# Patient Record
Sex: MALE | Race: WHITE | NOT HISPANIC OR LATINO
[De-identification: names, ages, dates, MRNs, and addresses within clinical notes are randomized per-mention and may not be internally consistent; named-entity substitution may affect disease eponyms.]

---

## 2021-06-03 ENCOUNTER — TRANSCRIPTION ENCOUNTER (OUTPATIENT)
Age: 61
End: 2021-06-03

## 2021-06-03 ENCOUNTER — INPATIENT (INPATIENT)
Facility: HOSPITAL | Age: 61
LOS: 20 days | Discharge: HOME | End: 2021-06-24
Attending: OTOLARYNGOLOGY | Admitting: OTOLARYNGOLOGY
Payer: MEDICARE

## 2021-06-03 VITALS
OXYGEN SATURATION: 100 % | SYSTOLIC BLOOD PRESSURE: 145 MMHG | RESPIRATION RATE: 18 BRPM | TEMPERATURE: 98 F | DIASTOLIC BLOOD PRESSURE: 92 MMHG | HEART RATE: 112 BPM

## 2021-06-03 DIAGNOSIS — C80.1 MALIGNANT (PRIMARY) NEOPLASM, UNSPECIFIED: ICD-10-CM

## 2021-06-03 LAB
ALBUMIN SERPL ELPH-MCNC: 4.1 G/DL — SIGNIFICANT CHANGE UP (ref 3.3–5)
ALP SERPL-CCNC: 66 U/L — SIGNIFICANT CHANGE UP (ref 40–120)
ALT FLD-CCNC: 35 U/L — SIGNIFICANT CHANGE UP (ref 4–41)
APTT BLD: 21 SEC — LOW (ref 27–36.3)
AST SERPL-CCNC: 41 U/L — HIGH (ref 4–40)
BILIRUB DIRECT SERPL-MCNC: <0.2 MG/DL — SIGNIFICANT CHANGE UP (ref 0–0.2)
BILIRUB INDIRECT FLD-MCNC: >2.3 MG/DL — HIGH (ref 0–1)
BILIRUB SERPL-MCNC: 2.5 MG/DL — HIGH (ref 0.2–1.2)
BUN SERPL-MCNC: 14 MG/DL — SIGNIFICANT CHANGE UP (ref 7–23)
CALCIUM SERPL-MCNC: 9.5 MG/DL — SIGNIFICANT CHANGE UP (ref 8.4–10.5)
CO2 SERPL-SCNC: 15 MMOL/L — LOW (ref 22–31)
CREAT SERPL-MCNC: 0.72 MG/DL — SIGNIFICANT CHANGE UP (ref 0.5–1.3)
GLUCOSE SERPL-MCNC: 114 MG/DL — HIGH (ref 70–99)
INR BLD: 0.98 RATIO — SIGNIFICANT CHANGE UP (ref 0.88–1.16)
MAGNESIUM SERPL-MCNC: 1.8 MG/DL — SIGNIFICANT CHANGE UP (ref 1.6–2.6)
PHOSPHATE SERPL-MCNC: 2.7 MG/DL — SIGNIFICANT CHANGE UP (ref 2.5–4.5)
PROT SERPL-MCNC: 6.6 G/DL — SIGNIFICANT CHANGE UP (ref 6–8.3)
PROTHROM AB SERPL-ACNC: 11.2 SEC — SIGNIFICANT CHANGE UP (ref 10.6–13.6)

## 2021-06-03 PROCEDURE — 99233 SBSQ HOSP IP/OBS HIGH 50: CPT | Mod: GC

## 2021-06-03 PROCEDURE — 99223 1ST HOSP IP/OBS HIGH 75: CPT | Mod: GC,25

## 2021-06-03 PROCEDURE — 31575 DIAGNOSTIC LARYNGOSCOPY: CPT | Mod: GC

## 2021-06-03 RX ORDER — SODIUM CHLORIDE 9 MG/ML
1000 INJECTION, SOLUTION INTRAVENOUS
Refills: 0 | Status: DISCONTINUED | OUTPATIENT
Start: 2021-06-03 | End: 2021-06-04

## 2021-06-03 RX ADMIN — SODIUM CHLORIDE 110 MILLILITER(S): 9 INJECTION, SOLUTION INTRAVENOUS at 23:17

## 2021-06-03 NOTE — CONSULT NOTE ADULT - SUBJECTIVE AND OBJECTIVE BOX
SICU Consultation Note  =====================================================  HPI: 60 y.o. male with PMHx HTN, hypothyroidism, PSH of a perforated cecum s/p hemicolectomy and end ileon  (?stomach cancer, s/p colostomy tube 2018) who presents as a transfer from OSH for workup of a laryngeal mass. Per pt has been having SOB on exertion and laying flat, dysphonia for the past 4 weeks and it is progressing. Pt states he has a heavy history of smoking (1 ppd x 46 years) and alcohol abuse (10 16 oz beers x 19 years). Pt denies fevers/weight loss/chills/SOB. States he does not think he has trouble eating/denies dysphagia.    CT scan from OSH reviewed. Showing R laryngeal mass, likely subglottic extending to cricoid.         Surgery Information  OR time:      EBL:       UOP:              IV Fluids:       Blood Products:             PAST MEDICAL & SURGICAL HISTORY:    Home Meds:   Allergies:   Soc:   Advanced Directives: Presumed Full Code     ROS:    General: Non-Contributory  Skin/Breast: Non-Contributory  Ophthalmologic: Non-Contributory  ENMT: Non-Contributory  Respiratory and Thorax: Non-Contributory  Cardiovascular: Non-Contributory  Gastrointestinal: Non-Contributory  Genitourinary: Non-Contributory  Musculoskeletal: Non-Contributory  Neurological: Non-Contributory  Psychiatric: Non-Contributory  Hematology/Lymphatics: Non-Contributory  Endocrine: Non-Contributory  Allergic/Immunologic: Non-Contributory    CURRENT MEDICATIONS:   --------------------------------------------------------------------------------------  Neurologic Medications    Respiratory Medications    Cardiovascular Medications    Gastrointestinal Medications  lactated ringers. 1000 milliLiter(s) IV Continuous <Continuous>    Genitourinary Medications    Hematologic/Oncologic Medications    Antimicrobial/Immunologic Medications    Endocrine/Metabolic Medications    Topical/Other Medications    --------------------------------------------------------------------------------------    VITAL SIGNS, INS/OUTS (last 24 hours):  --------------------------------------------------------------------------------------  ((Insert SICU Vitals / Is+Os here)) ***  --------------------------------------------------------------------------------------    EXAM:  General/Neuro  RASS:   GCS:   Exam: Normal, NAD, alert, oriented x 3, no focal deficits. PERRLA  ***    Respiratory  Exam: Lungs clear to auscultation, Normal expansion/effort.  ***  [] Tracheostomy   [] Intubated  Mechanical Ventilation:     Cardiovascular  Exam: S1, S2.  Regular rate and rhythm.  Peripheral edema  ***  Cardiac Rhythm: Normal Sinus Rhythm  ECHO:     GI  Exam: Abdomen soft, Non-tender, Non-distended.  Gastrostomy / Jejunostomy tube in place.  Nasogastric tube in place.  Colostomy / Ileostomy.  ***  Wound:   ***  Current Diet:  NPO***      Tubes/Lines/Drains  ***  [x] Peripheral IV  [] Central Venous Line     	[] R	[] L	[] IJ	[] Fem	[] SC        Type:	    Date Placed:   [] Arterial Line		[] R	[] L	[] Fem	[] Rad	[] Ax	Date Placed:   [] PICC:         	[] Midline		[] Mediport           [] Urinary Catheter		Date Placed:     Extremities  Exam: Extremities warm, pink, well-perfused.        Derm:  Exam: Good skin turgor, no skin breakdown.      :   Exam: Valdivia catheter in place.     LABS  --------------------------------------------------------------------------------------  ((Insert SICU Labs here))***  --------------------------------------------------------------------------------------    OTHER LABS    IMAGING RESULTS      ASSESSMENT:  60y Male ***    PLAN:   Neurologic:   Respiratory:   Cardiovascular:   Gastrointestinal/Nutrition:   Renal/Genitourinary:   Hematologic:   Infectious Disease:   Lines/Tubes:  Endocrine:   Disposition:     --------------------------------------------------------------------------------------    Critical Care Diagnoses:   SICU Consultation Note  =====================================================  HPI: 60 y.o. male with PMHx HTN, hypothyroidism, PSH perforated cecum s/p hemicolectomy and end ileostomy in 2018 who was transferred from OSH for workup of a laryngeal mass. Pt initially noticed  has been having SOB on exertion and laying flat, dysphonia for the past 4 weeks and it is progressing. Pt states he has a heavy history of smoking (1 ppd x 46 years) and alcohol abuse (10 16 oz beers x 19 years). Pt denies fevers/weight loss/chills/SOB. States he does not think he has trouble eating/denies dysphagia.    CT scan from OSH reviewed. Showing R laryngeal mass, likely subglottic extending to cricoid.         Surgery Information  OR time:      EBL:       UOP:              IV Fluids:       Blood Products:             PAST MEDICAL & SURGICAL HISTORY:    Home Meds:   Allergies:   Soc:   Advanced Directives: Presumed Full Code     ROS:    General: Non-Contributory  Skin/Breast: Non-Contributory  Ophthalmologic: Non-Contributory  ENMT: Non-Contributory  Respiratory and Thorax: Non-Contributory  Cardiovascular: Non-Contributory  Gastrointestinal: Non-Contributory  Genitourinary: Non-Contributory  Musculoskeletal: Non-Contributory  Neurological: Non-Contributory  Psychiatric: Non-Contributory  Hematology/Lymphatics: Non-Contributory  Endocrine: Non-Contributory  Allergic/Immunologic: Non-Contributory    CURRENT MEDICATIONS:   --------------------------------------------------------------------------------------  Neurologic Medications    Respiratory Medications    Cardiovascular Medications    Gastrointestinal Medications  lactated ringers. 1000 milliLiter(s) IV Continuous <Continuous>    Genitourinary Medications    Hematologic/Oncologic Medications    Antimicrobial/Immunologic Medications    Endocrine/Metabolic Medications    Topical/Other Medications    --------------------------------------------------------------------------------------    VITAL SIGNS, INS/OUTS (last 24 hours):  --------------------------------------------------------------------------------------  ((Insert SICU Vitals / Is+Os here)) ***  --------------------------------------------------------------------------------------    EXAM:  General/Neuro  RASS:   GCS:   Exam: Normal, NAD, alert, oriented x 3, no focal deficits. PERRLA  ***    Respiratory  Exam: Lungs clear to auscultation, Normal expansion/effort.  ***  [] Tracheostomy   [] Intubated  Mechanical Ventilation:     Cardiovascular  Exam: S1, S2.  Regular rate and rhythm.  Peripheral edema  ***  Cardiac Rhythm: Normal Sinus Rhythm  ECHO:     GI  Exam: Abdomen soft, Non-tender, Non-distended.  Gastrostomy / Jejunostomy tube in place.  Nasogastric tube in place.  Colostomy / Ileostomy.  ***  Wound:   ***  Current Diet:  NPO***      Tubes/Lines/Drains  ***  [x] Peripheral IV  [] Central Venous Line     	[] R	[] L	[] IJ	[] Fem	[] SC        Type:	    Date Placed:   [] Arterial Line		[] R	[] L	[] Fem	[] Rad	[] Ax	Date Placed:   [] PICC:         	[] Midline		[] Mediport           [] Urinary Catheter		Date Placed:     Extremities  Exam: Extremities warm, pink, well-perfused.        Derm:  Exam: Good skin turgor, no skin breakdown.      :   Exam: Valdivia catheter in place.     LABS  --------------------------------------------------------------------------------------  ((Insert SICU Labs here))***  --------------------------------------------------------------------------------------    OTHER LABS    IMAGING RESULTS      ASSESSMENT:  60y Male ***    PLAN:   Neurologic:   Respiratory:   Cardiovascular:   Gastrointestinal/Nutrition:   Renal/Genitourinary:   Hematologic:   Infectious Disease:   Lines/Tubes:  Endocrine:   Disposition:     --------------------------------------------------------------------------------------    Critical Care Diagnoses:   SICU Consultation Note  =====================================================  HPI: 60 y.o. 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 Pt initially noticed hoarse voice this past January. Pt presented to the OSH with SOB, difficulty breathing. From pt's records from OSH, CT neck showed bulky transglottic right sided mass, CT chest showed no intrathoracic mass. Pt was given solumedrol and duonebs and pt was transferred for further management. SICU consulted for [place].    CURRENT MEDICATIONS:   --------------------------------------------------------------------------------------  Neurologic Medications    Respiratory Medications    Cardiovascular Medications    Gastrointestinal Medications  lactated ringers. 1000 milliLiter(s) IV Continuous <Continuous>    Genitourinary Medications    Hematologic/Oncologic Medications    Antimicrobial/Immunologic Medications    Endocrine/Metabolic Medications    Topical/Other Medications    --------------------------------------------------------------------------------------  ICU Vital Signs Last 24 Hrs  T(C): 36.7 (04 Jun 2021 00:17), Max: 36.8 (03 Jun 2021 18:18)  T(F): 98 (04 Jun 2021 00:17), Max: 98.2 (03 Jun 2021 18:18)  HR: 91 (04 Jun 2021 00:17) (91 - 112)  BP: 140/89 (04 Jun 2021 00:17) (124/87 - 145/92)  BP(mean): --  ABP: --  ABP(mean): --  RR: 17 (04 Jun 2021 00:17) (17 - 18)  SpO2: 98% (04 Jun 2021 00:17) (98% - 100%)    --------------------------------------------------------------------------------------  I&O's Detail    03 Jun 2021 07:01  -  04 Jun 2021 00:35  --------------------------------------------------------  IN:  Total IN: 0 mL    OUT:    Voided (mL): 400 mL  Total OUT: 400 mL    Total NET: -400 mL    --------------------------------------------------------------------------------------    EXAM:  General/Neuro    Exam: NAD, laying comfortably in bed, no tremor, alert, oriented x 3, no focal deficits,     Respiratory  Exam: Lungs clear to auscultation, Normal expansion/effort, patent airway, noticeable horse voice, Sats 97% on RA    Cardiovascular  Exam: S1, S2.  Regular rate and rhythm, not tachycardic. Cardiac Rhythm: Normal Sinus Rhythm  Vitals: 151/93, HR 97    GI  Exam: Abdomen soft, Non-tender, Non-distended. Current Diet:  NPO    Tubes/Lines/Drains    [x] Peripheral IV  [] Central Venous Line     	[] R	[] L	[] IJ	[] Fem	[] SC        Type:	    Date Placed:   [] Arterial Line		[] R	[] L	[] Fem	[] Rad	[] Ax	Date Placed:   [] PICC:         	[] Midline		[] Mediport           [] Urinary Catheter		Date Placed:     Extremities  Exam: Extremities warm, pink, well-perfused.      Derm:  Exam: Good skin turgor, no skin breakdown.      :   Exam: Valdivia catheter in place.     LABS  --------------------------------------------------------------------------------------    BMP (06-03 @ 23:17)             116<LL>  |  85<L>   |  14    		Ca++ --      Ca 9.5                ---------------------------------( 114<H>		Mg 1.8                5.3     |  15<L>   |  0.72  			Ph 2.7       LFTs (06-03 @ 23:17)      TPro 6.6 / Alb 4.1 / TBili 2.5<H> / DBili <0.2 / AST 41<H> / ALT 35 / AlkPhos 66    Coags (06-03 @ 23:17)  aPTT 21.0<L> / INR 0.98 / PT 11.2      --------------------------------------------------------------------------------------    ASSESSMENT:  60 y.o. male with right sided transglottic laryngeal mass    PLAN:   Neurologic: A&Ox4, in no acute distress laying comfortably, no tremors  Respiratory: Patent airway, Sats > 97% on RA, not using accessory muscles   Cardiovascular: hemodynamically stable, not tachycardic, MAP > 65  Gastrointestinal/Nutrition: NPO, plan as add on per ENT  Renal/Genitourinary: strict Is and Os  Hematologic: trend CBCs  Infectious Disease: hx of hypothyroidism  Lines/Tubes: PIV  Endocrine:   Disposition:     --------------------------------------------------------------------------------------    Critical Care Diagnoses:   SICU Consultation Note  =====================================================  HPI: 60 y.o. 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 Pt initially noticed hoarse voice this past January. Pt presented to the OSH with SOB, difficulty breathing. From pt's records from OSH, CT neck showed bulky transglottic right sided mass, CT chest showed no intrathoracic mass. Pt was given solumedrol and duonebs and pt was transferred for further management. SICU consulted for [employer].    CURRENT MEDICATIONS:   --------------------------------------------------------------------------------------  Neurologic Medications    Respiratory Medications    Cardiovascular Medications    Gastrointestinal Medications  lactated ringers. 1000 milliLiter(s) IV Continuous <Continuous>    Genitourinary Medications    Hematologic/Oncologic Medications    Antimicrobial/Immunologic Medications    Endocrine/Metabolic Medications    Topical/Other Medications    --------------------------------------------------------------------------------------  ICU Vital Signs Last 24 Hrs  T(C): 36.7 (04 Jun 2021 00:17), Max: 36.8 (03 Jun 2021 18:18)  T(F): 98 (04 Jun 2021 00:17), Max: 98.2 (03 Jun 2021 18:18)  HR: 91 (04 Jun 2021 00:17) (91 - 112)  BP: 140/89 (04 Jun 2021 00:17) (124/87 - 145/92)  BP(mean): --  ABP: --  ABP(mean): --  RR: 17 (04 Jun 2021 00:17) (17 - 18)  SpO2: 98% (04 Jun 2021 00:17) (98% - 100%)    --------------------------------------------------------------------------------------  I&O's Detail    03 Jun 2021 07:01  -  04 Jun 2021 00:35  --------------------------------------------------------  IN:  Total IN: 0 mL    OUT:    Voided (mL): 400 mL  Total OUT: 400 mL    Total NET: -400 mL    --------------------------------------------------------------------------------------    EXAM:  General/Neuro    Exam: NAD, laying comfortably in bed, no tremor, alert, oriented x 3, no focal deficits,     Respiratory  Exam: Lungs clear to auscultation, Normal expansion/effort, patent airway, noticeable horse voice, Sats 97% on RA    Cardiovascular  Exam: S1, S2.  Regular rate and rhythm, not tachycardic. Cardiac Rhythm: Normal Sinus Rhythm  Vitals: 151/93, HR 97    GI  Exam: Abdomen soft, Non-tender, Non-distended. Current Diet:  NPO    Tubes/Lines/Drains    [x] Peripheral IV  [] Central Venous Line     	[] R	[] L	[] IJ	[] Fem	[] SC        Type:	    Date Placed:   [] Arterial Line		[] R	[] L	[] Fem	[] Rad	[] Ax	Date Placed:   [] PICC:         	[] Midline		[] Mediport           [] Urinary Catheter		Date Placed:     Extremities  Exam: Extremities warm, pink, well-perfused.      Derm:  Exam: Good skin turgor, no skin breakdown.      :   Exam: Valdivia catheter in place.     LABS  --------------------------------------------------------------------------------------    BMP (06-03 @ 23:17)             116<LL>  |  85<L>   |  14    		Ca++ --      Ca 9.5                ---------------------------------( 114<H>		Mg 1.8                5.3     |  15<L>   |  0.72  			Ph 2.7       LFTs (06-03 @ 23:17)      TPro 6.6 / Alb 4.1 / TBili 2.5<H> / DBili <0.2 / AST 41<H> / ALT 35 / AlkPhos 66    Coags (06-03 @ 23:17)  aPTT 21.0<L> / INR 0.98 / PT 11.2      --------------------------------------------------------------------------------------    ASSESSMENT:  60 y.o. male with right sided transglottic laryngeal mass    PLAN:   Neurologic: A&Ox4, in no acute distress laying comfortably, no tremors  Respiratory: Patent airway, Sats > 97% on RA, not using accessory muscles   Cardiovascular: hemodynamically stable, not tachycardic, MAP > 65  Gastrointestinal/Nutrition: NPO, plan as add on per ENT  Renal/Genitourinary: strict Is and Os, AM labs pending  Hematologic: trend CBCs  Infectious Disease: JAMMIE  Lines/Tubes: PIV  Endocrine: hx of hypothyroidism  Disposition: Floor    Appreciate the consult but, pt does not meet SICU requirements at this time. Pt recall SICU if needed  --------------------------------------------------------------------------------------    Critical Care Diagnoses: Laryngeal cancer

## 2021-06-03 NOTE — PATIENT PROFILE ADULT - NSPROMEDSADMININFO_GEN_A_NUR
Spoke with patient. Informed her that Dr. Milligan reviewed her glucose values and states she can change to BID testing. Discussed doing fasting each morning and alternating 2 hr after meal values. She v/u and agrees.   
no concerns

## 2021-06-03 NOTE — CONSULT NOTE ADULT - SUBJECTIVE AND OBJECTIVE BOX
HPI:  Patient is a 60y Male with unknown PMHx (?stomach cancer, s/p colostomy tube 2018) who presents as a transfer from OSH for workup of a laryngeal mass. Per pt has been having SOB on exertion and laying flat, dysphonia for the past 4 weeks and it is progressing. Pt states he has a heavy history of smoking (1 ppd x 46 years) and alcohol abuse (10 16 oz beers x 19 years). Pt denies fevers/weight loss/chills/SOB. States he does not think he has trouble eating/denies dysphagia.    CT scan from OSH reviewed. Showing R laryngeal mass, likely subglottic extending to cricoid.     Physical Exam  T(C): 36.8 (06-03-21 @ 20:19), Max: 36.8 (06-03-21 @ 18:18)  HR: 95 (06-03-21 @ 20:19) (95 - 112)  BP: 124/87 (06-03-21 @ 20:19) (124/87 - 145/92)  RR: 18 (06-03-21 @ 20:19) (18 - 18)  SpO2: 99% (06-03-21 @ 20:19) (99% - 100%)  General: NAD, A+Ox3 on my exam   Voice quality: raspy and hoarse   Face:  Symmetric without masses or lesions  OU: EOMI   Nose: nasal cavity clear bilaterally, inferior turbinates normal, mucosa normal without crusting or bleeding  OC/OP: tongue normal, floor of mouth wnl, no masses or lesions, OP clear  Neck: soft/flat, no LAD palpated.   CNII-XII intact    Procedure: Flexible laryngoscopy    Pre-procedure diagnosis/Indication for procedure: To evaluate larynx    Anesthesia: None    Description:    A flexible endoscope was used to examine the left and right nasal cavities, posterior oropharynx, hypopharynx, and larynx. The nasal valve areas were examined for abnormalities or collapse. The inferior and middle turbinates were evaluated. The middle and superior meatuses, the sphenoethmoid recesses, and the nasopharynx were examined and inspected for mucopurulence, polyps, and nasal masses. The oropharynx, tongue base/vallecula, epiglottis, aryepiglottic folds, arytenoids, vocal cords, hypopharynx were also inspected for swelling, inflammation, or dysfunction. The patient tolerated the procedure without complications.    Findings:    NP wnl  BOT/vallecula normal  Epiglottis sharp  AE folds nonedematous  Arytenoids mobile  R TVC immobile   Unable to fully visualize mass on exam   No masses or lesions visualized in post cricoid space or pyriform sinuses bilaterally      --------------------------------------------------------------      A/P:  60M unknown PMHx p/w laryngeal mass. Based on CT scan and scope exam, will likely need OR intervention for further workup.  - please keep NPO  - preop labs  - COVID swab  - medicine consult for preop assessment  - [facility] protocol - SICU  - SLP eval   - needs consent for DLB/biopsy tomorrow    ---------------------------------------------------------------

## 2021-06-03 NOTE — PROVIDER CONTACT NOTE (OTHER) - SITUATION
patient have remote tele order and also requires CIWA. [facility] RNs not trained to do tele or CIWA. patient is currently not being monitored untill transferred to ICU.

## 2021-06-03 NOTE — CONSULT NOTE ADULT - ATTENDING COMMENTS
Mr. 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 His euvolemic hyponatremia is possibly related to his underlying malignancy or hypothyroidism. via elevated ADH. Pre-op for OR in AM  SICU admission pending availability of bed/staff    E87.1 Hyponatremia   -NS at 125cc/hr and rechck BMP, monitor for mental status change. Avoid quick correction since duration of hyponatremia unclear  -LFTs normal and glucose WNL  J98.8 Airway obstruction   -trach in AM with ENT, airway watch for now  F10.10 ETOH abuse   -monitor for withdrawal, no evidence of liver disease currently    Moody Stallworth MD  Acute Care Surgery

## 2021-06-04 ENCOUNTER — RESULT REVIEW (OUTPATIENT)
Age: 61
End: 2021-06-04

## 2021-06-04 LAB
ANION GAP SERPL CALC-SCNC: 11 MMOL/L — SIGNIFICANT CHANGE UP (ref 7–14)
ANION GAP SERPL CALC-SCNC: 11 MMOL/L — SIGNIFICANT CHANGE UP (ref 7–14)
ANION GAP SERPL CALC-SCNC: 13 MMOL/L — SIGNIFICANT CHANGE UP (ref 7–14)
ANION GAP SERPL CALC-SCNC: 16 MMOL/L — HIGH (ref 7–14)
APTT BLD: 26.5 SEC — LOW (ref 27–36.3)
BASOPHILS # BLD AUTO: 0 K/UL — SIGNIFICANT CHANGE UP (ref 0–0.2)
BASOPHILS NFR BLD AUTO: 0 % — SIGNIFICANT CHANGE UP (ref 0–2)
BLD GP AB SCN SERPL QL: NEGATIVE — SIGNIFICANT CHANGE UP
BUN SERPL-MCNC: 10 MG/DL — SIGNIFICANT CHANGE UP (ref 7–23)
CALCIUM SERPL-MCNC: 7 MG/DL — LOW (ref 8.4–10.5)
CALCIUM SERPL-MCNC: 8.3 MG/DL — LOW (ref 8.4–10.5)
CALCIUM SERPL-MCNC: 8.7 MG/DL — SIGNIFICANT CHANGE UP (ref 8.4–10.5)
CHLORIDE SERPL-SCNC: 85 MMOL/L — LOW (ref 98–107)
CHLORIDE SERPL-SCNC: 90 MMOL/L — LOW (ref 98–107)
CHLORIDE SERPL-SCNC: 91 MMOL/L — LOW (ref 98–107)
CHLORIDE SERPL-SCNC: 98 MMOL/L — SIGNIFICANT CHANGE UP (ref 98–107)
CO2 SERPL-SCNC: 18 MMOL/L — LOW (ref 22–31)
CO2 SERPL-SCNC: 21 MMOL/L — LOW (ref 22–31)
CO2 SERPL-SCNC: 22 MMOL/L — SIGNIFICANT CHANGE UP (ref 22–31)
CREAT SERPL-MCNC: 0.53 MG/DL — SIGNIFICANT CHANGE UP (ref 0.5–1.3)
CREAT SERPL-MCNC: 0.71 MG/DL — SIGNIFICANT CHANGE UP (ref 0.5–1.3)
CREAT SERPL-MCNC: 0.72 MG/DL — SIGNIFICANT CHANGE UP (ref 0.5–1.3)
EOSINOPHIL # BLD AUTO: 0 K/UL — SIGNIFICANT CHANGE UP (ref 0–0.5)
EOSINOPHIL NFR BLD AUTO: 0 % — SIGNIFICANT CHANGE UP (ref 0–6)
GIANT PLATELETS BLD QL SMEAR: PRESENT — SIGNIFICANT CHANGE UP
GLUCOSE SERPL-MCNC: 101 MG/DL — HIGH (ref 70–99)
GLUCOSE SERPL-MCNC: 129 MG/DL — HIGH (ref 70–99)
GLUCOSE SERPL-MCNC: 98 MG/DL — SIGNIFICANT CHANGE UP (ref 70–99)
HCT VFR BLD CALC: 32.6 % — LOW (ref 39–50)
HCT VFR BLD CALC: 42.3 % — SIGNIFICANT CHANGE UP (ref 39–50)
HGB BLD-MCNC: 11.9 G/DL — LOW (ref 13–17)
HGB BLD-MCNC: 14.2 G/DL — SIGNIFICANT CHANGE UP (ref 13–17)
IANC: 8.83 K/UL — HIGH (ref 1.5–8.5)
INR BLD: 0.98 RATIO — SIGNIFICANT CHANGE UP (ref 0.88–1.16)
LYMPHOCYTES # BLD AUTO: 0.34 K/UL — LOW (ref 1–3.3)
LYMPHOCYTES # BLD AUTO: 3.5 % — LOW (ref 13–44)
MAGNESIUM SERPL-MCNC: 1.3 MG/DL — LOW (ref 1.6–2.6)
MAGNESIUM SERPL-MCNC: 1.5 MG/DL — LOW (ref 1.6–2.6)
MAGNESIUM SERPL-MCNC: 1.8 MG/DL — SIGNIFICANT CHANGE UP (ref 1.6–2.6)
MAGNESIUM SERPL-MCNC: 1.8 MG/DL — SIGNIFICANT CHANGE UP (ref 1.6–2.6)
MANUAL SMEAR VERIFICATION: SIGNIFICANT CHANGE UP
MCHC RBC-ENTMCNC: 31.1 PG — SIGNIFICANT CHANGE UP (ref 27–34)
MCHC RBC-ENTMCNC: 33.6 GM/DL — SIGNIFICANT CHANGE UP (ref 32–36)
MCHC RBC-ENTMCNC: 34.5 PG — HIGH (ref 27–34)
MCHC RBC-ENTMCNC: 36.5 GM/DL — HIGH (ref 32–36)
MCV RBC AUTO: 92.6 FL — SIGNIFICANT CHANGE UP (ref 80–100)
MCV RBC AUTO: 94.5 FL — SIGNIFICANT CHANGE UP (ref 80–100)
MONOCYTES # BLD AUTO: 0.27 K/UL — SIGNIFICANT CHANGE UP (ref 0–0.9)
MONOCYTES NFR BLD AUTO: 2.7 % — SIGNIFICANT CHANGE UP (ref 2–14)
NEUTROPHILS # BLD AUTO: 9.22 K/UL — HIGH (ref 1.8–7.4)
NEUTROPHILS NFR BLD AUTO: 92.9 % — HIGH (ref 43–77)
NEUTS BAND # BLD: 0.9 % — SIGNIFICANT CHANGE UP (ref 0–6)
NRBC # BLD: 0 /100 WBCS — SIGNIFICANT CHANGE UP
NRBC # FLD: 0 K/UL — SIGNIFICANT CHANGE UP
PHOSPHATE SERPL-MCNC: 13.4 MG/DL — HIGH (ref 2.5–4.5)
PHOSPHATE SERPL-MCNC: 2.1 MG/DL — LOW (ref 2.5–4.5)
PHOSPHATE SERPL-MCNC: 3.8 MG/DL — SIGNIFICANT CHANGE UP (ref 2.5–4.5)
PLAT MORPH BLD: NORMAL — SIGNIFICANT CHANGE UP
PLATELET # BLD AUTO: 101 K/UL — LOW (ref 150–400)
PLATELET # BLD AUTO: 134 K/UL — LOW (ref 150–400)
PLATELET COUNT - ESTIMATE: ABNORMAL
POIKILOCYTOSIS BLD QL AUTO: SLIGHT — SIGNIFICANT CHANGE UP
POTASSIUM SERPL-MCNC: 3.4 MMOL/L — LOW (ref 3.5–5.3)
POTASSIUM SERPL-MCNC: 4.6 MMOL/L — SIGNIFICANT CHANGE UP (ref 3.5–5.3)
POTASSIUM SERPL-MCNC: 5.3 MMOL/L — SIGNIFICANT CHANGE UP (ref 3.5–5.3)
POTASSIUM SERPL-MCNC: 9.6 MMOL/L — CRITICAL HIGH (ref 3.5–5.3)
POTASSIUM SERPL-SCNC: 3.4 MMOL/L — LOW (ref 3.5–5.3)
POTASSIUM SERPL-SCNC: 4.6 MMOL/L — SIGNIFICANT CHANGE UP (ref 3.5–5.3)
POTASSIUM SERPL-SCNC: 5.3 MMOL/L — SIGNIFICANT CHANGE UP (ref 3.5–5.3)
POTASSIUM SERPL-SCNC: 9.6 MMOL/L — CRITICAL HIGH (ref 3.5–5.3)
PROTHROM AB SERPL-ACNC: 11.3 SEC — SIGNIFICANT CHANGE UP (ref 10.6–13.6)
RBC # BLD: 3.45 M/UL — LOW (ref 4.2–5.8)
RBC # BLD: 4.57 M/UL — SIGNIFICANT CHANGE UP (ref 4.2–5.8)
RBC # FLD: 11.7 % — SIGNIFICANT CHANGE UP (ref 10.3–14.5)
RBC # FLD: 11.9 % — SIGNIFICANT CHANGE UP (ref 10.3–14.5)
RBC BLD AUTO: ABNORMAL
RH IG SCN BLD-IMP: POSITIVE — SIGNIFICANT CHANGE UP
SARS-COV-2 RNA SPEC QL NAA+PROBE: SIGNIFICANT CHANGE UP
SODIUM SERPL-SCNC: 116 MMOL/L — CRITICAL LOW (ref 135–145)
SODIUM SERPL-SCNC: 124 MMOL/L — LOW (ref 135–145)
SODIUM SERPL-SCNC: 124 MMOL/L — LOW (ref 135–145)
SODIUM SERPL-SCNC: 127 MMOL/L — LOW (ref 135–145)
WBC # BLD: 5.79 K/UL — SIGNIFICANT CHANGE UP (ref 3.8–10.5)
WBC # BLD: 9.83 K/UL — SIGNIFICANT CHANGE UP (ref 3.8–10.5)
WBC # FLD AUTO: 5.79 K/UL — SIGNIFICANT CHANGE UP (ref 3.8–10.5)
WBC # FLD AUTO: 9.83 K/UL — SIGNIFICANT CHANGE UP (ref 3.8–10.5)

## 2021-06-04 PROCEDURE — 31535 LARYNGOSCOPY W/BIOPSY: CPT | Mod: GC

## 2021-06-04 PROCEDURE — 99291 CRITICAL CARE FIRST HOUR: CPT | Mod: 25

## 2021-06-04 PROCEDURE — 88331 PATH CONSLTJ SURG 1 BLK 1SPC: CPT | Mod: 26

## 2021-06-04 PROCEDURE — 31600 PLANNED TRACHEOSTOMY: CPT | Mod: GC

## 2021-06-04 PROCEDURE — 88305 TISSUE EXAM BY PATHOLOGIST: CPT | Mod: 26

## 2021-06-04 PROCEDURE — 43191 ESOPHAGOSCOPY RIGID TRNSO DX: CPT | Mod: GC

## 2021-06-04 RX ORDER — ACETAMINOPHEN 500 MG
650 TABLET ORAL ONCE
Refills: 0 | Status: DISCONTINUED | OUTPATIENT
Start: 2021-06-04 | End: 2021-06-04

## 2021-06-04 RX ORDER — ACETAMINOPHEN 500 MG
1000 TABLET ORAL EVERY 6 HOURS
Refills: 0 | Status: COMPLETED | OUTPATIENT
Start: 2021-06-04 | End: 2021-06-05

## 2021-06-04 RX ORDER — MAGNESIUM SULFATE 500 MG/ML
2 VIAL (ML) INJECTION
Refills: 0 | Status: COMPLETED | OUTPATIENT
Start: 2021-06-04 | End: 2021-06-04

## 2021-06-04 RX ORDER — ACETAMINOPHEN 500 MG
650 TABLET ORAL EVERY 6 HOURS
Refills: 0 | Status: DISCONTINUED | OUTPATIENT
Start: 2021-06-04 | End: 2021-06-04

## 2021-06-04 RX ORDER — POTASSIUM PHOSPHATE, MONOBASIC POTASSIUM PHOSPHATE, DIBASIC 236; 224 MG/ML; MG/ML
15 INJECTION, SOLUTION INTRAVENOUS ONCE
Refills: 0 | Status: COMPLETED | OUTPATIENT
Start: 2021-06-04 | End: 2021-06-04

## 2021-06-04 RX ORDER — ENOXAPARIN SODIUM 100 MG/ML
40 INJECTION SUBCUTANEOUS DAILY
Refills: 0 | Status: DISCONTINUED | OUTPATIENT
Start: 2021-06-04 | End: 2021-06-11

## 2021-06-04 RX ORDER — THIAMINE MONONITRATE (VIT B1) 100 MG
100 TABLET ORAL ONCE
Refills: 0 | Status: COMPLETED | OUTPATIENT
Start: 2021-06-04 | End: 2021-06-04

## 2021-06-04 RX ORDER — SODIUM CHLORIDE 9 MG/ML
1000 INJECTION INTRAMUSCULAR; INTRAVENOUS; SUBCUTANEOUS
Refills: 0 | Status: DISCONTINUED | OUTPATIENT
Start: 2021-06-04 | End: 2021-06-07

## 2021-06-04 RX ORDER — PANTOPRAZOLE SODIUM 20 MG/1
40 TABLET, DELAYED RELEASE ORAL DAILY
Refills: 0 | Status: DISCONTINUED | OUTPATIENT
Start: 2021-06-04 | End: 2021-06-05

## 2021-06-04 RX ADMIN — PANTOPRAZOLE SODIUM 40 MILLIGRAM(S): 20 TABLET, DELAYED RELEASE ORAL at 15:32

## 2021-06-04 RX ADMIN — Medication 100 MILLIGRAM(S): at 15:00

## 2021-06-04 RX ADMIN — Medication 400 MILLIGRAM(S): at 18:38

## 2021-06-04 RX ADMIN — SODIUM CHLORIDE 75 MILLILITER(S): 9 INJECTION INTRAMUSCULAR; INTRAVENOUS; SUBCUTANEOUS at 02:08

## 2021-06-04 RX ADMIN — Medication 50 GRAM(S): at 08:29

## 2021-06-04 RX ADMIN — ENOXAPARIN SODIUM 40 MILLIGRAM(S): 100 INJECTION SUBCUTANEOUS at 18:38

## 2021-06-04 RX ADMIN — Medication 50 GRAM(S): at 08:30

## 2021-06-04 RX ADMIN — POTASSIUM PHOSPHATE, MONOBASIC POTASSIUM PHOSPHATE, DIBASIC 62.5 MILLIMOLE(S): 236; 224 INJECTION, SOLUTION INTRAVENOUS at 08:30

## 2021-06-04 NOTE — CHART NOTE - NSCHARTNOTEFT_GEN_A_CORE
Spoke w MD Thornton [phone] and patient RN this shift.  RN expressed concern that patient was trached 2 hrs before consult was placed and he is adjusting to not being able to speak at this time.  Spoke with MD - no urgent need for consult at this time. Can be seen Monday.  Requesting help with substance addiction and resources upon discharge.   NO concern for suicidality, psychosis, or acute withdrawal at this time.  Patient is ordered for PRN ativan and has not required at this time. SICU does not do [place] protocol as per staff on the unit.  Educated on s.s of withdrawal to be mindful of, HTN, Tachycardia, tremors, diaphoresis.   Aware to call CL if consult is needed prior to Monday 6/7.

## 2021-06-04 NOTE — PROGRESS NOTE ADULT - ASSESSMENT
A/P:  60 p/w laryngeal mass. Based on CT scan and scope exam, will likely need OR intervention for further workup.  - please keep NPO for DL/biopsy/possible trach today  - F/u CTS on possible PEG during procedure  - preop labs  - COVID swab  - medicine consult for preop assessment  - [facility] protocol - SICU  - SLP brandon  A/P:  60 p/w laryngeal mass. Based on CT scan and scope exam, will likely need OR intervention for further workup.  - please keep NPO for DL/biopsy/possible trach today  - F/u CTS on possible PEG during procedure  - preop labs  - COVID swab  - Needs preop assessment for medical clearance  - [facility] protocol - SICU  - SLP eval after procedure for diet

## 2021-06-04 NOTE — PROVIDER CONTACT NOTE (CRITICAL VALUE NOTIFICATION) - RECOMMENDATIONS
consider confirmation of result - Pt physically not on unit yet - pending transfer from [facility]S who told toxicology that patient is no longer there

## 2021-06-04 NOTE — PROGRESS NOTE ADULT - SUBJECTIVE AND OBJECTIVE BOX
SICU Consultation Note  =====================================================  HPI: 60 y.o. 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 Pt initially noticed hoarse voice this past January. Pt presented to the OSH with SOB, difficulty breathing. From pt's records from OSH, CT neck showed bulky transglottic right sided mass, CT chest showed no intrathoracic mass. Pt was given solumedrol and duonebs and pt was transferred for further management. SICU consulted for [employer].    Overnight events:   - SICU recalled for Na 116  - pt accepted to SICU for hyponatremia     CURRENT MEDICATIONS:   --------------------------------------------------------------------------------------  Neurologic Medications    Respiratory Medications    Cardiovascular Medications    Gastrointestinal Medications  lactated ringers. 1000 milliLiter(s) IV Continuous <Continuous>    Genitourinary Medications    Hematologic/Oncologic Medications    Antimicrobial/Immunologic Medications    Endocrine/Metabolic Medications    Topical/Other Medications    --------------------------------------------------------------------------------------  ICU Vital Signs Last 24 Hrs  T(C): 36.7 (04 Jun 2021 00:17), Max: 36.8 (03 Jun 2021 18:18)  T(F): 98 (04 Jun 2021 00:17), Max: 98.2 (03 Jun 2021 18:18)  HR: 91 (04 Jun 2021 00:17) (91 - 112)  BP: 140/89 (04 Jun 2021 00:17) (124/87 - 145/92)  BP(mean): --  ABP: --  ABP(mean): --  RR: 17 (04 Jun 2021 00:17) (17 - 18)  SpO2: 98% (04 Jun 2021 00:17) (98% - 100%)    --------------------------------------------------------------------------------------  I&O's Detail    03 Jun 2021 07:01  -  04 Jun 2021 00:35  --------------------------------------------------------  IN:  Total IN: 0 mL    OUT:    Voided (mL): 400 mL  Total OUT: 400 mL    Total NET: -400 mL    --------------------------------------------------------------------------------------    EXAM:  General/Neuro    Exam: NAD, laying comfortably in bed, no tremor, alert, oriented x 3, no focal deficits,     Respiratory  Exam: Lungs clear to auscultation, Normal expansion/effort, patent airway, noticeable horse voice, Sats 97% on RA    Cardiovascular  Exam: S1, S2.  Regular rate and rhythm, not tachycardic. Cardiac Rhythm: Normal Sinus Rhythm  Vitals: 151/93, HR 97    GI  Exam: Abdomen soft, Non-tender, Non-distended. Current Diet:  NPO    Tubes/Lines/Drains    [x] Peripheral IV  [] Central Venous Line     	[] R	[] L	[] IJ	[] Fem	[] SC        Type:	    Date Placed:   [] Arterial Line		[] R	[] L	[] Fem	[] Rad	[] Ax	Date Placed:   [] PICC:         	[] Midline		[] Mediport           [] Urinary Catheter		Date Placed:     Extremities  Exam: Extremities warm, pink, well-perfused.      Derm:  Exam: Good skin turgor, no skin breakdown.      :   Exam: Valdivia catheter in place.     LABS  --------------------------------------------------------------------------------------    BMP (06-03 @ 23:17)             116<LL>  |  85<L>   |  14    		Ca++ --      Ca 9.5                ---------------------------------( 114<H>		Mg 1.8                5.3     |  15<L>   |  0.72  			Ph 2.7       LFTs (06-03 @ 23:17)      TPro 6.6 / Alb 4.1 / TBili 2.5<H> / DBili <0.2 / AST 41<H> / ALT 35 / AlkPhos 66    Coags (06-03 @ 23:17)  aPTT 21.0<L> / INR 0.98 / PT 11.2      --------------------------------------------------------------------------------------    ASSESSMENT:  60 y.o. male with right sided transglottic laryngeal mass    PLAN:   Neurologic: A&Ox4, in no acute distress laying comfortably, no tremors  Respiratory: Patent airway, Sats > 97% on RA, not using accessory muscles   Cardiovascular: hemodynamically stable, not tachycardic, MAP > 65  Gastrointestinal/Nutrition: NPO, plan as add on per ENT  Renal/Genitourinary: strict Is and Os, NA 100mL/hr, Q6 BMP  Hematologic: trend CBCs  Infectious Disease: JAMMIE  Lines/Tubes: PIV  Endocrine: hx of hypothyroidism  Disposition: SICU    --------------------------------------------------------------------------------------    Critical Care Diagnoses: Laryngeal cancer   SICU Consultation Note  =====================================================  HPI: 60 y.o. 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 Pt initially noticed hoarse voice this past January. Pt presented to the OSH with SOB, difficulty breathing. From pt's records from OSH, CT neck showed bulky transglottic right sided mass, CT chest showed no intrathoracic mass. Pt was given solumedrol and duonebs and pt was transferred for further management. SICU consulted for [employer].    Overnight events:   - SICU recalled for Na 116  - pt accepted to SICU for hyponatremia     CURRENT MEDICATIONS:   --------------------------------------------------------------------------------------  Neurologic Medications    Respiratory Medications    Cardiovascular Medications    Gastrointestinal Medications  lactated ringers. 1000 milliLiter(s) IV Continuous <Continuous>    Genitourinary Medications    Hematologic/Oncologic Medications    Antimicrobial/Immunologic Medications    Endocrine/Metabolic Medications    Topical/Other Medications    --------------------------------------------------------------------------------------  ICU Vital Signs Last 24 Hrs  T(C): 36.7 (04 Jun 2021 00:17), Max: 36.8 (03 Jun 2021 18:18)  T(F): 98 (04 Jun 2021 00:17), Max: 98.2 (03 Jun 2021 18:18)  HR: 91 (04 Jun 2021 00:17) (91 - 112)  BP: 140/89 (04 Jun 2021 00:17) (124/87 - 145/92)  BP(mean): --  ABP: --  ABP(mean): --  RR: 17 (04 Jun 2021 00:17) (17 - 18)  SpO2: 98% (04 Jun 2021 00:17) (98% - 100%)    --------------------------------------------------------------------------------------  I&O's Detail    03 Jun 2021 07:01  -  04 Jun 2021 00:35  --------------------------------------------------------  IN:  Total IN: 0 mL    OUT:    Voided (mL): 400 mL  Total OUT: 400 mL    Total NET: -400 mL    --------------------------------------------------------------------------------------    EXAM:  General/Neuro    Exam: NAD, laying comfortably in bed, no tremor, alert, oriented x 3, no focal deficits,     Respiratory  Exam: Lungs clear to auscultation, Normal expansion/effort, patent airway, noticeable horse voice, Sats 97% on RA    Cardiovascular  Exam: S1, S2.  Regular rate and rhythm, not tachycardic. Cardiac Rhythm: Normal Sinus Rhythm  Vitals: 151/93, HR 97    GI  Exam: Abdomen soft, Non-tender, Non-distended. Current Diet:  NPO    Tubes/Lines/Drains    [x] Peripheral IV  [] Central Venous Line     	[] R	[] L	[] IJ	[] Fem	[] SC        Type:	    Date Placed:   [] Arterial Line		[] R	[] L	[] Fem	[] Rad	[] Ax	Date Placed:   [] PICC:         	[] Midline		[] Mediport           [] Urinary Catheter		Date Placed:     Extremities  Exam: Extremities warm, pink, well-perfused.      Derm:  Exam: Good skin turgor, no skin breakdown.      :   Exam: Valdivia catheter in place.     LABS  --------------------------------------------------------------------------------------    BMP (06-03 @ 23:17)             116<LL>  |  85<L>   |  14    		Ca++ --      Ca 9.5                ---------------------------------( 114<H>		Mg 1.8                5.3     |  15<L>   |  0.72  			Ph 2.7       LFTs (06-03 @ 23:17)      TPro 6.6 / Alb 4.1 / TBili 2.5<H> / DBili <0.2 / AST 41<H> / ALT 35 / AlkPhos 66    Coags (06-03 @ 23:17)  aPTT 21.0<L> / INR 0.98 / PT 11.2      --------------------------------------------------------------------------------------    ASSESSMENT:  60 y.o. male with right sided transglottic laryngeal mass    PLAN:   Neurologic: A&Ox4, in no acute distress laying comfortably, no tremors  Respiratory: Patent airway, Sats > 97% on RA, not using accessory muscles   Cardiovascular: hemodynamically stable, not tachycardic, MAP > 65  Gastrointestinal/Nutrition: NPO, plan as add on per ENT  Renal/Genitourinary: strict Is and Os, NA 75mL/hr, Q6 BMP  Hematologic: trend CBCs  Infectious Disease: JAMMIE  Lines/Tubes: PIV  Endocrine: hx of hypothyroidism  Disposition: SICU    --------------------------------------------------------------------------------------    Critical Care Diagnoses: Laryngeal cancer   SICU Consultation Note  =====================================================  HPI: 60 y.o. male with PMHx HTN, hypothyroidism (does not take any medication at home), smoking (1 ppd x 46 years) and alcohol abuse (10 16 oz beers x 19 years), PSH perforated cecum s/p hemicolectomy and end ileostomy in 2018 who was transferred from OSH for workup of a laryngeal mass. Pt initially noticed hoarse voice this past January. Pt presented to the OSH with SOB, difficulty breathing. From pt's records from OSH, CT neck showed bulky transglottic right sided mass, CT chest showed no intrathoracic mass. Pt was given solumedrol and duonebs and pt was transferred for further management. SICU consulted for [employer].    Overnight events:   - SICU recalled for Na 116  - pt accepted to SICU for hyponatremia     CURRENT MEDICATIONS:   --------------------------------------------------------------------------------------  Neurologic Medications    Respiratory Medications    Cardiovascular Medications    Gastrointestinal Medications  lactated ringers. 1000 milliLiter(s) IV Continuous <Continuous>    Genitourinary Medications    Hematologic/Oncologic Medications    Antimicrobial/Immunologic Medications    Endocrine/Metabolic Medications    Topical/Other Medications    --------------------------------------------------------------------------------------  ICU Vital Signs Last 24 Hrs  T(C): 36.7 (04 Jun 2021 00:17), Max: 36.8 (03 Jun 2021 18:18)  T(F): 98 (04 Jun 2021 00:17), Max: 98.2 (03 Jun 2021 18:18)  HR: 91 (04 Jun 2021 00:17) (91 - 112)  BP: 140/89 (04 Jun 2021 00:17) (124/87 - 145/92)  BP(mean): --  ABP: --  ABP(mean): --  RR: 17 (04 Jun 2021 00:17) (17 - 18)  SpO2: 98% (04 Jun 2021 00:17) (98% - 100%)    --------------------------------------------------------------------------------------  I&O's Detail    03 Jun 2021 07:01  -  04 Jun 2021 00:35  --------------------------------------------------------  IN:  Total IN: 0 mL    OUT:    Voided (mL): 400 mL  Total OUT: 400 mL    Total NET: -400 mL    --------------------------------------------------------------------------------------    EXAM:  General/Neuro    Exam: NAD, laying comfortably in bed, no tremor, alert, oriented x 3, no focal deficits,     Respiratory  Exam: Lungs clear to auscultation, Normal expansion/effort, patent airway, noticeable horse voice, Sats 97% on RA    Cardiovascular  Exam: S1, S2.  Regular rate and rhythm, not tachycardic. Cardiac Rhythm: Normal Sinus Rhythm  Vitals: 151/93, HR 97    GI  Exam: Abdomen soft, Non-tender, Non-distended. Current Diet:  NPO    Tubes/Lines/Drains    [x] Peripheral IV  [] Central Venous Line     	[] R	[] L	[] IJ	[] Fem	[] SC        Type:	    Date Placed:   [] Arterial Line		[] R	[] L	[] Fem	[] Rad	[] Ax	Date Placed:   [] PICC:         	[] Midline		[] Mediport           [] Urinary Catheter		Date Placed:     Extremities  Exam: Extremities warm, pink, well-perfused.      Derm:  Exam: Good skin turgor, no skin breakdown.      :   Exam: Valdivia catheter in place.     LABS  --------------------------------------------------------------------------------------    BMP (06-03 @ 23:17)             116<LL>  |  85<L>   |  14    		Ca++ --      Ca 9.5                ---------------------------------( 114<H>		Mg 1.8                5.3     |  15<L>   |  0.72  			Ph 2.7       LFTs (06-03 @ 23:17)      TPro 6.6 / Alb 4.1 / TBili 2.5<H> / DBili <0.2 / AST 41<H> / ALT 35 / AlkPhos 66    Coags (06-03 @ 23:17)  aPTT 21.0<L> / INR 0.98 / PT 11.2      --------------------------------------------------------------------------------------    ASSESSMENT:  60 y.o. male with right sided transglottic laryngeal mass    PLAN:   Neurologic: A&Ox4, in no acute distress laying comfortably, no tremors  Respiratory: Patent airway, Sats > 97% on RA, not using accessory muscles   Cardiovascular: hemodynamically stable, not tachycardic, MAP > 65  Gastrointestinal/Nutrition: NPO, plan as add on per ENT  Renal/Genitourinary: strict Is and Os, NA 75mL/hr, Q6 BMP  Hematologic: trend CBCs  Infectious Disease: JAMMIE  Lines/Tubes: PIV  Endocrine: hx of hypothyroidism  Disposition: SICU    --------------------------------------------------------------------------------------    Critical Care Diagnoses: Laryngeal cancer   SICU Consultation Note  =====================================================  HPI: 60 y.o. male with PMHx HTN, hypothyroidism (does not take any medication at home), smoking (1 ppd x 46 years) and alcohol abuse (10 16 oz beers x 19 years), PSH perforated cecum s/p hemicolectomy and end ileostomy in 2018 who was transferred from OSH for workup of a laryngeal mass. Pt initially noticed hoarse voice this past January. Pt presented to the OSH with SOB, difficulty breathing. From pt's records from OSH, CT neck showed bulky transglottic right sided mass, CT chest showed no intrathoracic mass. Pt was given solumedrol and duonebs and pt was transferred for further management. SICU consulted for [employer].    Overnight events:   - SICU recalled for Na 116  - pt accepted to SICU for hyponatremia     CURRENT MEDICATIONS:   --------------------------------------------------------------------------------------  Neurologic Medications    Respiratory Medications    Cardiovascular Medications    Gastrointestinal Medications  lactated ringers. 1000 milliLiter(s) IV Continuous <Continuous>    Genitourinary Medications    Hematologic/Oncologic Medications    Antimicrobial/Immunologic Medications    Endocrine/Metabolic Medications    Topical/Other Medications    --------------------------------------------------------------------------------------  ICU Vital Signs Last 24 Hrs  T(C): 36.7 (04 Jun 2021 00:17), Max: 36.8 (03 Jun 2021 18:18)  T(F): 98 (04 Jun 2021 00:17), Max: 98.2 (03 Jun 2021 18:18)  HR: 91 (04 Jun 2021 00:17) (91 - 112)  BP: 140/89 (04 Jun 2021 00:17) (124/87 - 145/92)  BP(mean): --  ABP: --  ABP(mean): --  RR: 17 (04 Jun 2021 00:17) (17 - 18)  SpO2: 98% (04 Jun 2021 00:17) (98% - 100%)    --------------------------------------------------------------------------------------  I&O's Detail    03 Jun 2021 07:01  -  04 Jun 2021 00:35  --------------------------------------------------------  IN:  Total IN: 0 mL    OUT:    Voided (mL): 400 mL  Total OUT: 400 mL    Total NET: -400 mL    --------------------------------------------------------------------------------------    EXAM:  General/Neuro    Exam: NAD, laying comfortably in bed, no tremor, alert, oriented x 3, no focal deficits,     Respiratory  Exam: Lungs clear to auscultation, Normal expansion/effort, patent airway, noticeable horse voice, Sats 97% on RA    Cardiovascular  Exam: S1, S2.  Regular rate and rhythm, not tachycardic. Cardiac Rhythm: Normal Sinus Rhythm  Vitals: 151/93, HR 97    GI  Exam: Abdomen soft, Non-tender, Non-distended. Current Diet:  NPO    Tubes/Lines/Drains    [x] Peripheral IV  [] Central Venous Line     	[] R	[] L	[] IJ	[] Fem	[] SC        Type:	    Date Placed:   [] Arterial Line		[] R	[] L	[] Fem	[] Rad	[] Ax	Date Placed:   [] PICC:         	[] Midline		[] Mediport           [] Urinary Catheter		Date Placed:     Extremities  Exam: Extremities warm, pink, well-perfused.      Derm:  Exam: Good skin turgor, no skin breakdown.      :   Exam: Valdivia catheter in place.     LABS  --------------------------------------------------------------------------------------    BMP (06-03 @ 23:17)             116<LL>  |  85<L>   |  14    		Ca++ --      Ca 9.5                ---------------------------------( 114<H>		Mg 1.8                5.3     |  15<L>   |  0.72  			Ph 2.7       LFTs (06-03 @ 23:17)      TPro 6.6 / Alb 4.1 / TBili 2.5<H> / DBili <0.2 / AST 41<H> / ALT 35 / AlkPhos 66    Coags (06-03 @ 23:17)  aPTT 21.0<L> / INR 0.98 / PT 11.2      --------------------------------------------------------------------------------------    ASSESSMENT:  60 y.o. male with right sided transglottic laryngeal mass, h/o ETOH abuse    PLAN:   Neurologic:  - Pain control w/ IV tylenol PRN  - Ativan q1 hrs for possible ETOH withdrawal, so far not requiring    Respiratory:   - no respiratory distress  - ENT plan for OR today for biopsy, possible trach    Cardiovascular:   - no active issues    Gastrointestinal/Nutrition:   - NPO  - enteral plans per ENT to be discussed further postop    Renal/Genitourinary:   -Monitoring hyponatremia on NS @ 50cc/hr  -strict Is and Os  -Q6 BMP    Hematologic:   - DVT ppx: Lovenox    Infectious Disease:  - no active issues    Lines/Tubes: PIV    Endocrine:   -hx of hypothyroidism, states does not take synthroid at home.   -Will follow up AM TSH.    Disposition: SICU    --------------------------------------------------------------------------------------    Critical Care Diagnoses: Laryngeal cancer, hyponatremia, ETOH abuse

## 2021-06-04 NOTE — PROGRESS NOTE ADULT - SUBJECTIVE AND OBJECTIVE BOX
ENT Progress Note    HPI: Patient is a 60y Male with unknown PMHx (?stomach cancer, s/p colostomy tube 2018) who presents as a transfer from OSH for workup of a laryngeal mass. Per pt has been having SOB on exertion and laying flat, dysphonia for the past 4 weeks and it is progressing. Pt states he has a heavy history of smoking (1 ppd x 46 years) and alcohol abuse (10 16 oz beers x 19 years). Pt denies fevers/weight loss/chills/SOB. States he does not think he has trouble eating/denies dysphagia.    Patient has R laryngeal mass likely subglottic extending to cricoid.    INTERVAL:    6/4: NAEON. Breathing comfortably on RA currently. Plan for OR today    ICU Vital Signs Last 24 Hrs  T(C): 36.2 (04 Jun 2021 08:00), Max: 36.8 (03 Jun 2021 18:18)  T(F): 97.1 (04 Jun 2021 08:00), Max: 98.2 (03 Jun 2021 18:18)  HR: 75 (04 Jun 2021 08:00) (75 - 112)  BP: 150/90 (04 Jun 2021 08:00) (120/66 - 150/90)  BP(mean): 103 (04 Jun 2021 08:00) (88 - 103)  ABP: --  ABP(mean): --  RR: 12 (04 Jun 2021 08:00) (12 - 19)  SpO2: 99% (04 Jun 2021 08:00) (98% - 100%)    Voice quality: raspy and hoarse   Face:  Symmetric without masses or lesions  OU: EOMI   Nose: nasal cavity clear bilaterally, inferior turbinates normal, mucosa normal without crusting or bleeding  OC/OP: tongue normal, floor of mouth wnl, no masses or lesions, OP clear  Neck: soft/flat, no LAD palpated.   CNII-XII intact    Procedure: Flexible laryngoscopy    Pre-procedure diagnosis/Indication for procedure: To evaluate larynx    Anesthesia: None    Description:    A flexible endoscope was used to examine the left and right nasal cavities, posterior oropharynx, hypopharynx, and larynx. The nasal valve areas were examined for abnormalities or collapse. The inferior and middle turbinates were evaluated. The middle and superior meatuses, the sphenoethmoid recesses, and the nasopharynx were examined and inspected for mucopurulence, polyps, and nasal masses. The oropharynx, tongue base/vallecula, epiglottis, aryepiglottic folds, arytenoids, vocal cords, hypopharynx were also inspected for swelling, inflammation, or dysfunction. The patient tolerated the procedure without complications.    Findings:    NP wnl  BOT/vallecula normal  Epiglottis sharp  AE folds nonedematous  Arytenoids mobile  R TVC immobile   Unable to fully visualize mass on exam   No masses or lesions visualized in post cricoid space or pyriform sinuses bilaterally

## 2021-06-05 LAB
ALBUMIN SERPL ELPH-MCNC: 4 G/DL — SIGNIFICANT CHANGE UP (ref 3.3–5)
ALP SERPL-CCNC: 65 U/L — SIGNIFICANT CHANGE UP (ref 40–120)
ALT FLD-CCNC: 41 U/L — SIGNIFICANT CHANGE UP (ref 4–41)
ANION GAP SERPL CALC-SCNC: 12 MMOL/L — SIGNIFICANT CHANGE UP (ref 7–14)
ANION GAP SERPL CALC-SCNC: 13 MMOL/L — SIGNIFICANT CHANGE UP (ref 7–14)
ANION GAP SERPL CALC-SCNC: 14 MMOL/L — SIGNIFICANT CHANGE UP (ref 7–14)
ANION GAP SERPL CALC-SCNC: 15 MMOL/L — HIGH (ref 7–14)
AST SERPL-CCNC: 31 U/L — SIGNIFICANT CHANGE UP (ref 4–40)
BILIRUB SERPL-MCNC: 2.6 MG/DL — HIGH (ref 0.2–1.2)
BUN SERPL-MCNC: 11 MG/DL — SIGNIFICANT CHANGE UP (ref 7–23)
CALCIUM SERPL-MCNC: 8.6 MG/DL — SIGNIFICANT CHANGE UP (ref 8.4–10.5)
CALCIUM SERPL-MCNC: 8.8 MG/DL — SIGNIFICANT CHANGE UP (ref 8.4–10.5)
CALCIUM SERPL-MCNC: 8.8 MG/DL — SIGNIFICANT CHANGE UP (ref 8.4–10.5)
CALCIUM SERPL-MCNC: 9.2 MG/DL — SIGNIFICANT CHANGE UP (ref 8.4–10.5)
CHLORIDE SERPL-SCNC: 90 MMOL/L — LOW (ref 98–107)
CHLORIDE SERPL-SCNC: 90 MMOL/L — LOW (ref 98–107)
CHLORIDE SERPL-SCNC: 91 MMOL/L — LOW (ref 98–107)
CHLORIDE SERPL-SCNC: 91 MMOL/L — LOW (ref 98–107)
CO2 SERPL-SCNC: 18 MMOL/L — LOW (ref 22–31)
CO2 SERPL-SCNC: 22 MMOL/L — SIGNIFICANT CHANGE UP (ref 22–31)
CREAT SERPL-MCNC: 0.64 MG/DL — SIGNIFICANT CHANGE UP (ref 0.5–1.3)
CREAT SERPL-MCNC: 0.75 MG/DL — SIGNIFICANT CHANGE UP (ref 0.5–1.3)
CREAT SERPL-MCNC: 0.76 MG/DL — SIGNIFICANT CHANGE UP (ref 0.5–1.3)
CREAT SERPL-MCNC: 0.81 MG/DL — SIGNIFICANT CHANGE UP (ref 0.5–1.3)
GLUCOSE SERPL-MCNC: 106 MG/DL — HIGH (ref 70–99)
GLUCOSE SERPL-MCNC: 121 MG/DL — HIGH (ref 70–99)
GLUCOSE SERPL-MCNC: 125 MG/DL — HIGH (ref 70–99)
GLUCOSE SERPL-MCNC: 90 MG/DL — SIGNIFICANT CHANGE UP (ref 70–99)
HCT VFR BLD CALC: 46 % — SIGNIFICANT CHANGE UP (ref 39–50)
HGB BLD-MCNC: 16.2 G/DL — SIGNIFICANT CHANGE UP (ref 13–17)
MAGNESIUM SERPL-MCNC: 1.8 MG/DL — SIGNIFICANT CHANGE UP (ref 1.6–2.6)
MCHC RBC-ENTMCNC: 34 PG — SIGNIFICANT CHANGE UP (ref 27–34)
MCHC RBC-ENTMCNC: 35.2 GM/DL — SIGNIFICANT CHANGE UP (ref 32–36)
MCV RBC AUTO: 96.4 FL — SIGNIFICANT CHANGE UP (ref 80–100)
NRBC # BLD: 0 /100 WBCS — SIGNIFICANT CHANGE UP
NRBC # FLD: 0 K/UL — SIGNIFICANT CHANGE UP
PHOSPHATE SERPL-MCNC: 4.8 MG/DL — HIGH (ref 2.5–4.5)
PLATELET # BLD AUTO: 112 K/UL — LOW (ref 150–400)
POTASSIUM SERPL-MCNC: 3.7 MMOL/L — SIGNIFICANT CHANGE UP (ref 3.5–5.3)
POTASSIUM SERPL-MCNC: 4.3 MMOL/L — SIGNIFICANT CHANGE UP (ref 3.5–5.3)
POTASSIUM SERPL-MCNC: 4.7 MMOL/L — SIGNIFICANT CHANGE UP (ref 3.5–5.3)
POTASSIUM SERPL-MCNC: 5.6 MMOL/L — HIGH (ref 3.5–5.3)
POTASSIUM SERPL-SCNC: 3.7 MMOL/L — SIGNIFICANT CHANGE UP (ref 3.5–5.3)
POTASSIUM SERPL-SCNC: 4.3 MMOL/L — SIGNIFICANT CHANGE UP (ref 3.5–5.3)
POTASSIUM SERPL-SCNC: 4.7 MMOL/L — SIGNIFICANT CHANGE UP (ref 3.5–5.3)
POTASSIUM SERPL-SCNC: 5.6 MMOL/L — HIGH (ref 3.5–5.3)
PROT SERPL-MCNC: 6.4 G/DL — SIGNIFICANT CHANGE UP (ref 6–8.3)
RBC # BLD: 4.77 M/UL — SIGNIFICANT CHANGE UP (ref 4.2–5.8)
RBC # FLD: 11.9 % — SIGNIFICANT CHANGE UP (ref 10.3–14.5)
SODIUM SERPL-SCNC: 122 MMOL/L — LOW (ref 135–145)
SODIUM SERPL-SCNC: 124 MMOL/L — LOW (ref 135–145)
SODIUM SERPL-SCNC: 126 MMOL/L — LOW (ref 135–145)
SODIUM SERPL-SCNC: 128 MMOL/L — LOW (ref 135–145)
TSH SERPL-MCNC: 0.57 UIU/ML — SIGNIFICANT CHANGE UP (ref 0.27–4.2)
WBC # BLD: 7.62 K/UL — SIGNIFICANT CHANGE UP (ref 3.8–10.5)
WBC # FLD AUTO: 7.62 K/UL — SIGNIFICANT CHANGE UP (ref 3.8–10.5)

## 2021-06-05 PROCEDURE — 99233 SBSQ HOSP IP/OBS HIGH 50: CPT

## 2021-06-05 RX ORDER — PANTOPRAZOLE SODIUM 20 MG/1
40 TABLET, DELAYED RELEASE ORAL DAILY
Refills: 0 | Status: DISCONTINUED | OUTPATIENT
Start: 2021-06-05 | End: 2021-06-06

## 2021-06-05 RX ORDER — PANTOPRAZOLE SODIUM 20 MG/1
40 TABLET, DELAYED RELEASE ORAL
Refills: 0 | Status: DISCONTINUED | OUTPATIENT
Start: 2021-06-05 | End: 2021-06-05

## 2021-06-05 RX ADMIN — SODIUM CHLORIDE 75 MILLILITER(S): 9 INJECTION INTRAMUSCULAR; INTRAVENOUS; SUBCUTANEOUS at 10:26

## 2021-06-05 RX ADMIN — Medication 400 MILLIGRAM(S): at 06:10

## 2021-06-05 RX ADMIN — ENOXAPARIN SODIUM 40 MILLIGRAM(S): 100 INJECTION SUBCUTANEOUS at 12:38

## 2021-06-05 RX ADMIN — Medication 400 MILLIGRAM(S): at 12:27

## 2021-06-05 RX ADMIN — Medication 400 MILLIGRAM(S): at 01:03

## 2021-06-05 RX ADMIN — PANTOPRAZOLE SODIUM 40 MILLIGRAM(S): 20 TABLET, DELAYED RELEASE ORAL at 12:38

## 2021-06-05 NOTE — PROGRESS NOTE ADULT - SUBJECTIVE AND OBJECTIVE BOX
SICU Daily Progress Note  =====================================================  Interval/Overnight Events:   CT neck and chest ordered for staging  OR for awake trach and biopsy  Psych consulted for agitation and alcohol withdrawl      HPI:  60 y.o. male with PMHx HTN, hypothyroidism (does not take any medication at home), smoking (1 ppd x 46 years) and alcohol abuse (10 16 oz beers x 19 years), PSH perforated cecum s/p hemicolectomy and end ileostomy in 2018 who was transferred from OSH for workup of a laryngeal mass. Pt initially noticed hoarse voice this past January. Pt presented to the OSH with SOB, difficulty breathing. From pt's records from OSH, CT neck showed bulky transglottic right sided mass, CT chest showed no intrathoracic mass. Pt was given solumedrol and duonebs and pt was transferred for further management. SICU consulted for [employer].      Allergies: Allergy Status Unknown      MEDICATIONS:   --------------------------------------------------------------------------------------  Neurologic Medications  acetaminophen  IVPB .. 1000 milliGRAM(s) IV Intermittent every 6 hours  LORazepam   Injectable 1 milliGRAM(s) IV Push once PRN Agitation    Respiratory Medications    Cardiovascular Medications    Gastrointestinal Medications  pantoprazole  Injectable 40 milliGRAM(s) IV Push daily  sodium chloride 0.9%. 1000 milliLiter(s) IV Continuous <Continuous>    Genitourinary Medications    Hematologic/Oncologic Medications  enoxaparin Injectable 40 milliGRAM(s) SubCutaneous daily    Antimicrobial/Immunologic Medications    Endocrine/Metabolic Medications    Topical/Other Medications    --------------------------------------------------------------------------------------    VITAL SIGNS, INS/OUTS (last 24 hours):  --------------------------------------------------------------------------------------  ICU Vital Signs Last 24 Hrs  T(C): 36.7 (05 Jun 2021 00:00), Max: 36.8 (04 Jun 2021 20:00)  T(F): 98 (05 Jun 2021 00:00), Max: 98.2 (04 Jun 2021 20:00)  HR: 61 (05 Jun 2021 00:00) (58 - 105)  BP: 156/83 (05 Jun 2021 00:00) (120/66 - 156/83)  BP(mean): 102 (05 Jun 2021 00:00) (87 - 107)  ABP: --  ABP(mean): --  RR: 12 (05 Jun 2021 00:00) (11 - 21)  SpO2: 100% (05 Jun 2021 00:00) (99% - 100%)  --------------------------------------------------------------------------------------    EXAM  NEUROLOGY  Exam: Normal, NAD, alert, no focal deficits    HEENT  Exam: Normocephalic, atraumatic    RESPIRATORY  Exam: Trach in place currently trach collar, Normal expansion/effort    CARDIOVASCULAR  Exam: Regular rate and rhythm.     GI/NUTRITION  Exam: Abdomen soft, Non-tender, Non-distended  Current Diet:  NPO    VASCULAR  Exam: Extremities warm, pink, well-perfused    MUSCULOSKELETAL  Exam: All extremities moving spontaneously without limitations    SKIN  Exam: Good skin turgor, no skin breakdown    METABOLIC/FLUIDS/ELECTROLYTES  sodium chloride 0.9%. 1000 milliLiter(s) IV Continuous <Continuous>      HEMATOLOGIC  [x] VTE Prophylaxis: enoxaparin Injectable 40 milliGRAM(s) SubCutaneous daily    Transfusions:	[] PRBC	[] Platelets		[] FFP	[] Cryoprecipitate    INFECTIOUS DISEASE  Antimicrobials/Immunologic Medications:      Tubes/Lines/Drains   [x] Peripheral IV  [] Central Venous Line     	[] R	[] L	[] IJ	[] Fem	[] SC	Date Placed:   [] Arterial Line		[] R	[] L	[] Fem	[] Rad	[] Ax	Date Placed:   [] PICC		[] Midline		[] Mediport  [] Urinary Catheter		Date Placed:   [x] Necessity of urinary, arterial, and venous catheters discussed    LABS  --------------------------------------------------------------------------------------                        11.9   5.79  )-----------( 101      ( 04 Jun 2021 04:29 )             32.6     06-04    124<L>  |  91<L>  |  10  ----------------------------<  129<H>  4.6   |  22  |  0.71    Ca    8.7      04 Jun 2021 20:56  Phos  3.8     06-04  Mg     1.8     06-04    TPro  6.6  /  Alb  4.1  /  TBili  2.5<H>  /  DBili  <0.2  /  AST  41<H>  /  ALT  35  /  AlkPhos  66  06-03    --------------------------------------------------------------------------------------    OTHER LABORATORY:     IMAGING STUDIES:        SICU Daily Progress Note  =====================================================  Interval/Overnight Events:   -Trach uncuffed  -Passed bedside swallow, currently on puree and clear liquids (dysphagia 1 diet)  -Na improving, most recent 128      HPI:  60 y.o. male with PMHx HTN, hypothyroidism (does not take any medication at home), smoking (1 ppd x 46 years) and alcohol abuse (10 16 oz beers x 19 years), PSH perforated cecum s/p hemicolectomy and end ileostomy in 2018 who was transferred from OSH for workup of a laryngeal mass. Pt initially noticed hoarse voice this past January. Pt presented to the OSH with SOB, difficulty breathing. From pt's records from OSH, CT neck showed bulky transglottic right sided mass, CT chest showed no intrathoracic mass. Pt was given solumedrol and duonebs and pt was transferred for further management. SICU consulted for [employer].      Allergies: Allergy Status Unknown      MEDICATIONS:   --------------------------------------------------------------------------------------  Neurologic Medications  acetaminophen  IVPB .. 1000 milliGRAM(s) IV Intermittent every 6 hours  LORazepam   Injectable 1 milliGRAM(s) IV Push once PRN Agitation    Respiratory Medications    Cardiovascular Medications    Gastrointestinal Medications  pantoprazole  Injectable 40 milliGRAM(s) IV Push daily  sodium chloride 0.9%. 1000 milliLiter(s) IV Continuous <Continuous>    Genitourinary Medications    Hematologic/Oncologic Medications  enoxaparin Injectable 40 milliGRAM(s) SubCutaneous daily    Antimicrobial/Immunologic Medications    Endocrine/Metabolic Medications    Topical/Other Medications    --------------------------------------------------------------------------------------    VITAL SIGNS, INS/OUTS (last 24 hours):  --------------------------------------------------------------------------------------  ICU Vital Signs Last 24 Hrs  T(C): 36.7 (05 Jun 2021 00:00), Max: 36.8 (04 Jun 2021 20:00)  T(F): 98 (05 Jun 2021 00:00), Max: 98.2 (04 Jun 2021 20:00)  HR: 61 (05 Jun 2021 00:00) (58 - 105)  BP: 156/83 (05 Jun 2021 00:00) (120/66 - 156/83)  BP(mean): 102 (05 Jun 2021 00:00) (87 - 107)  ABP: --  ABP(mean): --  RR: 12 (05 Jun 2021 00:00) (11 - 21)  SpO2: 100% (05 Jun 2021 00:00) (99% - 100%)  --------------------------------------------------------------------------------------    EXAM  NEUROLOGY  Exam: Normal, NAD, alert, no focal deficits    HEENT  Exam: Normocephalic, atraumatic    RESPIRATORY  Exam: Trach in place currently trach collar, Normal expansion/effort    CARDIOVASCULAR  Exam: Regular rate and rhythm.     GI/NUTRITION  Exam: Abdomen soft, Non-tender, Non-distended  Current Diet:  NPO    VASCULAR  Exam: Extremities warm, pink, well-perfused    MUSCULOSKELETAL  Exam: All extremities moving spontaneously without limitations    SKIN  Exam: Good skin turgor, no skin breakdown    METABOLIC/FLUIDS/ELECTROLYTES  sodium chloride 0.9%. 1000 milliLiter(s) IV Continuous <Continuous>      HEMATOLOGIC  [x] VTE Prophylaxis: enoxaparin Injectable 40 milliGRAM(s) SubCutaneous daily    Transfusions:	[] PRBC	[] Platelets		[] FFP	[] Cryoprecipitate    INFECTIOUS DISEASE  Antimicrobials/Immunologic Medications:      Tubes/Lines/Drains   [x] Peripheral IV  [] Central Venous Line     	[] R	[] L	[] IJ	[] Fem	[] SC	Date Placed:   [] Arterial Line		[] R	[] L	[] Fem	[] Rad	[] Ax	Date Placed:   [] PICC		[] Midline		[] Mediport  [] Urinary Catheter		Date Placed:   [x] Necessity of urinary, arterial, and venous catheters discussed    LABS  --------------------------------------------------------------------------------------                        11.9   5.79  )-----------( 101      ( 04 Jun 2021 04:29 )             32.6     06-04    124<L>  |  91<L>  |  10  ----------------------------<  129<H>  4.6   |  22  |  0.71    Ca    8.7      04 Jun 2021 20:56  Phos  3.8     06-04  Mg     1.8     06-04    TPro  6.6  /  Alb  4.1  /  TBili  2.5<H>  /  DBili  <0.2  /  AST  41<H>  /  ALT  35  /  AlkPhos  66  06-03    --------------------------------------------------------------------------------------    OTHER LABORATORY:     IMAGING STUDIES:

## 2021-06-05 NOTE — PROGRESS NOTE ADULT - SUBJECTIVE AND OBJECTIVE BOX
POST ANESTHESIA EVALUATION    60y Male POSTOP DAY 1   MENTAL STATUS: Patient participation [ x ] Awake     [  ] Arousable     [  ] Sedated    AIRWAY PATENCY: [ x  ] Satisfactory  [  ] Other: tracheostomy in situ    Vital Signs Last 24 Hrs  T(C): 36.4 (05 Jun 2021 08:00), Max: 36.8 (04 Jun 2021 20:00)  T(F): 97.5 (05 Jun 2021 08:00), Max: 98.2 (04 Jun 2021 20:00)  HR: 88 (05 Jun 2021 10:00) (57 - 105)  BP: 163/91 (05 Jun 2021 10:00) (125/75 - 163/91)  BP(mean): 109 (05 Jun 2021 10:00) (87 - 111)  RR: 14 (05 Jun 2021 10:00) (10 - 21)  SpO2: 97% (05 Jun 2021 10:00) (97% - 100%)  I&O's Summary    04 Jun 2021 07:01  -  05 Jun 2021 07:00  --------------------------------------------------------  IN: 2375 mL / OUT: 1850 mL / NET: 525 mL    05 Jun 2021 07:01  -  05 Jun 2021 10:56  --------------------------------------------------------  IN: 300 mL / OUT: 0 mL / NET: 300 mL          HYDRATION STATUS:  [ x ] SATISFACTORY   [  ] OTHER    NAUSEA/ VOMITTING:  [ x ] NONE  [  ] CONTROLLED [  ] OTHER     PAIN: [ x ] CONTROLLED WITH CURRENT REGIMEN  [  ] OTHER    [ x ] NO APPARENT ANESTHESIA COMPLICATIONS      Comments:

## 2021-06-05 NOTE — PROGRESS NOTE ADULT - ASSESSMENT
ASSESSMENT:  60 y.o. male with right sided transglottic laryngeal mass, h/o ETOH abuse now s/p trach and biopsy 6/4. He was admitted to SICU for alcohol withdrawl concerns and placed on CIWA.    PLAN:   Neurologic:  - Pain control w/ IV tylenol PRN  - Ativan q1 hrs for possible ETOH withdrawal    Respiratory:   - no respiratory distress  - ENT s/p OR 6/4 for biopsy, possible trach    Cardiovascular:   - no active issues    Gastrointestinal/Nutrition:   - NPO  - enteral plans per ENT to be discussed further postop    Renal/Genitourinary:   -Monitoring hyponatremia on NS @ 50cc/hr  -strict Is and Os  -Q6 BMP    Hematologic:   - DVT ppx: Lovenox    Infectious Disease:  - no active issues    Lines/Tubes: PIV    Endocrine:   -hx of hypothyroidism, states does not take synthroid at home.   -Will follow up AM TSH.    Disposition: SICU ASSESSMENT:  60 y.o. male with right sided transglottic laryngeal mass, h/o ETOH abuse now s/p trach and biopsy 6/4. He was admitted to SICU for alcohol withdrawl concerns and placed on CIWA.    PLAN:   Neurologic: JAMMIE  -No longer on ativan    Respiratory:   - no respiratory distress, on trach collar  - Cuff deflated on 6/5 by ENT    Cardiovascular:   - no active issues    Gastrointestinal/Nutrition:   -Passed bedside swallow  -Dysphagia I diet w/ clear liquids and puree    Renal/Genitourinary:   -Monitoring hyponatremia on NS @ 75cc/hr  -Na improved to 128, continue trending  -strict Is and Os  -Q6 BMP    Hematologic:   - DVT ppx: Lovenox    Infectious Disease:  - no active issues    Lines/Tubes: PIV    Endocrine:   -hx of hypothyroidism, states does not take synthroid at home.   -Will follow up AM TSH.    Disposition: SICU

## 2021-06-05 NOTE — PROGRESS NOTE ADULT - SUBJECTIVE AND OBJECTIVE BOX
ENT Progress Note    HPI: Patient is a 60y Male with unknown PMHx (?stomach cancer, s/p colostomy tube 2018) who presents as a transfer from OSH for workup of a laryngeal mass. Per pt has been having SOB on exertion and laying flat, dysphonia for the past 4 weeks and it is progressing. Pt states he has a heavy history of smoking (1 ppd x 46 years) and alcohol abuse (10 16 oz beers x 19 years). Pt denies fevers/weight loss/chills/SOB. States he does not think he has trouble eating/denies dysphagia.    Patient has R laryngeal mass likely subglottic extending to cricoid.    INTERVAL:    6/4: NAEON. Breathing comfortably on RA currently. Plan for OR today  6/5: sp trach yesterday    Vital Signs Last 24 Hrs  T(C): 36.4 (05 Jun 2021 04:00), Max: 36.8 (04 Jun 2021 20:00)  T(F): 97.6 (05 Jun 2021 04:00), Max: 98.2 (04 Jun 2021 20:00)  HR: 75 (05 Jun 2021 06:00) (57 - 105)  BP: 154/98 (05 Jun 2021 06:00) (125/75 - 156/83)  BP(mean): 110 (05 Jun 2021 06:00) (87 - 111)  RR: 16 (05 Jun 2021 06:00) (10 - 21)  SpO2: 99% (05 Jun 2021 06:00) (99% - 100%)    on trach collar. trach 6LPC in place, sutured w trach ties secured. no bleeding around trach. trach cuff taken down  Neck: soft/flat, no LAD palpated.   CNII-XII intact    A/P:  60 p/w laryngeal mass. sp trach 6LPC  - bedside swallow assessment from SLP today  - F/u CTS on possible PEG during procedure  - CIWA protocol - SICU  Please perform standard tracheostomy care procedures.  - Regular suctioning with soft suction catheter q1  - Humidified air to tracheostomy tube  - We will change to cuffless trach POD5  - Call or page us if any issues

## 2021-06-06 LAB
ANION GAP SERPL CALC-SCNC: 11 MMOL/L — SIGNIFICANT CHANGE UP (ref 7–14)
ANION GAP SERPL CALC-SCNC: 12 MMOL/L — SIGNIFICANT CHANGE UP (ref 7–14)
ANION GAP SERPL CALC-SCNC: 12 MMOL/L — SIGNIFICANT CHANGE UP (ref 7–14)
ANION GAP SERPL CALC-SCNC: 14 MMOL/L — SIGNIFICANT CHANGE UP (ref 7–14)
BUN SERPL-MCNC: 10 MG/DL — SIGNIFICANT CHANGE UP (ref 7–23)
BUN SERPL-MCNC: 12 MG/DL — SIGNIFICANT CHANGE UP (ref 7–23)
BUN SERPL-MCNC: 12 MG/DL — SIGNIFICANT CHANGE UP (ref 7–23)
BUN SERPL-MCNC: 9 MG/DL — SIGNIFICANT CHANGE UP (ref 7–23)
CALCIUM SERPL-MCNC: 8.3 MG/DL — LOW (ref 8.4–10.5)
CALCIUM SERPL-MCNC: 8.7 MG/DL — SIGNIFICANT CHANGE UP (ref 8.4–10.5)
CHLORIDE SERPL-SCNC: 91 MMOL/L — LOW (ref 98–107)
CHLORIDE SERPL-SCNC: 92 MMOL/L — LOW (ref 98–107)
CHLORIDE SERPL-SCNC: 93 MMOL/L — LOW (ref 98–107)
CHLORIDE SERPL-SCNC: 94 MMOL/L — LOW (ref 98–107)
CHLORIDE UR-SCNC: 105 MMOL/L — SIGNIFICANT CHANGE UP
CO2 SERPL-SCNC: 16 MMOL/L — LOW (ref 22–31)
CO2 SERPL-SCNC: 17 MMOL/L — LOW (ref 22–31)
CO2 SERPL-SCNC: 19 MMOL/L — LOW (ref 22–31)
CO2 SERPL-SCNC: 19 MMOL/L — LOW (ref 22–31)
CREAT SERPL-MCNC: 0.57 MG/DL — SIGNIFICANT CHANGE UP (ref 0.5–1.3)
CREAT SERPL-MCNC: 0.71 MG/DL — SIGNIFICANT CHANGE UP (ref 0.5–1.3)
CREAT SERPL-MCNC: 0.71 MG/DL — SIGNIFICANT CHANGE UP (ref 0.5–1.3)
CREAT SERPL-MCNC: 0.72 MG/DL — SIGNIFICANT CHANGE UP (ref 0.5–1.3)
GLUCOSE BLDC GLUCOMTR-MCNC: 84 MG/DL — SIGNIFICANT CHANGE UP (ref 70–99)
GLUCOSE SERPL-MCNC: 65 MG/DL — LOW (ref 70–99)
GLUCOSE SERPL-MCNC: 83 MG/DL — SIGNIFICANT CHANGE UP (ref 70–99)
GLUCOSE SERPL-MCNC: 86 MG/DL — SIGNIFICANT CHANGE UP (ref 70–99)
GLUCOSE SERPL-MCNC: 90 MG/DL — SIGNIFICANT CHANGE UP (ref 70–99)
HCT VFR BLD CALC: 46.8 % — SIGNIFICANT CHANGE UP (ref 39–50)
HGB BLD-MCNC: 16 G/DL — SIGNIFICANT CHANGE UP (ref 13–17)
MAGNESIUM SERPL-MCNC: 1.4 MG/DL — LOW (ref 1.6–2.6)
MAGNESIUM SERPL-MCNC: 1.5 MG/DL — LOW (ref 1.6–2.6)
MCHC RBC-ENTMCNC: 34.2 GM/DL — SIGNIFICANT CHANGE UP (ref 32–36)
MCHC RBC-ENTMCNC: 34.2 PG — HIGH (ref 27–34)
MCV RBC AUTO: 100 FL — SIGNIFICANT CHANGE UP (ref 80–100)
NRBC # BLD: 0 /100 WBCS — SIGNIFICANT CHANGE UP
NRBC # FLD: 0 K/UL — SIGNIFICANT CHANGE UP
OSMOLALITY UR: 634 MOSM/KG — SIGNIFICANT CHANGE UP (ref 50–1200)
PHOSPHATE SERPL-MCNC: 2.7 MG/DL — SIGNIFICANT CHANGE UP (ref 2.5–4.5)
PHOSPHATE SERPL-MCNC: SIGNIFICANT CHANGE UP MG/DL (ref 2.5–4.5)
PLATELET # BLD AUTO: 91 K/UL — LOW (ref 150–400)
POTASSIUM SERPL-MCNC: 3.9 MMOL/L — SIGNIFICANT CHANGE UP (ref 3.5–5.3)
POTASSIUM SERPL-MCNC: 5.1 MMOL/L — SIGNIFICANT CHANGE UP (ref 3.5–5.3)
POTASSIUM SERPL-MCNC: 5.3 MMOL/L — SIGNIFICANT CHANGE UP (ref 3.5–5.3)
POTASSIUM SERPL-MCNC: SIGNIFICANT CHANGE UP MMOL/L (ref 3.5–5.3)
POTASSIUM SERPL-SCNC: 3.9 MMOL/L — SIGNIFICANT CHANGE UP (ref 3.5–5.3)
POTASSIUM SERPL-SCNC: 5.1 MMOL/L — SIGNIFICANT CHANGE UP (ref 3.5–5.3)
POTASSIUM SERPL-SCNC: 5.3 MMOL/L — SIGNIFICANT CHANGE UP (ref 3.5–5.3)
POTASSIUM SERPL-SCNC: SIGNIFICANT CHANGE UP MMOL/L (ref 3.5–5.3)
POTASSIUM UR-SCNC: 49.3 MMOL/L — SIGNIFICANT CHANGE UP
RBC # BLD: 4.68 M/UL — SIGNIFICANT CHANGE UP (ref 4.2–5.8)
RBC # FLD: 12.1 % — SIGNIFICANT CHANGE UP (ref 10.3–14.5)
SODIUM SERPL-SCNC: 120 MMOL/L — CRITICAL LOW (ref 135–145)
SODIUM SERPL-SCNC: 121 MMOL/L — LOW (ref 135–145)
SODIUM SERPL-SCNC: 124 MMOL/L — LOW (ref 135–145)
SODIUM SERPL-SCNC: 125 MMOL/L — LOW (ref 135–145)
SODIUM UR-SCNC: 59 MMOL/L — SIGNIFICANT CHANGE UP
WBC # BLD: 8.09 K/UL — SIGNIFICANT CHANGE UP (ref 3.8–10.5)
WBC # FLD AUTO: 8.09 K/UL — SIGNIFICANT CHANGE UP (ref 3.8–10.5)

## 2021-06-06 PROCEDURE — 99233 SBSQ HOSP IP/OBS HIGH 50: CPT

## 2021-06-06 RX ORDER — MAGNESIUM SULFATE 500 MG/ML
2 VIAL (ML) INJECTION ONCE
Refills: 0 | Status: DISCONTINUED | OUTPATIENT
Start: 2021-06-06 | End: 2021-06-06

## 2021-06-06 RX ORDER — MAGNESIUM SULFATE 500 MG/ML
2 VIAL (ML) INJECTION ONCE
Refills: 0 | Status: COMPLETED | OUTPATIENT
Start: 2021-06-06 | End: 2021-06-06

## 2021-06-06 RX ADMIN — ENOXAPARIN SODIUM 40 MILLIGRAM(S): 100 INJECTION SUBCUTANEOUS at 11:41

## 2021-06-06 RX ADMIN — SODIUM CHLORIDE 75 MILLILITER(S): 9 INJECTION INTRAMUSCULAR; INTRAVENOUS; SUBCUTANEOUS at 08:36

## 2021-06-06 RX ADMIN — Medication 50 GRAM(S): at 20:29

## 2021-06-06 RX ADMIN — SODIUM CHLORIDE 75 MILLILITER(S): 9 INJECTION INTRAMUSCULAR; INTRAVENOUS; SUBCUTANEOUS at 19:40

## 2021-06-06 NOTE — SWALLOW BEDSIDE ASSESSMENT ADULT - ASR SWALLOW RECOMMEND DIAG
Cinesophagram to objectively assess the swallow mechanism given patient s/p new tracheostomy with findings of laryngeal mass/VFSS/MBS

## 2021-06-06 NOTE — PROGRESS NOTE ADULT - ASSESSMENT
ASSESSMENT:  60 y.o. male with right sided transglottic laryngeal mass, h/o ETOH abuse now s/p trach and biopsy 6/4. He was admitted to SICU for alcohol withdrawl concerns and placed on CIWA.    PLAN:   Neurologic: JAMMIE  -No longer on ativan    Respiratory:   - no respiratory distress, on trach collar  - Cuff deflated on 6/5 by ENT    Cardiovascular:   - no active issues    Gastrointestinal/Nutrition:   -Passed bedside swallow  -Dysphagia I diet w/ clear liquids and puree    Renal/Genitourinary:   -Monitoring hyponatremia on NS @ 75cc/hr  -Trend Na and other electrolytes  -strict Is and Os  -Q6 BMP    Hematologic:   - DVT ppx: Lovenox    Infectious Disease:  - no active issues    Lines/Tubes: PIV    Endocrine:   -hx of hypothyroidism, states does not take synthroid at home.   -TSH wnl    Disposition: SICU ASSESSMENT:  60 y.o. male with right sided transglottic laryngeal mass, h/o ETOH abuse now s/p trach and biopsy 6/4. He was admitted to SICU for alcohol withdrawl concerns and placed on CIWA.    PLAN:   Neurologic: JAMMIE  -No longer on ativan    Respiratory:   - no respiratory distress, on trach collar  - Cuff deflated on 6/5 by ENT    Cardiovascular:   - no active issues    Gastrointestinal/Nutrition:   -Passed bedside swallow  -Dysphagia I diet w/ clear liquids and puree  -F/u formal SLP evaluation    Renal/Genitourinary:   -Monitoring hyponatremia on NS @ 75cc/hr  -Trend Na and other electrolytes  -strict Is and Os  -Q6 BMP    Hematologic:   - DVT ppx: Lovenox    Infectious Disease:  - no active issues    Endocrine:   -hx of hypothyroidism, states does not take synthroid at home.   -TSH wnl    Lines/Tubes: PIV    Disposition: SICU

## 2021-06-06 NOTE — SWALLOW BEDSIDE ASSESSMENT ADULT - SWALLOW EVAL: DIAGNOSIS
Patient was given 4oz puree consistency and 4oz honey thick liquids mixed with green dye food coloring to subjectively assess for gross aspiration. Patient demonstrated functional oral phase and mild pharyngeal phase dysphagia. The oral phase was marked by adequate stripping of bolus from utensil, adequate oral containment, adequate bolus manipulation and functional oral transit time. The pharyngeal phase was marked by laryngeal elevation upon digital palpation with suspected delay in initiation of pharyngeal swallow. There was intermittent cough response throughout PO trials suggestive of laryngeal penetration/aspiration. RN provided immediate tracheal suctioning with no evidence of green dye return. RN provided tracheal suctioning one hour post deglutition and reported .... Patient was given 4oz puree consistency and 4oz honey thick liquids mixed with green dye food coloring to subjectively assess for gross aspiration. Patient demonstrated functional oral phase and mild pharyngeal phase dysphagia. The oral phase was marked by adequate stripping of bolus from utensil, adequate oral containment, adequate bolus manipulation and functional oral transit time. The pharyngeal phase was marked by laryngeal elevation upon digital palpation with suspected delay in initiation of pharyngeal swallow. There was intermittent cough response throughout PO trials suggestive of laryngeal penetration/aspiration. RN provided immediate tracheal suctioning with no evidence of green dye return. RN provided tracheal suctioning one hour post deglutition and reported no delayed green dye return.

## 2021-06-06 NOTE — PROGRESS NOTE ADULT - SUBJECTIVE AND OBJECTIVE BOX
ENT Progress Note    HPI: Patient is a 60y Male with unknown PMHx (?stomach cancer, s/p colostomy tube 2018) who presents as a transfer from OSH for workup of a laryngeal mass. Per pt has been having SOB on exertion and laying flat, dysphonia for the past 4 weeks and it is progressing. Pt states he has a heavy history of smoking (1 ppd x 46 years) and alcohol abuse (10 16 oz beers x 19 years). Pt denies fevers/weight loss/chills/SOB. States he does not think he has trouble eating/denies dysphagia.    Patient has R laryngeal mass likely subglottic extending to cricoid.    INTERVAL:    6/4: NAEON. Breathing comfortably on RA currently. Plan for OR today  6/5: sp trach yesterday  6/6 doing well, bedside SLP eval to be done today    T(C): 37.1 (06-06-21 @ 07:45), Max: 37.1 (06-06-21 @ 07:45)  HR: 100 (06-06-21 @ 09:00) (68 - 100)  BP: 117/95 (06-06-21 @ 07:45) (106/77 - 163/91)  RR: 18 (06-06-21 @ 09:00) (12 - 18)  SpO2: 100% (06-06-21 @ 09:00) (97% - 100%)  on trach collar. trach 6LPC in place, sutured w trach ties secured. no bleeding around trach. trach cuff taken down  Neck: soft/flat, no LAD palpated.   CNII-XII intact    A/P:  60 p/w laryngeal mass. sp trach 6LPC  -hyponatremic. Na 120  -f/u urine labs / SIADH workup   - fu SLP  - F/u CTS on possible PEG during procedure  - [facility] protocol - SICU  Please perform standard tracheostomy care procedures.  - Regular suctioning with soft suction catheter q1  - Humidified air to tracheostomy tube  - We will change to cuffless trach POD5  - Call or page us if any issues

## 2021-06-06 NOTE — PROGRESS NOTE ADULT - SUBJECTIVE AND OBJECTIVE BOX
SICU Daily Progress Note  =====================================================  Interval/Overnight Events:   -Trach uncuffed by ENT  -Passed bedside swallow, currently on puree and clear liquids (dysphagia 1 diet)      HPI:  60 y.o. male with PMHx HTN, hypothyroidism (does not take any medication at home), smoking (1 ppd x 46 years) and alcohol abuse (10 16 oz beers x 19 years), PSH perforated cecum s/p hemicolectomy and end ileostomy in 2018 who was transferred from OSH for workup of a laryngeal mass. Pt initially noticed hoarse voice this past January. Pt presented to the OSH with SOB, difficulty breathing. From pt's records from OSH, CT neck showed bulky transglottic right sided mass, CT chest showed no intrathoracic mass. Pt was given solumedrol and duonebs and pt was transferred for further management. SICU consulted for [employer].      Allergies: Allergy Status Unknown      MEDICATIONS:   --------------------------------------------------------------------------------------  Neurologic Medications  acetaminophen  IVPB .. 1000 milliGRAM(s) IV Intermittent every 6 hours  LORazepam   Injectable 1 milliGRAM(s) IV Push once PRN Agitation    Respiratory Medications    Cardiovascular Medications    Gastrointestinal Medications  pantoprazole  Injectable 40 milliGRAM(s) IV Push daily  sodium chloride 0.9%. 1000 milliLiter(s) IV Continuous <Continuous>    Genitourinary Medications    Hematologic/Oncologic Medications  enoxaparin Injectable 40 milliGRAM(s) SubCutaneous daily    Antimicrobial/Immunologic Medications    Endocrine/Metabolic Medications    Topical/Other Medications    --------------------------------------------------------------------------------------    VITAL SIGNS, INS/OUTS (last 24 hours):  --------------------------------------------------------------------------------------  ICU Vital Signs Last 24 Hrs  T(C): 36.7 (05 Jun 2021 00:00), Max: 36.8 (04 Jun 2021 20:00)  T(F): 98 (05 Jun 2021 00:00), Max: 98.2 (04 Jun 2021 20:00)  HR: 61 (05 Jun 2021 00:00) (58 - 105)  BP: 156/83 (05 Jun 2021 00:00) (120/66 - 156/83)  BP(mean): 102 (05 Jun 2021 00:00) (87 - 107)  ABP: --  ABP(mean): --  RR: 12 (05 Jun 2021 00:00) (11 - 21)  SpO2: 100% (05 Jun 2021 00:00) (99% - 100%)  --------------------------------------------------------------------------------------    EXAM  NEUROLOGY  Exam: Normal, NAD, alert, no focal deficits    HEENT  Exam: Normocephalic, atraumatic    RESPIRATORY  Exam: Trach in place currently trach collar, Normal expansion/effort    CARDIOVASCULAR  Exam: Regular rate and rhythm.     GI/NUTRITION  Exam: Abdomen soft, Non-tender, Non-distended  Current Diet:  NPO    VASCULAR  Exam: Extremities warm, pink, well-perfused    MUSCULOSKELETAL  Exam: All extremities moving spontaneously without limitations    SKIN  Exam: Good skin turgor, no skin breakdown    METABOLIC/FLUIDS/ELECTROLYTES  sodium chloride 0.9%. 1000 milliLiter(s) IV Continuous <Continuous>      HEMATOLOGIC  [x] VTE Prophylaxis: enoxaparin Injectable 40 milliGRAM(s) SubCutaneous daily    Transfusions:	[] PRBC	[] Platelets		[] FFP	[] Cryoprecipitate    INFECTIOUS DISEASE  Antimicrobials/Immunologic Medications:      Tubes/Lines/Drains   [x] Peripheral IV  [] Central Venous Line     	[] R	[] L	[] IJ	[] Fem	[] SC	Date Placed:   [] Arterial Line		[] R	[] L	[] Fem	[] Rad	[] Ax	Date Placed:   [] PICC		[] Midline		[] Mediport  [] Urinary Catheter		Date Placed:   [x] Necessity of urinary, arterial, and venous catheters discussed    LABS  --------------------------------------------------------------------------------------                        11.9   5.79  )-----------( 101      ( 04 Jun 2021 04:29 )             32.6     06-04    124<L>  |  91<L>  |  10  ----------------------------<  129<H>  4.6   |  22  |  0.71    Ca    8.7      04 Jun 2021 20:56  Phos  3.8     06-04  Mg     1.8     06-04    TPro  6.6  /  Alb  4.1  /  TBili  2.5<H>  /  DBili  <0.2  /  AST  41<H>  /  ALT  35  /  AlkPhos  66  06-03    --------------------------------------------------------------------------------------    OTHER LABORATORY:     IMAGING STUDIES:

## 2021-06-06 NOTE — SWALLOW BEDSIDE ASSESSMENT ADULT - SWALLOW EVAL: RECOMMENDED DIET
1. Oral means of nutrition/hydration/medication contraindicated 2. Consideration for short term non oral means of nutrition/hydration/medication at MD's discretion

## 2021-06-07 DIAGNOSIS — D69.6 THROMBOCYTOPENIA, UNSPECIFIED: ICD-10-CM

## 2021-06-07 DIAGNOSIS — E03.9 HYPOTHYROIDISM, UNSPECIFIED: ICD-10-CM

## 2021-06-07 DIAGNOSIS — J38.7 OTHER DISEASES OF LARYNX: ICD-10-CM

## 2021-06-07 DIAGNOSIS — E87.1 HYPO-OSMOLALITY AND HYPONATREMIA: ICD-10-CM

## 2021-06-07 DIAGNOSIS — Z29.9 ENCOUNTER FOR PROPHYLACTIC MEASURES, UNSPECIFIED: ICD-10-CM

## 2021-06-07 DIAGNOSIS — I10 ESSENTIAL (PRIMARY) HYPERTENSION: ICD-10-CM

## 2021-06-07 LAB
ANION GAP SERPL CALC-SCNC: 10 MMOL/L — SIGNIFICANT CHANGE UP (ref 7–14)
ANION GAP SERPL CALC-SCNC: 11 MMOL/L — SIGNIFICANT CHANGE UP (ref 7–14)
ANION GAP SERPL CALC-SCNC: 9 MMOL/L — SIGNIFICANT CHANGE UP (ref 7–14)
BUN SERPL-MCNC: 6 MG/DL — LOW (ref 7–23)
BUN SERPL-MCNC: 8 MG/DL — SIGNIFICANT CHANGE UP (ref 7–23)
BUN SERPL-MCNC: 8 MG/DL — SIGNIFICANT CHANGE UP (ref 7–23)
CALCIUM SERPL-MCNC: 8.5 MG/DL — SIGNIFICANT CHANGE UP (ref 8.4–10.5)
CALCIUM SERPL-MCNC: 8.5 MG/DL — SIGNIFICANT CHANGE UP (ref 8.4–10.5)
CALCIUM SERPL-MCNC: 8.6 MG/DL — SIGNIFICANT CHANGE UP (ref 8.4–10.5)
CHLORIDE SERPL-SCNC: 92 MMOL/L — LOW (ref 98–107)
CHLORIDE SERPL-SCNC: 94 MMOL/L — LOW (ref 98–107)
CHLORIDE SERPL-SCNC: 94 MMOL/L — LOW (ref 98–107)
CO2 SERPL-SCNC: 20 MMOL/L — LOW (ref 22–31)
CO2 SERPL-SCNC: 21 MMOL/L — LOW (ref 22–31)
CO2 SERPL-SCNC: 26 MMOL/L — SIGNIFICANT CHANGE UP (ref 22–31)
CREAT SERPL-MCNC: 0.63 MG/DL — SIGNIFICANT CHANGE UP (ref 0.5–1.3)
CREAT SERPL-MCNC: 0.64 MG/DL — SIGNIFICANT CHANGE UP (ref 0.5–1.3)
CREAT SERPL-MCNC: 0.65 MG/DL — SIGNIFICANT CHANGE UP (ref 0.5–1.3)
GLUCOSE SERPL-MCNC: 109 MG/DL — HIGH (ref 70–99)
GLUCOSE SERPL-MCNC: 87 MG/DL — SIGNIFICANT CHANGE UP (ref 70–99)
GLUCOSE SERPL-MCNC: 94 MG/DL — SIGNIFICANT CHANGE UP (ref 70–99)
HCT VFR BLD CALC: 41.5 % — SIGNIFICANT CHANGE UP (ref 39–50)
HGB BLD-MCNC: 14.6 G/DL — SIGNIFICANT CHANGE UP (ref 13–17)
MAGNESIUM SERPL-MCNC: 1.8 MG/DL — SIGNIFICANT CHANGE UP (ref 1.6–2.6)
MAGNESIUM SERPL-MCNC: 1.8 MG/DL — SIGNIFICANT CHANGE UP (ref 1.6–2.6)
MCHC RBC-ENTMCNC: 34 PG — SIGNIFICANT CHANGE UP (ref 27–34)
MCHC RBC-ENTMCNC: 35.2 GM/DL — SIGNIFICANT CHANGE UP (ref 32–36)
MCV RBC AUTO: 96.5 FL — SIGNIFICANT CHANGE UP (ref 80–100)
NRBC # BLD: 0 /100 WBCS — SIGNIFICANT CHANGE UP
NRBC # FLD: 0 K/UL — SIGNIFICANT CHANGE UP
PHOSPHATE SERPL-MCNC: 2.7 MG/DL — SIGNIFICANT CHANGE UP (ref 2.5–4.5)
PHOSPHATE SERPL-MCNC: 3 MG/DL — SIGNIFICANT CHANGE UP (ref 2.5–4.5)
PLATELET # BLD AUTO: 98 K/UL — LOW (ref 150–400)
POTASSIUM SERPL-MCNC: 3.8 MMOL/L — SIGNIFICANT CHANGE UP (ref 3.5–5.3)
POTASSIUM SERPL-SCNC: 3.8 MMOL/L — SIGNIFICANT CHANGE UP (ref 3.5–5.3)
RBC # BLD: 4.3 M/UL — SIGNIFICANT CHANGE UP (ref 4.2–5.8)
RBC # FLD: 11.9 % — SIGNIFICANT CHANGE UP (ref 10.3–14.5)
SODIUM SERPL-SCNC: 124 MMOL/L — LOW (ref 135–145)
SODIUM SERPL-SCNC: 125 MMOL/L — LOW (ref 135–145)
SODIUM SERPL-SCNC: 128 MMOL/L — LOW (ref 135–145)
WBC # BLD: 6.78 K/UL — SIGNIFICANT CHANGE UP (ref 3.8–10.5)
WBC # FLD AUTO: 6.78 K/UL — SIGNIFICANT CHANGE UP (ref 3.8–10.5)

## 2021-06-07 PROCEDURE — 99232 SBSQ HOSP IP/OBS MODERATE 35: CPT

## 2021-06-07 PROCEDURE — 74230 X-RAY XM SWLNG FUNCJ C+: CPT | Mod: 26

## 2021-06-07 PROCEDURE — 99223 1ST HOSP IP/OBS HIGH 75: CPT

## 2021-06-07 RX ORDER — POLYETHYLENE GLYCOL 3350 17 G/17G
17 POWDER, FOR SOLUTION ORAL DAILY
Refills: 0 | Status: DISCONTINUED | OUTPATIENT
Start: 2021-06-07 | End: 2021-06-24

## 2021-06-07 RX ORDER — SENNA PLUS 8.6 MG/1
2 TABLET ORAL AT BEDTIME
Refills: 0 | Status: DISCONTINUED | OUTPATIENT
Start: 2021-06-07 | End: 2021-06-24

## 2021-06-07 RX ORDER — OXYCODONE HYDROCHLORIDE 5 MG/1
10 TABLET ORAL EVERY 6 HOURS
Refills: 0 | Status: DISCONTINUED | OUTPATIENT
Start: 2021-06-07 | End: 2021-06-11

## 2021-06-07 RX ORDER — ACETAMINOPHEN 500 MG
650 TABLET ORAL EVERY 6 HOURS
Refills: 0 | Status: DISCONTINUED | OUTPATIENT
Start: 2021-06-07 | End: 2021-06-12

## 2021-06-07 RX ORDER — OXYCODONE HYDROCHLORIDE 5 MG/1
5 TABLET ORAL EVERY 6 HOURS
Refills: 0 | Status: DISCONTINUED | OUTPATIENT
Start: 2021-06-07 | End: 2021-06-11

## 2021-06-07 RX ORDER — NICOTINE POLACRILEX 2 MG
1 GUM BUCCAL DAILY
Refills: 0 | Status: DISCONTINUED | OUTPATIENT
Start: 2021-06-07 | End: 2021-06-12

## 2021-06-07 RX ORDER — MAGNESIUM SULFATE 500 MG/ML
2 VIAL (ML) INJECTION ONCE
Refills: 0 | Status: COMPLETED | OUTPATIENT
Start: 2021-06-07 | End: 2021-06-07

## 2021-06-07 RX ADMIN — ENOXAPARIN SODIUM 40 MILLIGRAM(S): 100 INJECTION SUBCUTANEOUS at 11:24

## 2021-06-07 RX ADMIN — Medication 50 GRAM(S): at 01:56

## 2021-06-07 RX ADMIN — SODIUM CHLORIDE 75 MILLILITER(S): 9 INJECTION INTRAMUSCULAR; INTRAVENOUS; SUBCUTANEOUS at 08:25

## 2021-06-07 NOTE — SWALLOW VFSS/MBS ASSESSMENT ADULT - COMMENTS
ENT Note 6/7/21: Patient is a 60y Male with unknown PMHx (?stomach cancer, s/p colostomy tube 2018) who presents as a transfer from OSH for workup of a laryngeal mass. Per pt has been having SOB on exertion and laying flat, dysphonia for the past 4 weeks and it is progressing. Pt states he has a heavy history of smoking (1 ppd x 46 years) and alcohol abuse (10 16 oz beers x 19 years). Pt denies fevers/weight loss/chills/SOB. States he does not think he has trouble eating/denies dysphagia. Patient has R laryngeal mass likely subglottic extending to cricoid.    Clinical bedside swallow evaluation completed on 6/6/21 (see note for details).     Patient was seen seated upright in specialized chair with #6 LPC Tracheostomy in place (Cuff Deflated) on Trach Collar Status and Radiologist present. SICU RN present in Radiology suite for medical management. Patient was alert/awake and fully cooperative for assessment.

## 2021-06-07 NOTE — CONSULT NOTE ADULT - PROBLEM SELECTOR RECOMMENDATION 4
Pt appears clinically euthyroid, and TSH is WNL. Consider repeating TSH along with free T3 and T4, as well as cortisol in AM.

## 2021-06-07 NOTE — CONSULT NOTE ADULT - PROBLEM SELECTOR RECOMMENDATION 2
No known baseline, but suspect chronic hyponatremia in setting EtOH dependence. Recommend holding NS at this point, encouraging po food intake (dysphagia diet), and restricting fluids to 1.5L/day. Please obtain labs in AM:   - BMP  - serum osmolality  - urine electrolytes  - urine osmolality

## 2021-06-07 NOTE — BH CONSULTATION LIAISON ASSESSMENT NOTE - CASE SUMMARY
Chart reviewed. Pt seen with trainee as above. Agree with above recs. No further psychiatric needs at this time, and will sign off. Please call as needed

## 2021-06-07 NOTE — CONSULT NOTE ADULT - PROBLEM SELECTOR RECOMMENDATION 9
Given history of tobacco and EtOH dependence, concern for squamous malignancy. F/u pathology. Post-op management per ENT.

## 2021-06-07 NOTE — BH CONSULTATION LIAISON ASSESSMENT NOTE - NSBHCONSULTRECOMMENDOTHER_PSY_A_CORE FT
Plan   may continue to monitor for any alcohol withdrawal signs (tremors, diaphoresis, tachycardia or elevated bp) though is likely out of the window   no standing medications  may provide nicotine patch for smoking cessation/withdrawal from nicotine   Pt may f/u at [facility] Adult Outpatient Psychiatry Department- [phone]   or [facility]- [phone]  [facility] Crisis clinic - [phone]

## 2021-06-07 NOTE — CONSULT NOTE ADULT - ASSESSMENT
60M with history of HTN, hypothyroidism, tobacco and EtOH dependence, who presents as transfer for work-up right laryngeal mass. Recent history of hoarse voice since 1/2021, now with progressive SOB.       ***INCOMPLETE*** 60M with history of HTN, hypothyroidism, tobacco and EtOH dependence, who presents as transfer for work-up right laryngeal mass. Recent history of hoarse voice since 1/2021, now with progressive SOB, found to have right laryngeal mass now s/p biopsy and trach.

## 2021-06-07 NOTE — CONSULT NOTE ADULT - PROBLEM SELECTOR RECOMMENDATION 5
Suspect chronic borderline/low platelets due to EtOH and possible portal disease. Given elevated bilirubin and AST/ALT, please obtain liver US.     Otherwise, pt does not appear to be in EtOH withdrawal.

## 2021-06-07 NOTE — BH CONSULTATION LIAISON ASSESSMENT NOTE - MSE UNSTRUCTURED FT
Calm cooperative and alert, pt has trach inplace and cannot respond verbally. able to write or answer yes and no questions.

## 2021-06-07 NOTE — BH CONSULTATION LIAISON ASSESSMENT NOTE - RISK ASSESSMENT
Risk factors: acute/chronic medical issues,  substance use    Protective factors: no current suicidal/homicidal ideations intent or plan, No previous suicide attempt or self injurious behavior, no h/o psych admissions,  no psychosis, domiciled, social supports, engaged in treatment, compliant with treatment, help-seeking behaviors, goal oriented with plans for the future

## 2021-06-07 NOTE — SWALLOW VFSS/MBS ASSESSMENT ADULT - RECOMMENDED CONSISTENCY
1. Mechanical Soft Solids and Nectar Thick Liquids  2. Alternate Liquids and Solids  3. Secondary re-swallow after each sip and bite  4. Small sips and bites only

## 2021-06-07 NOTE — SWALLOW VFSS/MBS ASSESSMENT ADULT - ADDITIONAL RECOMMENDATIONS
1. Monitor for PO tolerance/intake   2. Patient will benefit from follow up at the [facility] [phone] for swallow therapy and dysphagia management

## 2021-06-07 NOTE — CONSULT NOTE ADULT - SUBJECTIVE AND OBJECTIVE BOX
HPI: 60M with history of HTN, hypothyroidism, tobacco and EtOH dependence, who presents as transfer for work-up right laryngeal mass. Recent history of hoarse voice since 1/2021, now with progressive SOB.     ***INCOMPLETE***      PMHx: as above  PSHx: perforated cecum s/p hemicolectomy and end ileostomy 2018  Meds:  Allergies: NKDA  FHx: none  SHx: tobacco smoking 1ppd x 46 years; EtOH 10 16-oz. beers daily x 19 years    ROS: As per HPI, otherwise negative.    PHYSICAL EXAM:  Vital Signs Last 24 Hrs  T(C): 36.3 (07 Jun 2021 12:25), Max: 37.7 (06 Jun 2021 16:00)  T(F): 97.3 (07 Jun 2021 12:25), Max: 99.9 (06 Jun 2021 16:00)  HR: 89 (07 Jun 2021 12:25) (67 - 93)  BP: 140/83 (07 Jun 2021 12:25) (125/74 - 157/87)  BP(mean): 100 (07 Jun 2021 11:25) (82 - 100)  RR: 17 (07 Jun 2021 12:25) (14 - 21)  SpO2: 92% (07 Jun 2021 12:25) (92% - 100%)    CONSTITUTIONAL: NAD, well-developed, well-groomed  EYES: PERRLA; conjunctiva and sclera clear  ENMT: Moist oral mucosa, no pharyngeal injection or exudates; normal dentition  NECK: Supple, no palpable masses; no thyromegaly  RESPIRATORY: Normal respiratory effort; lungs are clear to auscultation bilaterally  CARDIOVASCULAR: Regular rate and rhythm, normal S1 and S2, no murmur/rub/gallop; No lower extremity edema; Peripheral pulses are 2+ bilaterally  ABDOMEN: Nontender to palpation, normoactive bowel sounds, no rebound/guarding; No hepatosplenomegaly      LABS:                        14.6   6.78  )-----------( 98       ( 07 Jun 2021 00:45 )             41.5     06-07    125<L>  |  94<L>  |  6<L>  ----------------------------<  87  3.8   |  20<L>  |  0.63    Ca    8.6      07 Jun 2021 06:37  Phos  2.7     06-07  Mg     1.8     06-07    Xray cinesophagram: gross aspiration with liquids    CT neck/chest (from outside hospital): bulky right-sided transglottic mass, no intrathoracic mass HPI: 60M with history of HTN, hypothyroidism, tobacco and EtOH dependence, who presents as transfer for work-up right laryngeal mass. Recent history of hoarse voice since 1/2021, now with progressive SOB. Pt denies dysphagia or other head/neck symptoms. No cough, no chest pain. Denies weight loss; in fact, states he has been able to put on some weight recently. Endorses daily EtOH, last drink 6/3/21. In general, he says he has otherwise been well. He does not follow up for medical care regularly and states he has not taken any prescribed medications for about a year.    PMHx: as above  PSHx: perforated cecum s/p hemicolectomy and end ileostomy 2018  Meds: none  Allergies: NKDA  FHx: none  SHx: tobacco smoking 1ppd x 46 years; EtOH 10 16-oz. beers daily x 19 years    ROS: As per HPI, otherwise negative.    PHYSICAL EXAM:  Vital Signs Last 24 Hrs  T(C): 36.3 (07 Jun 2021 12:25), Max: 37.7 (06 Jun 2021 16:00)  T(F): 97.3 (07 Jun 2021 12:25), Max: 99.9 (06 Jun 2021 16:00)  HR: 89 (07 Jun 2021 12:25) (67 - 93)  BP: 140/83 (07 Jun 2021 12:25) (125/74 - 157/87)  BP(mean): 100 (07 Jun 2021 11:25) (82 - 100)  RR: 17 (07 Jun 2021 12:25) (14 - 21)  SpO2: 92% (07 Jun 2021 12:25) (92% - 100%)    CONSTITUTIONAL: NAD, well-developed, well-groomed  EYES: pupils are 2mm and reactive   ENMT: Moist oral mucosa, no pharyngeal injection or exudates; normal dentition  NECK: trach site c/d/i, no LAD  RESPIRATORY: Normal respiratory effort; significant upper airway sounds make exam difficult but lungs seem grossly clear  CARDIOVASCULAR: Regular rate and rhythm, normal S1 and S2, no murmur/rub/gallop; No lower extremity edema; Peripheral pulses are 2+ bilaterally  ABDOMEN: Nontender to palpation, normoactive bowel sounds, no rebound/guarding; No hepatosplenomegaly      LABS:                        14.6   6.78  )-----------( 98       ( 07 Jun 2021 00:45 )             41.5     06-07    125<L>  |  94<L>  |  6<L>  ----------------------------<  87  3.8   |  20<L>  |  0.63    Ca    8.6      07 Jun 2021 06:37  Phos  2.7     06-07  Mg     1.8     06-07    Xray cinesophagram: gross aspiration with liquids    CT neck/chest (from outside hospital): bulky right-sided transglottic mass, no intrathoracic mass

## 2021-06-07 NOTE — BH CONSULTATION LIAISON ASSESSMENT NOTE - SUMMARY
pt is a 61 y/o male with no previous psychiatric hx, PMHx HTN, hypothyroidism (does not take any medication at home), smoking (1 ppd x 46 years) and alcohol abuse (10 16oz beers x 19 years), PSH perforated cecum s/p hemicolectomy and end ileostomy in 2018 who was transferred from OSH for workup of a laryngeal mass. Pt initially noticed hoarse voice this past January. Pt presented to the OSH with SOB, difficulty breathing. From pt's records from OSH, CT neck showed bulky transglottic right sided mass, CT chest showed no intrathoracic mass. Pt was given solumedrol and Duoneb and pt was transferred for further management. SICU consulted for hyponatremia and CIWA watch, last reported drink 4 days prior to admission. Patient s/p trach 6/4. psychiatry initially consulted for anxiety/alcohol use.     Pt was seen, pt had trach in place, pt was calm cooperative, alert. able to answers yes or no questions by gesturing with thumbs up or down or by writing down response. Denies acute depression, anxiety, or withdrawal currently. denies psychiatric hx. denies hx of withdrawal, legal issues , rehab or detox. He corroborates with 19 year drinking and smoking hx as stated above. Relates enjoying TV. Denies si/hi. denies a/v hallucinations. No current symptoms of alcohol withdrawal

## 2021-06-07 NOTE — BH CONSULTATION LIAISON ASSESSMENT NOTE - CURRENT MEDICATION
MEDICATIONS  (STANDING):  enoxaparin Injectable 40 milliGRAM(s) SubCutaneous daily  sodium chloride 0.9%. 1000 milliLiter(s) (75 mL/Hr) IV Continuous <Continuous>    MEDICATIONS  (PRN):

## 2021-06-07 NOTE — SWALLOW VFSS/MBS ASSESSMENT ADULT - PHARYNGEAL PHASE COMMENTS
Adequate initiation of pharyngeal swallow, adequate laryngeal elevation, reduced base of tongue retraction, adequate epiglottic deflection, adequate laryngeal vestibular closure and reduced pharyngeal contractility. Delay in initiation of pharyngeal swallow (head of bolus at level of valleculae), adequate laryngeal elevation, reduced base of tongue retraction, adequate epiglottic deflection, adequate laryngeal vestibular closure and reduced pharyngeal contractility Delay in initiation of pharyngeal swallow, reduced laryngeal elevation, reduced base of tongue retraction, reduced epiglottic deflection, reduced laryngeal vestibular closure and reduced pharyngeal contractility.

## 2021-06-07 NOTE — BH CONSULTATION LIAISON ASSESSMENT NOTE - NSBHCHARTREVIEWVS_PSY_A_CORE FT
Vital Signs Last 24 Hrs  T(C): 36.3 (07 Jun 2021 12:25), Max: 37.7 (06 Jun 2021 16:00)  T(F): 97.3 (07 Jun 2021 12:25), Max: 99.9 (06 Jun 2021 16:00)  HR: 89 (07 Jun 2021 12:25) (67 - 93)  BP: 140/83 (07 Jun 2021 12:25) (125/74 - 157/87)  BP(mean): 100 (07 Jun 2021 11:25) (82 - 100)  RR: 17 (07 Jun 2021 12:25) (14 - 21)  SpO2: 92% (07 Jun 2021 12:25) (92% - 100%)

## 2021-06-07 NOTE — CONSULT NOTE ADULT - PROBLEM SELECTOR RECOMMENDATION 3
Pt states he was previously on BP meds but none in the last year. Currently BP near goal of <140/90. Continue to monitor.

## 2021-06-07 NOTE — BH CONSULTATION LIAISON ASSESSMENT NOTE - HPI (INCLUDE ILLNESS QUALITY, SEVERITY, DURATION, TIMING, CONTEXT, MODIFYING FACTORS, ASSOCIATED SIGNS AND SYMPTOMS)
pt is a 59 y/o male with no previous psychiatric hx, PMHx HTN, hypothyroidism (does not take any medication at home), smoking (1 ppd x 46 years) and alcohol abuse (10 16oz beers x 19 years), PSH perforated cecum s/p hemicolectomy and end ileostomy in 2018 who was transferred from OSH for workup of a laryngeal mass. Pt initially noticed hoarse voice this past January. Pt presented to the OSH with SOB, difficulty breathing. From pt's records from OSH, CT neck showed bulky transglottic right sided mass, CT chest showed no intrathoracic mass. Pt was given solumedrol and Duoneb and pt was transferred for further management. SICU consulted for hyponatremia and CIWA watch, last reported drink 4 days prior to admission. Patient s/p trach 6/4. psychiatry initially consulted for anxiety/alcohol use.     no tremors, no diaphoresis   Pt was seen, pt had trach in place, pt was calm cooperative, alert. able to answers yes or no questions by gesturing with thumbs up or down or by writing down response. Denies acute depression, anxiety, or withdrawal currently. denies psychiatric hx. denies hx of withdrawal, legal issues , rehab or detox. He corroborates with 19 year drinking and smoking hx as stated above. Relates enjoying TV. Denies si/hi. denies a/v hallucinations.

## 2021-06-07 NOTE — PROGRESS NOTE ADULT - ASSESSMENT
60 y.o. male with right sided transglottic laryngeal mass, h/o ETOH abuse now s/p trach and biopsy 6/4. He was admitted to SICU for alcohol withdrawl concerns and placed on CIWA.    PLAN:   Neurologic: JAMMIE  -No longer on ativan    Respiratory:   - no respiratory distress, on trach collar  - Cuff deflated on 6/5 by ENT    Cardiovascular:   - no active issues    Gastrointestinal/Nutrition:   -Passed bedside swallow however given recent trach formal SLP evaluation recommended official cinesophogram to objectively assess the swallow mechanism.    Renal/Genitourinary:   -Monitoring hyponatremia on NS @ 75cc/hr  -Trend Na and other electrolytes  -strict Is and Os  -Q6 BMP    Hematologic:   - DVT ppx: Lovenox    Infectious Disease:  - no active issues    Endocrine:   -hx of hypothyroidism, states does not take synthroid at home.   -TSH wnl    Lines/Tubes: PIV    Disposition: SICU

## 2021-06-07 NOTE — PROGRESS NOTE ADULT - SUBJECTIVE AND OBJECTIVE BOX
ENT Progress Note    HPI: Patient is a 60y Male with unknown PMHx (?stomach cancer, s/p colostomy tube 2018) who presents as a transfer from OSH for workup of a laryngeal mass. Per pt has been having SOB on exertion and laying flat, dysphonia for the past 4 weeks and it is progressing. Pt states he has a heavy history of smoking (1 ppd x 46 years) and alcohol abuse (10 16 oz beers x 19 years). Pt denies fevers/weight loss/chills/SOB. States he does not think he has trouble eating/denies dysphagia.    Patient has R laryngeal mass likely subglottic extending to cricoid.    INTERVAL:    6/4: NAEON. Breathing comfortably on RA currently. Plan for OR today  6/5: sp trach yesterday  6/6 continues to be hyponatremic, undergoing w/u. SLP no penetration   6/7 Na uptrending     T(C): 36.7 (06-07-21 @ 00:00), Max: 37.7 (06-06-21 @ 16:00)  HR: 67 (06-07-21 @ 00:00) (67 - 110)  BP: 131/67 (06-07-21 @ 00:00) (106/77 - 148/91)  RR: 16 (06-07-21 @ 00:00) (14 - 21)  SpO2: 100% (06-07-21 @ 00:00) (98% - 100%)  on trach collar. trach 6LPC in place, sutured w trach ties secured. no bleeding around trach. trach cuff taken down  Neck: soft/flat, no LAD palpated.   CNII-XII intact    A/P:  60 p/w laryngeal mass. sp trach 6LPC  -hyponatremic. Na 120  -f/u urine labs / SIADH workup   - SLP: swallow study today  - F/u CTS on possible PEG during procedure  - CIWA protocol - SICU  Please perform standard tracheostomy care procedures.  - Regular suctioning with soft suction catheter q1  - Humidified air to tracheostomy tube  - We will change to cuffless trach POD5  - Call or page us if any issues

## 2021-06-07 NOTE — SWALLOW VFSS/MBS ASSESSMENT ADULT - DIAGNOSTIC IMPRESSIONS
Patient presents with Oropharyngeal Dysphagia marked by adequate stripping of bolus from utensil, adequate oral containment, extended mastication for solids with slow bolus manipulation Patient presents with Oropharyngeal Dysphagia. The Oral Phase was marked by adequate stripping of bolus from utensil, adequate oral containment, extended mastication for solids with slow bolus manipulation and delay in oral transit time. Piecemeal deglutition/transfer noted across trials with adequate oral clearance. The Pharyngeal Phase was marked by delay in initiation of pharyngeal swallow (head of bolus at level of valleculae), reduced laryngeal elevation, reduced base of tongue retraction, reduced epiglottic deflection, reduced laryngeal vestibular closure and reduced pharyngeal contractility. Reduced pharyngeal clearance marked by trace to mild residue along base of tongue surface, in valleculae and pyriforms post primary swallow. Patient benefits from liquid wash and intermittent spontaneous secondary re-swallow to assist with pharyngeal clearance. There was trace laryngeal penetration during the swallow which migrated to the level of the vocal cords after the swallow with subsequent Silent Aspiration for thin liquids. Reduced laryngeal sensation given no reflexive response to trace Aspiration. There was no laryngeal penetration/aspiration observed before, during or after the swallow for puree consistency, solids, honey thick liquids and nectar thick liquids.

## 2021-06-07 NOTE — SWALLOW VFSS/MBS ASSESSMENT ADULT - RECOMMENDED FEEDING/EATING TECHNIQUES
alternate food with liquid/maintain upright posture during/after eating for 30 mins/oral hygiene/position upright (90 degrees)/provide rest periods between swallows/small sips/bites

## 2021-06-08 ENCOUNTER — TRANSCRIPTION ENCOUNTER (OUTPATIENT)
Age: 61
End: 2021-06-08

## 2021-06-08 LAB
ANION GAP SERPL CALC-SCNC: 12 MMOL/L — SIGNIFICANT CHANGE UP (ref 7–14)
BUN SERPL-MCNC: 7 MG/DL — SIGNIFICANT CHANGE UP (ref 7–23)
CALCIUM SERPL-MCNC: 9.1 MG/DL — SIGNIFICANT CHANGE UP (ref 8.4–10.5)
CHLORIDE SERPL-SCNC: 94 MMOL/L — LOW (ref 98–107)
CO2 SERPL-SCNC: 22 MMOL/L — SIGNIFICANT CHANGE UP (ref 22–31)
CREAT SERPL-MCNC: 0.69 MG/DL — SIGNIFICANT CHANGE UP (ref 0.5–1.3)
GLUCOSE SERPL-MCNC: 92 MG/DL — SIGNIFICANT CHANGE UP (ref 70–99)
MAGNESIUM SERPL-MCNC: 1.7 MG/DL — SIGNIFICANT CHANGE UP (ref 1.6–2.6)
OSMOLALITY SERPL: 264 MOSM/KG — LOW (ref 275–295)
PHOSPHATE SERPL-MCNC: 2.8 MG/DL — SIGNIFICANT CHANGE UP (ref 2.5–4.5)
POTASSIUM SERPL-MCNC: 4.6 MMOL/L — SIGNIFICANT CHANGE UP (ref 3.5–5.3)
POTASSIUM SERPL-SCNC: 4.6 MMOL/L — SIGNIFICANT CHANGE UP (ref 3.5–5.3)
SODIUM SERPL-SCNC: 128 MMOL/L — LOW (ref 135–145)
T3 SERPL-MCNC: 68 NG/DL — LOW (ref 80–200)
T4 AB SER-ACNC: 6.16 UG/DL — SIGNIFICANT CHANGE UP (ref 5.1–13)
TSH SERPL-MCNC: 0.81 UIU/ML — SIGNIFICANT CHANGE UP (ref 0.27–4.2)

## 2021-06-08 PROCEDURE — 99233 SBSQ HOSP IP/OBS HIGH 50: CPT

## 2021-06-08 RX ADMIN — ENOXAPARIN SODIUM 40 MILLIGRAM(S): 100 INJECTION SUBCUTANEOUS at 11:48

## 2021-06-08 NOTE — DISCHARGE NOTE PROVIDER - NSDCMRMEDTOKEN_GEN_ALL_CORE_FT
acetaminophen 160 mg/5 mL oral suspension: 20.31 milliliter(s) orally every 6 hours, As needed, Mild Pain (1 - 3)  aspirin 81 mg oral tablet, chewable: 1 tab(s) orally once a day  bacitracin 500 units/g topical ointment: 1 application topically 2 times a day  senna oral tablet: 2 tab(s) orally once a day (at bedtime)   acetaminophen 160 mg/5 mL oral suspension: 20.31 milliliter(s) orally every 6 hours, As needed, Mild Pain (1 - 3)  aspirin 81 mg oral tablet, chewable: 1 tab(s) orally once a day  bacitracin 500 units/g topical ointment: 1 application topically 2 times a day  oxyCODONE 5 mg/5 mL oral solution: 5 milliliter(s) orally every 4 hours, As needed, Moderate and Severe Pain MDD:4  senna oral tablet: 2 tab(s) orally once a day (at bedtime)

## 2021-06-08 NOTE — PROGRESS NOTE ADULT - SUBJECTIVE AND OBJECTIVE BOX
Patient seen and examined, no acute events overnight. Tx to floor. Trach changed    T(C): 36.4 (06-08-21 @ 05:00), Max: 36.8 (06-07-21 @ 17:50)  HR: 88 (06-08-21 @ 05:00) (68 - 89)  BP: 130/88 (06-08-21 @ 05:00) (126/69 - 157/87)  RR: 18 (06-08-21 @ 05:00) (14 - 19)  SpO2: 100% (06-08-21 @ 05:00) (92% - 100%)  Well appearing, NAD  Breathing comfortably on RA, no stridor, no stertor  NC clear, 6CFS in place, soft collar  Minimal secretions  Neck otherwise soft/flat    A/P: 60 p/w laryngeal mass with resp distress, s/p trach.   - f/u hyponatremia  - f/u hospitalist reccs  - dysphagia diet  - fluid restriction per hospitalist  - SIADH workup  - PT  - SW for trach supplies  - pain control  - bowel regimen  - discussed with attending    Please perform standard tracheostomy care procedures.  - Regular suctioning with soft suction catheter q1  - Humidified air to tracheostomy tube  - Call or page us if any issues

## 2021-06-08 NOTE — PROGRESS NOTE ADULT - SUBJECTIVE AND OBJECTIVE BOX
Patient is a 60y old  Male who presents with a chief complaint of right laryngeal mass with SOB (07 Jun 2021 12:57)      SUBJECTIVE / OVERNIGHT EVENTS: No overnight event. No complaint.    MEDICATIONS  (STANDING):  enoxaparin Injectable 40 milliGRAM(s) SubCutaneous daily  nicotine -   7 mG/24Hr(s) Patch 1 patch Transdermal daily  polyethylene glycol 3350 17 Gram(s) Oral daily  senna 2 Tablet(s) Oral at bedtime    MEDICATIONS  (PRN):  acetaminophen   Tablet .. 650 milliGRAM(s) Oral every 6 hours PRN Mild Pain (1 - 3)  oxyCODONE    IR 5 milliGRAM(s) Oral every 6 hours PRN Moderate Pain (4 - 6)  oxyCODONE    IR 10 milliGRAM(s) Oral every 6 hours PRN Severe Pain (7 - 10)      CAPILLARY BLOOD GLUCOSE        I&O's Summary    07 Jun 2021 07:01  -  08 Jun 2021 07:00  --------------------------------------------------------  IN: 1275 mL / OUT: 1700 mL / NET: -425 mL    08 Jun 2021 07:01  -  08 Jun 2021 11:09  --------------------------------------------------------  IN: 120 mL / OUT: 600 mL / NET: -480 mL        PHYSICAL EXAM:  Vital Signs Last 24 Hrs  T(C): 36.9 (08 Jun 2021 10:18), Max: 36.9 (08 Jun 2021 10:18)  T(F): 98.4 (08 Jun 2021 10:18), Max: 98.4 (08 Jun 2021 10:18)  HR: 81 (08 Jun 2021 10:18) (72 - 89)  BP: 106/75 (08 Jun 2021 10:18) (106/75 - 157/87)  BP(mean): 100 (07 Jun 2021 11:25) (100 - 100)  RR: 18 (08 Jun 2021 10:18) (16 - 19)  SpO2: 97% (08 Jun 2021 10:18) (92% - 100%)  CONSTITUTIONAL: NAD, well-developed, well-groomed  EYES: PERRLA; conjunctiva and sclera clear  ENMT: Moist oral mucosa, no pharyngeal injection or exudates; normal dentition  NECK: trach site c/d/i  RESPIRATORY: Normal respiratory effort; lungs are clear to auscultation bilaterally  CARDIOVASCULAR: Regular rate and rhythm, normal S1 and S2, no murmur/rub/gallop; No lower extremity edema; Peripheral pulses are 2+ bilaterally  ABDOMEN: Nontender to palpation, normoactive bowel sounds, no rebound/guarding; No hepatosplenomegaly    LABS:                        14.6   6.78  )-----------( 98       ( 07 Jun 2021 00:45 )             41.5     06-08    128<L>  |  94<L>  |  7   ----------------------------<  92  4.6   |  22  |  0.69    Ca    9.1      08 Jun 2021 07:31  Phos  2.8     06-08  Mg     1.7     06-08                  RADIOLOGY & ADDITIONAL TESTS:  Results Reviewed:   Imaging Personally Reviewed:  Electrocardiogram Personally Reviewed:    COORDINATION OF CARE:  Care Discussed with Consultants/Other Providers [Y/N]:  Prior or Outpatient Records Reviewed [Y/N]:

## 2021-06-08 NOTE — DISCHARGE NOTE PROVIDER - NSDCCPCAREPLAN_GEN_ALL_CORE_FT
PRINCIPAL DISCHARGE DIAGNOSIS  Diagnosis: Laryngeal cancer  Assessment and Plan of Treatment:

## 2021-06-08 NOTE — DISCHARGE NOTE PROVIDER - NSDCFUADDINST_GEN_ALL_CORE_FT
Laryngectomy soft size 10: clean daily as taught at [facility]. Suction as needed. Humidified air to trach collar.   Laryngectomy soft size 8: clean daily as taught at [facility]. Suction as needed. Humidified air to trach collar.

## 2021-06-08 NOTE — PROGRESS NOTE ADULT - ASSESSMENT
60M with history of HTN, hypothyroidism, tobacco and EtOH dependence, who presents as transfer for work-up right laryngeal mass. Recent history of hoarse voice since 1/2021, now with progressive SOB, found to have right laryngeal mass now s/p biopsy and trach.

## 2021-06-08 NOTE — DISCHARGE NOTE PROVIDER - HOSPITAL COURSE
S/P Tracheostomy tube for laryngeal mass. Patient had a swallow study and was started on dysphagia diet. Taught trach care and cleared for discharge home on... S/P Tracheostomy tube for laryngeal mass. Patient had a swallow study and was started on dysphagia diet. Taught trach care and cleared for discharge home on 6/21/21. S/P Tracheostomy tube for laryngeal mass. Patient had a swallow study and was started on dysphagia diet. Taught trach care and cleared for discharge home on 6/23/21. S/P Tracheostomy tube for laryngeal mass. presented at Tumor board and a total laryngectomy was recommended. S/P TL, L ALT, TEP and neck dissection. Observed in ICU then transferred to [address] [place] [address]. Patient had a swallow study and was started on oral diet. . Taught laryngectomy care and cleared for discharge home on 6/24/21.

## 2021-06-08 NOTE — DISCHARGE NOTE PROVIDER - CARE PROVIDER_API CALL
Jayy Keys)  Otolaryngology  [address] [place] [address]  Phone: [phone]  Fax: [phone]  Follow Up Time:

## 2021-06-08 NOTE — SWALLOW BEDSIDE ASSESSMENT ADULT - COMMENTS
As pe rH&P: 60M with history of HTN, hypothyroidism, tobacco and EtOH dependence, who presents as transfer for work-up right laryngeal mass. Recent history of hoarse voice since 1/2021, now with progressive SOB, found to have right laryngeal mass now s/p biopsy and trach.    SLP received order for evaluation to provide total laryngectomy education, awaiting clearance from ENT service. Will follow up with ENT PA tomorrow (6/9) as requested.
As per SICU Note: HPI: 60 y.o. 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 Pt initially noticed hoarse voice this past January. Pt presented to the OSH with SOB, difficulty breathing. From pt's records from OSH, CT neck showed bulky transglottic right sided mass, CT chest showed no intrathoracic mass. Pt was given solumedrol and duonebs and pt was transferred for further management. SICU consulted for [employer] Gu[employer] watch.    As per ENT Note: 60M unknown PMHx p/w laryngeal mass. Based on CT scan and scope exam, will likely need OR intervention for further workup. Laryngoscopy Findings: NP wnl, BOT/vallecula normal, Epiglottis sharp, AE folds nonedematous, Arytenoids mobile, R TVC immobile, Unable to fully visualize mass on exam, No masses or lesions visualized in post cricoid space or pyriform sinuses bilaterally    SLP received order for clinical swallow evaluation, reviewed chart and called ENT service, spoke with MD Rondon who requested this service defer swallow evaluation at this time as patient is NPO for OR today, and will reconsult this service as indicated.
ENT Note 6/5/21: 60 p/w laryngeal mass. sp trach 6LPC    SICU Note 6/6/21: 60 y.o. male with right sided transglottic laryngeal mass, h/o ETOH abuse now s/p trach and biopsy 6/4. He was admitted to SICU for alcohol withdrawl concerns and placed on CIWA.    Patient was seen upright at bedside with #6 LPC (Cuff Deflated) Tracheostomy in place on Trach Collar Status. Patient was alert/awake and responsive via mouth wording/ written communication. Patient able to follow simple directions. Aphonia noted with and without digital occlusion. Wet vocalizations noted prior to PO trials. BASELINE STATS: SpO2 100%; RR 18

## 2021-06-08 NOTE — DISCHARGE NOTE PROVIDER - CARE PROVIDERS DIRECT ADDRESSES
[email or web address]

## 2021-06-09 LAB
ANION GAP SERPL CALC-SCNC: 13 MMOL/L — SIGNIFICANT CHANGE UP (ref 7–14)
BUN SERPL-MCNC: 7 MG/DL — SIGNIFICANT CHANGE UP (ref 7–23)
CALCIUM SERPL-MCNC: 8.9 MG/DL — SIGNIFICANT CHANGE UP (ref 8.4–10.5)
CHLORIDE SERPL-SCNC: 94 MMOL/L — LOW (ref 98–107)
CO2 SERPL-SCNC: 20 MMOL/L — LOW (ref 22–31)
CREAT SERPL-MCNC: 0.71 MG/DL — SIGNIFICANT CHANGE UP (ref 0.5–1.3)
GLUCOSE SERPL-MCNC: 84 MG/DL — SIGNIFICANT CHANGE UP (ref 70–99)
HCT VFR BLD CALC: 41 % — SIGNIFICANT CHANGE UP (ref 39–50)
HGB BLD-MCNC: 14.5 G/DL — SIGNIFICANT CHANGE UP (ref 13–17)
MAGNESIUM SERPL-MCNC: 1.5 MG/DL — LOW (ref 1.6–2.6)
MCHC RBC-ENTMCNC: 34.4 PG — HIGH (ref 27–34)
MCHC RBC-ENTMCNC: 35.4 GM/DL — SIGNIFICANT CHANGE UP (ref 32–36)
MCV RBC AUTO: 97.4 FL — SIGNIFICANT CHANGE UP (ref 80–100)
NRBC # BLD: 0 /100 WBCS — SIGNIFICANT CHANGE UP
NRBC # FLD: 0 K/UL — SIGNIFICANT CHANGE UP
PHOSPHATE SERPL-MCNC: 3.5 MG/DL — SIGNIFICANT CHANGE UP (ref 2.5–4.5)
PLATELET # BLD AUTO: 137 K/UL — LOW (ref 150–400)
POTASSIUM SERPL-MCNC: 3.7 MMOL/L — SIGNIFICANT CHANGE UP (ref 3.5–5.3)
POTASSIUM SERPL-SCNC: 3.7 MMOL/L — SIGNIFICANT CHANGE UP (ref 3.5–5.3)
RBC # BLD: 4.21 M/UL — SIGNIFICANT CHANGE UP (ref 4.2–5.8)
RBC # FLD: 11.9 % — SIGNIFICANT CHANGE UP (ref 10.3–14.5)
SODIUM SERPL-SCNC: 127 MMOL/L — LOW (ref 135–145)
WBC # BLD: 5.2 K/UL — SIGNIFICANT CHANGE UP (ref 3.8–10.5)
WBC # FLD AUTO: 5.2 K/UL — SIGNIFICANT CHANGE UP (ref 3.8–10.5)

## 2021-06-09 PROCEDURE — 70491 CT SOFT TISSUE NECK W/DYE: CPT | Mod: 26

## 2021-06-09 PROCEDURE — 76705 ECHO EXAM OF ABDOMEN: CPT | Mod: 26

## 2021-06-09 PROCEDURE — 99233 SBSQ HOSP IP/OBS HIGH 50: CPT

## 2021-06-09 PROCEDURE — 71260 CT THORAX DX C+: CPT | Mod: 26

## 2021-06-09 RX ORDER — MAGNESIUM SULFATE 500 MG/ML
1 VIAL (ML) INJECTION ONCE
Refills: 0 | Status: COMPLETED | OUTPATIENT
Start: 2021-06-09 | End: 2021-06-09

## 2021-06-09 RX ADMIN — ENOXAPARIN SODIUM 40 MILLIGRAM(S): 100 INJECTION SUBCUTANEOUS at 11:34

## 2021-06-09 RX ADMIN — Medication 100 GRAM(S): at 11:31

## 2021-06-09 NOTE — SPEECH LANGUAGE PATHOLOGY EVALUATION - COMMENTS
As per Hospitalist Note: 60M with history of HTN, hypothyroidism, tobacco and EtOH dependence, who presents as transfer for work-up right laryngeal mass. Recent history of hoarse voice since 1/2021, now with progressive SOB, found to have right laryngeal mass now s/p biopsy and trach.    As per ENT Note: A/P: 60 p/w laryngeal mass with resp distress, s/p trach.     Patient seen by this service on 6/6/21 for a clinical swallow evaluation and on 6/7/21 for a cinesophagram, please refer to reports for full details.      According to chart, patient is now tentatively scheduled to have a total laryngectomy next week.      Patient was seen upright at bedside with #6 LPC (Cuff Deflated) Tracheostomy in place on Trach Collar Status. Patient was alert/awake and responsive via mouth wording/ written communication. Patient able to follow simple directions. Aphonia noted with and without digital occlusion. RECOMMENDATIONS:   Follow up at the [facility] for instruction/training for all alternate method of communication.  Reconsult this service as indicated.

## 2021-06-09 NOTE — PROGRESS NOTE ADULT - SUBJECTIVE AND OBJECTIVE BOX
Patient is a 60y old  Male who presents with a chief complaint of right laryngeal mass with SOB (07 Jun 2021 12:57)      SUBJECTIVE / OVERNIGHT EVENTS: No event. Pt well without complaint.    MEDICATIONS  (STANDING):  enoxaparin Injectable 40 milliGRAM(s) SubCutaneous daily  magnesium sulfate  IVPB 1 Gram(s) IV Intermittent once  nicotine -   7 mG/24Hr(s) Patch 1 patch Transdermal daily  polyethylene glycol 3350 17 Gram(s) Oral daily  senna 2 Tablet(s) Oral at bedtime    MEDICATIONS  (PRN):  acetaminophen   Tablet .. 650 milliGRAM(s) Oral every 6 hours PRN Mild Pain (1 - 3)  oxyCODONE    IR 5 milliGRAM(s) Oral every 6 hours PRN Moderate Pain (4 - 6)  oxyCODONE    IR 10 milliGRAM(s) Oral every 6 hours PRN Severe Pain (7 - 10)      CAPILLARY BLOOD GLUCOSE        I&O's Summary    08 Jun 2021 07:01  -  09 Jun 2021 07:00  --------------------------------------------------------  IN: 480 mL / OUT: 2240 mL / NET: -1760 mL        PHYSICAL EXAM:  Vital Signs Last 24 Hrs  T(C): 36.8 (09 Jun 2021 09:36), Max: 37.1 (08 Jun 2021 14:20)  T(F): 98.2 (09 Jun 2021 09:36), Max: 98.8 (08 Jun 2021 14:20)  HR: 92 (09 Jun 2021 09:36) (71 - 92)  BP: 127/80 (09 Jun 2021 09:36) (126/77 - 151/94)  BP(mean): --  RR: 18 (09 Jun 2021 09:36) (18 - 19)  SpO2: 100% (09 Jun 2021 09:36) (98% - 100%)  CONSTITUTIONAL: NAD, well-developed, well-groomed  EYES: PERRLA; conjunctiva and sclera clear  ENMT: Moist oral mucosa, no pharyngeal injection or exudates; normal dentition  NECK: trach c/d/i  RESPIRATORY: Normal respiratory effort; lungs are clear to auscultation bilaterally  CARDIOVASCULAR: Regular rate and rhythm, normal S1 and S2, no murmur/rub/gallop; No lower extremity edema; Peripheral pulses are 2+ bilaterally  ABDOMEN: Nontender to palpation, normoactive bowel sounds, no rebound/guarding; No hepatosplenomegaly    LABS:                        14.5   5.20  )-----------( 137      ( 09 Jun 2021 07:10 )             41.0     06-09    127<L>  |  94<L>  |  7   ----------------------------<  84  3.7   |  20<L>  |  0.71    Ca    8.9      09 Jun 2021 07:10  Phos  3.5     06-09  Mg     1.5     06-09                  RADIOLOGY & ADDITIONAL TESTS:  Results Reviewed:   Imaging Personally Reviewed:  Electrocardiogram Personally Reviewed:    COORDINATION OF CARE:  Care Discussed with Consultants/Other Providers [Y/N]:  Prior or Outpatient Records Reviewed [Y/N]:

## 2021-06-09 NOTE — PROGRESS NOTE ADULT - SUBJECTIVE AND OBJECTIVE BOX
Patient seen and examined, no acute events overnight. Trach changed. fup CTs.    Vital Signs Last 24 Hrs  T(C): 36.8 (09 Jun 2021 06:00), Max: 37.1 (08 Jun 2021 14:20)  T(F): 98.2 (09 Jun 2021 06:00), Max: 98.8 (08 Jun 2021 14:20)  HR: 71 (09 Jun 2021 06:00) (71 - 87)  BP: 139/89 (09 Jun 2021 06:00) (106/75 - 151/94)  BP(mean): --  RR: 18 (09 Jun 2021 06:00) (18 - 19)  SpO2: 98% (09 Jun 2021 06:00) (97% - 100%)    Well appearing, NAD  Breathing comfortably on RA, no stridor, no stertor  NC clear, 6CFS in place, soft collar  Minimal secretions  Neck otherwise soft/flat    A/P: 60 p/w laryngeal mass with resp distress, s/p trach.   - CT scans  - f/u hyponatremia  - f/u hospitalist reccs  - dysphagia diet  - fluid restriction per hospitalist  - SIADH workup  - PT  - SW for trach supplies  - pain control  - bowel regimen  - discussed with attending    Please perform standard tracheostomy care procedures.  - Regular suctioning with soft suction catheter q1  - Humidified air to tracheostomy tube  - Call or page us if any issues

## 2021-06-10 ENCOUNTER — TRANSCRIPTION ENCOUNTER (OUTPATIENT)
Age: 61
End: 2021-06-10

## 2021-06-10 LAB
ANION GAP SERPL CALC-SCNC: 10 MMOL/L — SIGNIFICANT CHANGE UP (ref 7–14)
BLD GP AB SCN SERPL QL: NEGATIVE — SIGNIFICANT CHANGE UP
BUN SERPL-MCNC: 8 MG/DL — SIGNIFICANT CHANGE UP (ref 7–23)
CALCIUM SERPL-MCNC: 9.3 MG/DL — SIGNIFICANT CHANGE UP (ref 8.4–10.5)
CHLORIDE SERPL-SCNC: 93 MMOL/L — LOW (ref 98–107)
CO2 SERPL-SCNC: 24 MMOL/L — SIGNIFICANT CHANGE UP (ref 22–31)
CREAT SERPL-MCNC: 0.73 MG/DL — SIGNIFICANT CHANGE UP (ref 0.5–1.3)
GLUCOSE SERPL-MCNC: 86 MG/DL — SIGNIFICANT CHANGE UP (ref 70–99)
HCT VFR BLD CALC: 41.3 % — SIGNIFICANT CHANGE UP (ref 39–50)
HGB BLD-MCNC: 15 G/DL — SIGNIFICANT CHANGE UP (ref 13–17)
MAGNESIUM SERPL-MCNC: 1.5 MG/DL — LOW (ref 1.6–2.6)
MCHC RBC-ENTMCNC: 34.2 PG — HIGH (ref 27–34)
MCHC RBC-ENTMCNC: 36.3 GM/DL — HIGH (ref 32–36)
MCV RBC AUTO: 94.1 FL — SIGNIFICANT CHANGE UP (ref 80–100)
NRBC # BLD: 0 /100 WBCS — SIGNIFICANT CHANGE UP
NRBC # FLD: 0 K/UL — SIGNIFICANT CHANGE UP
PHOSPHATE SERPL-MCNC: 3.4 MG/DL — SIGNIFICANT CHANGE UP (ref 2.5–4.5)
PLATELET # BLD AUTO: 146 K/UL — LOW (ref 150–400)
POTASSIUM SERPL-MCNC: 4 MMOL/L — SIGNIFICANT CHANGE UP (ref 3.5–5.3)
POTASSIUM SERPL-SCNC: 4 MMOL/L — SIGNIFICANT CHANGE UP (ref 3.5–5.3)
RBC # BLD: 4.39 M/UL — SIGNIFICANT CHANGE UP (ref 4.2–5.8)
RBC # FLD: 11.8 % — SIGNIFICANT CHANGE UP (ref 10.3–14.5)
RH IG SCN BLD-IMP: POSITIVE — SIGNIFICANT CHANGE UP
SARS-COV-2 RNA SPEC QL NAA+PROBE: SIGNIFICANT CHANGE UP
SODIUM SERPL-SCNC: 127 MMOL/L — LOW (ref 135–145)
SURGICAL PATHOLOGY STUDY: SIGNIFICANT CHANGE UP
WBC # BLD: 5.14 K/UL — SIGNIFICANT CHANGE UP (ref 3.8–10.5)
WBC # FLD AUTO: 5.14 K/UL — SIGNIFICANT CHANGE UP (ref 3.8–10.5)

## 2021-06-10 PROCEDURE — 99233 SBSQ HOSP IP/OBS HIGH 50: CPT

## 2021-06-10 RX ORDER — MAGNESIUM SULFATE 500 MG/ML
1 VIAL (ML) INJECTION ONCE
Refills: 0 | Status: COMPLETED | OUTPATIENT
Start: 2021-06-10 | End: 2021-06-10

## 2021-06-10 RX ADMIN — ENOXAPARIN SODIUM 40 MILLIGRAM(S): 100 INJECTION SUBCUTANEOUS at 12:36

## 2021-06-10 RX ADMIN — POLYETHYLENE GLYCOL 3350 17 GRAM(S): 17 POWDER, FOR SOLUTION ORAL at 12:36

## 2021-06-10 RX ADMIN — Medication 100 GRAM(S): at 12:36

## 2021-06-10 NOTE — PROGRESS NOTE ADULT - SUBJECTIVE AND OBJECTIVE BOX
Patient is a 60y old  Male who presents with a chief complaint of right laryngeal mass with SOB (07 Jun 2021 12:57)      SUBJECTIVE / OVERNIGHT EVENTS: No overnight event. No complaint.    MEDICATIONS  (STANDING):  enoxaparin Injectable 40 milliGRAM(s) SubCutaneous daily  magnesium sulfate  IVPB 1 Gram(s) IV Intermittent once  nicotine -   7 mG/24Hr(s) Patch 1 patch Transdermal daily  polyethylene glycol 3350 17 Gram(s) Oral daily  senna 2 Tablet(s) Oral at bedtime    MEDICATIONS  (PRN):  acetaminophen   Tablet .. 650 milliGRAM(s) Oral every 6 hours PRN Mild Pain (1 - 3)  oxyCODONE    IR 5 milliGRAM(s) Oral every 6 hours PRN Moderate Pain (4 - 6)  oxyCODONE    IR 10 milliGRAM(s) Oral every 6 hours PRN Severe Pain (7 - 10)      CAPILLARY BLOOD GLUCOSE        I&O's Summary    09 Jun 2021 07:01  -  10 Isaac 2021 07:00  --------------------------------------------------------  IN: 710 mL / OUT: 3170 mL / NET: -2460 mL        PHYSICAL EXAM:  Vital Signs Last 24 Hrs  T(C): 37.1 (10 Isaac 2021 10:00), Max: 37.1 (10 Isaac 2021 10:00)  T(F): 98.7 (10 Isaac 2021 10:00), Max: 98.7 (10 Isaac 2021 10:00)  HR: 102 (10 Isaac 2021 10:00) (83 - 102)  BP: 114/88 (10 Isaac 2021 10:00) (114/88 - 153/91)  BP(mean): --  RR: 18 (10 Isaac 2021 10:00) (16 - 19)  SpO2: 98% (10 Isaac 2021 10:00) (98% - 100%)  CONSTITUTIONAL: NAD, well-developed, well-groomed  EYES: PERRLA; conjunctiva and sclera clear  ENMT: Moist oral mucosa, no pharyngeal injection or exudates; normal dentition  NECK: trach c/d/i  RESPIRATORY: Normal respiratory effort; lungs are clear to auscultation bilaterally  CARDIOVASCULAR: Regular rate and rhythm, normal S1 and S2, no murmur/rub/gallop; No lower extremity edema; Peripheral pulses are 2+ bilaterally  ABDOMEN: Nontender to palpation, normoactive bowel sounds, no rebound/guarding; No hepatosplenomegaly      LABS:                        15.0   5.14  )-----------( 146      ( 10 Isaac 2021 07:37 )             41.3     06-10    127<L>  |  93<L>  |  8   ----------------------------<  86  4.0   |  24  |  0.73    Ca    9.3      10 Isaac 2021 07:37  Phos  3.4     06-10  Mg     1.5     06-10                  RADIOLOGY & ADDITIONAL TESTS:  Results Reviewed:   Imaging Personally Reviewed:  Electrocardiogram Personally Reviewed:    COORDINATION OF CARE:  Care Discussed with Consultants/Other Providers [Y/N]:  Prior or Outpatient Records Reviewed [Y/N]:

## 2021-06-10 NOTE — PROGRESS NOTE ADULT - SUBJECTIVE AND OBJECTIVE BOX
Patient seen and examined, no acute events. Preop CTs done, SLP evaluated. OR tomorrow for LPectomy.     T(C): 36.9 (06-09-21 @ 21:49), Max: 36.9 (06-09-21 @ 21:49)  HR: 91 (06-09-21 @ 21:49) (71 - 92)  BP: 153/91 (06-09-21 @ 21:49) (125/68 - 153/91)  RR: 16 (06-09-21 @ 21:49) (16 - 19)  SpO2: 100% (06-09-21 @ 21:49) (98% - 100%)  Well appearing, NAD  Breathing comfortably on RA, no stridor, no stertor  NC clear, 6CFS in place, soft collar  Minimal secretions  Neck otherwise soft/flat    AM labs pending    A/P: 60 p/w laryngeal mass with resp distress, s/p trach.   - f/u hyponatremia  - f/u hospitalist reccs - SENA cleared with no further workup needed  - dysphagia diet  - fluid restriction per hospitalist  - PT  - NPO/IVF at [place]  - f/u labs  - pain control  - bowel regimen  - discussed with attending

## 2021-06-11 ENCOUNTER — RESULT REVIEW (OUTPATIENT)
Age: 61
End: 2021-06-11

## 2021-06-11 LAB
ALBUMIN SERPL ELPH-MCNC: 3.3 G/DL — SIGNIFICANT CHANGE UP (ref 3.3–5)
ALP SERPL-CCNC: 65 U/L — SIGNIFICANT CHANGE UP (ref 40–120)
ALT FLD-CCNC: 21 U/L — SIGNIFICANT CHANGE UP (ref 4–41)
ANION GAP SERPL CALC-SCNC: 10 MMOL/L — SIGNIFICANT CHANGE UP (ref 7–14)
ANION GAP SERPL CALC-SCNC: 11 MMOL/L — SIGNIFICANT CHANGE UP (ref 7–14)
AST SERPL-CCNC: 15 U/L — SIGNIFICANT CHANGE UP (ref 4–40)
BILIRUB DIRECT SERPL-MCNC: 0.3 MG/DL — HIGH (ref 0–0.2)
BILIRUB INDIRECT FLD-MCNC: 1.3 MG/DL — HIGH (ref 0–1)
BILIRUB SERPL-MCNC: 1.6 MG/DL — HIGH (ref 0.2–1.2)
BLOOD GAS ARTERIAL COMPREHENSIVE RESULT: SIGNIFICANT CHANGE UP
BUN SERPL-MCNC: 7 MG/DL — SIGNIFICANT CHANGE UP (ref 7–23)
BUN SERPL-MCNC: 9 MG/DL — SIGNIFICANT CHANGE UP (ref 7–23)
CA-I BLD-SCNC: 1.11 MMOL/L — LOW (ref 1.15–1.29)
CALCIUM SERPL-MCNC: 7.5 MG/DL — LOW (ref 8.4–10.5)
CALCIUM SERPL-MCNC: 7.5 MG/DL — LOW (ref 8.4–10.5)
CALCIUM SERPL-MCNC: 9.3 MG/DL — SIGNIFICANT CHANGE UP (ref 8.4–10.5)
CHLORIDE SERPL-SCNC: 94 MMOL/L — LOW (ref 98–107)
CHLORIDE SERPL-SCNC: 99 MMOL/L — SIGNIFICANT CHANGE UP (ref 98–107)
CO2 SERPL-SCNC: 21 MMOL/L — LOW (ref 22–31)
CO2 SERPL-SCNC: 23 MMOL/L — SIGNIFICANT CHANGE UP (ref 22–31)
CREAT SERPL-MCNC: 0.63 MG/DL — SIGNIFICANT CHANGE UP (ref 0.5–1.3)
CREAT SERPL-MCNC: 0.75 MG/DL — SIGNIFICANT CHANGE UP (ref 0.5–1.3)
GAS PNL BLDA: SIGNIFICANT CHANGE UP
GAS PNL BLDA: SIGNIFICANT CHANGE UP
GLUCOSE SERPL-MCNC: 133 MG/DL — HIGH (ref 70–99)
GLUCOSE SERPL-MCNC: 83 MG/DL — SIGNIFICANT CHANGE UP (ref 70–99)
HCT VFR BLD CALC: 29.3 % — LOW (ref 39–50)
HCT VFR BLD CALC: 45.5 % — SIGNIFICANT CHANGE UP (ref 39–50)
HGB BLD-MCNC: 10.3 G/DL — LOW (ref 13–17)
HGB BLD-MCNC: 15.4 G/DL — SIGNIFICANT CHANGE UP (ref 13–17)
INR BLD: 0.98 RATIO — SIGNIFICANT CHANGE UP (ref 0.88–1.16)
MAGNESIUM SERPL-MCNC: 1.2 MG/DL — LOW (ref 1.6–2.6)
MAGNESIUM SERPL-MCNC: 1.8 MG/DL — SIGNIFICANT CHANGE UP (ref 1.6–2.6)
MCHC RBC-ENTMCNC: 33.4 PG — SIGNIFICANT CHANGE UP (ref 27–34)
MCHC RBC-ENTMCNC: 33.8 GM/DL — SIGNIFICANT CHANGE UP (ref 32–36)
MCHC RBC-ENTMCNC: 34.7 PG — HIGH (ref 27–34)
MCHC RBC-ENTMCNC: 35.2 GM/DL — SIGNIFICANT CHANGE UP (ref 32–36)
MCV RBC AUTO: 98.7 FL — SIGNIFICANT CHANGE UP (ref 80–100)
MCV RBC AUTO: 98.7 FL — SIGNIFICANT CHANGE UP (ref 80–100)
NRBC # BLD: 0 /100 WBCS — SIGNIFICANT CHANGE UP
NRBC # BLD: 0 /100 WBCS — SIGNIFICANT CHANGE UP
NRBC # FLD: 0 K/UL — SIGNIFICANT CHANGE UP
NRBC # FLD: 0 K/UL — SIGNIFICANT CHANGE UP
PHOSPHATE SERPL-MCNC: 2.9 MG/DL — SIGNIFICANT CHANGE UP (ref 2.5–4.5)
PHOSPHATE SERPL-MCNC: 3.2 MG/DL — SIGNIFICANT CHANGE UP (ref 2.5–4.5)
PLATELET # BLD AUTO: 135 K/UL — LOW (ref 150–400)
PLATELET # BLD AUTO: 166 K/UL — SIGNIFICANT CHANGE UP (ref 150–400)
POTASSIUM SERPL-MCNC: 3.5 MMOL/L — SIGNIFICANT CHANGE UP (ref 3.5–5.3)
POTASSIUM SERPL-MCNC: 4.6 MMOL/L — SIGNIFICANT CHANGE UP (ref 3.5–5.3)
POTASSIUM SERPL-SCNC: 3.5 MMOL/L — SIGNIFICANT CHANGE UP (ref 3.5–5.3)
POTASSIUM SERPL-SCNC: 4.6 MMOL/L — SIGNIFICANT CHANGE UP (ref 3.5–5.3)
PROT SERPL-MCNC: 6.7 G/DL — SIGNIFICANT CHANGE UP (ref 6–8.3)
PROTHROM AB SERPL-ACNC: 11.3 SEC — SIGNIFICANT CHANGE UP (ref 10.6–13.6)
PTH-INTACT FLD-MCNC: 23 PG/ML — SIGNIFICANT CHANGE UP (ref 15–65)
RBC # BLD: 2.97 M/UL — LOW (ref 4.2–5.8)
RBC # BLD: 4.61 M/UL — SIGNIFICANT CHANGE UP (ref 4.2–5.8)
RBC # FLD: 11.7 % — SIGNIFICANT CHANGE UP (ref 10.3–14.5)
RBC # FLD: 11.8 % — SIGNIFICANT CHANGE UP (ref 10.3–14.5)
SODIUM SERPL-SCNC: 128 MMOL/L — LOW (ref 135–145)
SODIUM SERPL-SCNC: 130 MMOL/L — LOW (ref 135–145)
WBC # BLD: 5.25 K/UL — SIGNIFICANT CHANGE UP (ref 3.8–10.5)
WBC # BLD: 6.87 K/UL — SIGNIFICANT CHANGE UP (ref 3.8–10.5)
WBC # FLD AUTO: 5.25 K/UL — SIGNIFICANT CHANGE UP (ref 3.8–10.5)
WBC # FLD AUTO: 6.87 K/UL — SIGNIFICANT CHANGE UP (ref 3.8–10.5)

## 2021-06-11 PROCEDURE — 88331 PATH CONSLTJ SURG 1 BLK 1SPC: CPT | Mod: 26

## 2021-06-11 PROCEDURE — 88309 TISSUE EXAM BY PATHOLOGIST: CPT | Mod: 26

## 2021-06-11 PROCEDURE — 88307 TISSUE EXAM BY PATHOLOGIST: CPT | Mod: 26

## 2021-06-11 PROCEDURE — 38724 REMOVAL OF LYMPH NODES NECK: CPT | Mod: 50,GC,59

## 2021-06-11 PROCEDURE — 60220 PARTIAL REMOVAL OF THYROID: CPT | Mod: GC,RT

## 2021-06-11 PROCEDURE — 31611 CONSTJ TRACHESOPHGL FSTL: CPT | Mod: GC

## 2021-06-11 PROCEDURE — 42950 RECONSTRUCTION OF THROAT: CPT | Mod: GC

## 2021-06-11 PROCEDURE — 15757 FREE SKIN FLAP MICROVASC: CPT | Mod: GC

## 2021-06-11 PROCEDURE — 99233 SBSQ HOSP IP/OBS HIGH 50: CPT

## 2021-06-11 PROCEDURE — 71045 X-RAY EXAM CHEST 1 VIEW: CPT | Mod: 26

## 2021-06-11 PROCEDURE — 31360 REMOVAL OF LARYNX: CPT | Mod: GC

## 2021-06-11 PROCEDURE — 88332 PATH CONSLTJ SURG EA ADD BLK: CPT | Mod: 26

## 2021-06-11 PROCEDURE — 88311 DECALCIFY TISSUE: CPT | Mod: 26

## 2021-06-11 RX ORDER — CALCIUM GLUCONATE 100 MG/ML
2 VIAL (ML) INTRAVENOUS ONCE
Refills: 0 | Status: COMPLETED | OUTPATIENT
Start: 2021-06-11 | End: 2021-06-12

## 2021-06-11 RX ORDER — ASPIRIN/CALCIUM CARB/MAGNESIUM 324 MG
300 TABLET ORAL ONCE
Refills: 0 | Status: COMPLETED | OUTPATIENT
Start: 2021-06-11 | End: 2021-06-11

## 2021-06-11 RX ORDER — ALBUMIN HUMAN 25 %
250 VIAL (ML) INTRAVENOUS ONCE
Refills: 0 | Status: COMPLETED | OUTPATIENT
Start: 2021-06-11 | End: 2021-06-11

## 2021-06-11 RX ORDER — AMPICILLIN SODIUM AND SULBACTAM SODIUM 250; 125 MG/ML; MG/ML
1.5 INJECTION, POWDER, FOR SUSPENSION INTRAMUSCULAR; INTRAVENOUS EVERY 6 HOURS
Refills: 0 | Status: DISCONTINUED | OUTPATIENT
Start: 2021-06-11 | End: 2021-06-19

## 2021-06-11 RX ORDER — SODIUM CHLORIDE 9 MG/ML
500 INJECTION, SOLUTION INTRAVENOUS ONCE
Refills: 0 | Status: COMPLETED | OUTPATIENT
Start: 2021-06-11 | End: 2021-06-11

## 2021-06-11 RX ORDER — AMPICILLIN SODIUM AND SULBACTAM SODIUM 250; 125 MG/ML; MG/ML
1.5 INJECTION, POWDER, FOR SUSPENSION INTRAMUSCULAR; INTRAVENOUS ONCE
Refills: 0 | Status: DISCONTINUED | OUTPATIENT
Start: 2021-06-11 | End: 2021-06-11

## 2021-06-11 RX ORDER — ASPIRIN/CALCIUM CARB/MAGNESIUM 324 MG
81 TABLET ORAL DAILY
Refills: 0 | Status: DISCONTINUED | OUTPATIENT
Start: 2021-06-12 | End: 2021-06-24

## 2021-06-11 RX ORDER — SODIUM CHLORIDE 9 MG/ML
500 INJECTION, SOLUTION INTRAVENOUS
Refills: 0 | Status: DISCONTINUED | OUTPATIENT
Start: 2021-06-11 | End: 2021-06-12

## 2021-06-11 RX ORDER — MAGNESIUM SULFATE 500 MG/ML
2 VIAL (ML) INJECTION
Refills: 0 | Status: COMPLETED | OUTPATIENT
Start: 2021-06-11 | End: 2021-06-11

## 2021-06-11 RX ORDER — ACETAMINOPHEN 500 MG
1000 TABLET ORAL ONCE
Refills: 0 | Status: COMPLETED | OUTPATIENT
Start: 2021-06-11 | End: 2021-06-11

## 2021-06-11 RX ORDER — POTASSIUM CHLORIDE 20 MEQ
10 PACKET (EA) ORAL
Refills: 0 | Status: COMPLETED | OUTPATIENT
Start: 2021-06-11 | End: 2021-06-12

## 2021-06-11 RX ORDER — AMPICILLIN SODIUM AND SULBACTAM SODIUM 250; 125 MG/ML; MG/ML
3 INJECTION, POWDER, FOR SUSPENSION INTRAMUSCULAR; INTRAVENOUS ONCE
Refills: 0 | Status: COMPLETED | OUTPATIENT
Start: 2021-06-11 | End: 2021-06-11

## 2021-06-11 RX ORDER — HYDROMORPHONE HYDROCHLORIDE 2 MG/ML
0.5 INJECTION INTRAMUSCULAR; INTRAVENOUS; SUBCUTANEOUS EVERY 4 HOURS
Refills: 0 | Status: DISCONTINUED | OUTPATIENT
Start: 2021-06-11 | End: 2021-06-14

## 2021-06-11 RX ORDER — ENOXAPARIN SODIUM 100 MG/ML
40 INJECTION SUBCUTANEOUS DAILY
Refills: 0 | Status: DISCONTINUED | OUTPATIENT
Start: 2021-06-11 | End: 2021-06-23

## 2021-06-11 RX ADMIN — AMPICILLIN SODIUM AND SULBACTAM SODIUM 200 GRAM(S): 250; 125 INJECTION, POWDER, FOR SUSPENSION INTRAMUSCULAR; INTRAVENOUS at 21:07

## 2021-06-11 RX ADMIN — HYDROMORPHONE HYDROCHLORIDE 0.5 MILLIGRAM(S): 2 INJECTION INTRAMUSCULAR; INTRAVENOUS; SUBCUTANEOUS at 23:19

## 2021-06-11 RX ADMIN — Medication 50 GRAM(S): at 23:00

## 2021-06-11 RX ADMIN — Medication 300 MILLIGRAM(S): at 22:15

## 2021-06-11 RX ADMIN — Medication 400 MILLIGRAM(S): at 23:04

## 2021-06-11 RX ADMIN — AMPICILLIN SODIUM AND SULBACTAM SODIUM 200 GRAM(S): 250; 125 INJECTION, POWDER, FOR SUSPENSION INTRAMUSCULAR; INTRAVENOUS at 07:48

## 2021-06-11 RX ADMIN — SODIUM CHLORIDE 1000 MILLILITER(S): 9 INJECTION, SOLUTION INTRAVENOUS at 20:05

## 2021-06-11 RX ADMIN — Medication 500 MILLILITER(S): at 20:50

## 2021-06-11 RX ADMIN — HYDROMORPHONE HYDROCHLORIDE 0.5 MILLIGRAM(S): 2 INJECTION INTRAMUSCULAR; INTRAVENOUS; SUBCUTANEOUS at 23:37

## 2021-06-11 RX ADMIN — Medication 1000 MILLIGRAM(S): at 23:37

## 2021-06-11 RX ADMIN — Medication 50 GRAM(S): at 22:23

## 2021-06-11 NOTE — CONSULT NOTE ADULT - ASSESSMENT
60M who was transferred from an OSH for w/u of a laryngeal mass.  Initially underwent an awake trach w/ 6 LPC, now s/p mass removal, total laryngectomy, b/l neck dissection, right thyroidectomy, and pharyngeal reconstruction w/ left free ALT flap.     60M who was transferred from an OSH for w/u of a laryngeal mass.  Initially underwent an awake trach w/ 6 LPC, now s/p mass removal, total laryngectomy, b/l neck dissection, right thyroidectomy, and pharyngeal reconstruction w/ left free ALT flap.    Neuro  - Tylenol and dilaudid PRN for pain  - Nicotine patch    Cardiovascular  - Pharyngeal reconstruction w/ left free ALT flap  - Q1 vascular checks with pencil doppler (area marked w/ a black nylon stitch)  - On ASA    Respiratory  - Open trach last week, 8 LPC  - Maintain SaO2 > 92%    Skin  - S/p mass removal, total laryngectomy, b/l neck dissection, right thyroidectomy, and pharyngeal reconstruction w/ left free ALT flap  - Two drains in neck w/ serosanguinous fluids  - Q1 doppler checks w/ pencil doppler    GI  - Bowel regimen w/ senna and miralax  - NPO    /Renal  - LR 75cc/hr  - Total and ionized calcium low, 2g Calcium Gluconate given     60M who was transferred from an OSH for w/u of a laryngeal mass.  Initially underwent an awake trach w/ 6 LPC, now s/p mass removal, total laryngectomy, b/l neck dissection, right thyroidectomy, and pharyngeal reconstruction w/ left free ALT flap.    Neuro  - Tylenol and dilaudid PRN for pain  - Nicotine patch    Head/Neck  - S/p mass removal, total laryngectomy, b/l neck dissection, right thyroidectomy, and pharyngeal reconstruction w/ left free ALT flap  - Two drains in neck w/ serosanguinous fluids  - Q1 doppler checks w/ pencil doppler    Cardiovascular  - No active issues    Respiratory  - Open trach last week, 8 LPC  - Maintain SaO2 > 92%    GI  - NGT  - Bowel regimen w/ senna and miralax  - NPO    /Renal  - LR 75cc/hr  - Total and ionized calcium low, 2g Calcium Gluconate given  - Trend labs    ID  - On unasyn  - Trend WBC count, fever curve    Heme  - Hgb drop from 15.4 to 10.3  - Continue to monitor  - Lovenox for DVT PPX    Endo  - No active issues  - PTH 23  - Monitor BG  - Monitor calcium and electrolytes

## 2021-06-11 NOTE — PRE-OP CHECKLIST - BSA (M2)
1.92
Eye Problem(s):negative  ENT Problem(s):negative  Cardiovascular problem(s):negative  Respiratory problem(s):negative  Gastro-intestinal problem(s):negative GI  Genito-urinary problem(s):negative  Musculoskeletal problem(s):negative  Integumentary problem(s):negative  Neurological problem(s):negative  Psychiatric problem(s):negative  Endocrine problem(s):negative  Hematologic and/or Lymphatic problem(s):negative    
1.33

## 2021-06-11 NOTE — PROGRESS NOTE ADULT - SUBJECTIVE AND OBJECTIVE BOX
Patient seen and examined, no acute events. Preop CTs done, SLP evaluated. OR today for TL    Vital Signs Last 24 Hrs  T(C): 36.5 (11 Jun 2021 05:35), Max: 37.1 (10 Isaac 2021 10:00)  T(F): 97.7 (11 Jun 2021 05:35), Max: 98.7 (10 Isaac 2021 10:00)  HR: 72 (11 Jun 2021 05:35) (72 - 102)  BP: 133/72 (11 Jun 2021 05:35) (114/88 - 136/82)  BP(mean): --  RR: 18 (11 Jun 2021 05:35) (18 - 19)  SpO2: 99% (11 Jun 2021 05:35) (95% - 99%)      Well appearing, NAD  Breathing comfortably on RA, no stridor, no stertor  NC clear, 6CFS in place, soft collar  Minimal secretions  Neck otherwise soft/flat        A/P: 60 p/w laryngeal mass with resp distress, s/p trach.   - NPO /IVF  - OR today  - f/u hyponatremia  - f/u hospitalist reccs - SENA cleared with no further workup needed  - dysphagia diet  - fluid restriction per hospitalist  - PT  - f/u labs  - pain control  - bowel regimen  - discussed with attending

## 2021-06-11 NOTE — BRIEF OPERATIVE NOTE - ANTIBIOTIC PROTOCOL
Followed protocol
to person, place, and time. She displays normal reflexes. No cranial nerve deficit. She exhibits normal muscle tone. Coordination normal.   Skin: Skin is warm and dry. No rash noted. She is not diaphoretic. No erythema. No pallor. Psychiatric: She has a normal mood and affect. Her behavior is normal. Judgment and thought content normal.       Assessment:      1. Muscle spasm  calcium carbonate-vitamin D (CALCIUM 600+D) 600-200 MG-UNIT TABS    CBC Auto Differential    RENAL FUNCTION PANEL    MAGNESIUM   2. Left sided sciatica resolved with spinal surgery. Will taper off of topamax as symptoms allow. topiramate (TOPAMAX) 50 MG tablet           Plan:      Dwight Casarez received counseling on the following healthy behaviors: medication adherence    Patient given educational materials on After visit summary with diagnosis and treatment plan. I have instructed Dwight Casarez to complete a self tracking handout on Weights and instructed them to bring it with them to her next appointment. Discussed use, benefit, and side effects of prescribed medications. Barriers to medication compliance addressed. All patient questions answered. Pt voiced understanding. Patient is taking over the counter meds and discussed as to how they interact with prescription medications. Return in one month.

## 2021-06-11 NOTE — CONSULT NOTE ADULT - SUBJECTIVE AND OBJECTIVE BOX
HISTORY OF PRESENT ILLNESS:    Patient is a 60y Male with unknown PMHx (?stomach cancer, s/p colostomy tube 2018) who presents as a transfer from OSH for workup of a laryngeal mass. Per pt has been having SOB on exertion and laying flat, dysphonia for the past 4 weeks and it is progressing. Pt states he has a heavy history of smoking (1 ppd x 46 years) and alcohol abuse (10 16 oz beers x 19 years). Pt denies fevers/weight loss/chills/SOB. States he does not think he has trouble eating/denies dysphagia.    CT scan from OSH reviewed. Showing R laryngeal mass, likely subglottic extending to cricoid.  Had an awake trach. 6 LPC, on 6/4, and returned to OR on 6/11 for mass removal w/ total laryngectomy, b/l neck dissection, right thyroidectomy, pharynx reconstruction w/ left free ALT flap.  SICU consulted for q1 flap checks.      VITAL SIGNS:  ICU Vital Signs Last 24 Hrs  T(C): 37.3 (11 Jun 2021 19:35), Max: 37.3 (11 Jun 2021 19:35)  T(F): 99.1 (11 Jun 2021 19:35), Max: 99.1 (11 Jun 2021 19:35)  HR: 93 (11 Jun 2021 21:45) (72 - 96)  BP: 111/62 (11 Jun 2021 21:45) (90/57 - 140/70)  BP(mean): 74 (11 Jun 2021 21:45) (65 - 81)  ABP: 139/55 (11 Jun 2021 21:45) (92/47 - 139/55)  ABP(mean): 82 (11 Jun 2021 21:45) (62 - 97)  RR: 17 (11 Jun 2021 21:45) (12 - 21)  SpO2: 99% (11 Jun 2021 21:45) (95% - 100%)      NEURO  Exam:  Meds:acetaminophen   Tablet .. 650 milliGRAM(s) Oral every 6 hours PRN Mild Pain (1 - 3)  oxyCODONE    IR 5 milliGRAM(s) Oral every 6 hours PRN Moderate Pain (4 - 6)  oxyCODONE    IR 10 milliGRAM(s) Oral every 6 hours PRN Severe Pain (7 - 10)      RESPIRATORY  Mechanical Ventilation:   ABG - ( 11 Jun 2021 21:26 )  pH: 7.44  /  pCO2: 35    /  pO2: 108   / HCO3: 25    / Base Excess: 0.2   /  SaO2: 98.5    Lactate: x                Exam:  Meds:    CARDIOVASCULAR    Exam:  Cardiac Rhythm:  Meds:    GI/NUTRITION  Exam:  Diet:  Meds:polyethylene glycol 3350 17 Gram(s) Oral daily  senna 2 Tablet(s) Oral at bedtime      GENITOURINARY/RENAL  Meds:lactated ringers. 500 milliLiter(s) IV Continuous <Continuous>      06-10 @ 07:01  -  06-11 @ 07:00  --------------------------------------------------------  IN:    Lactated Ringers: 225 mL    Oral Fluid: 460 mL  Total IN: 685 mL    OUT:    Colostomy (mL): 525 mL    Voided (mL): 1525 mL  Total OUT: 2050 mL    Total NET: -1365 mL      06-11 @ 07:01  -  06-11 @ 21:58  --------------------------------------------------------  IN:    IV PiggyBack: 350 mL    Lactated Ringers: 225 mL    Lactated Ringers Bolus: 500 mL  Total IN: 1075 mL    OUT:    Bulb (mL): 15 mL    Bulb (mL): 45 mL    Bulb (mL): 35 mL    Indwelling Catheter - Urethral (mL): 210 mL  Total OUT: 305 mL    Total NET: 770 mL        Weight (kg): 72.6 (06-11 @ 07:25)  06-11    128<L>  |  94<L>  |  9   ----------------------------<  83  4.6   |  23  |  0.75    Ca    9.3      11 Jun 2021 07:22  Phos  2.9     06-11  Mg     1.2     06-11    TPro  6.7  /  Alb  3.3  /  TBili  1.6<H>  /  DBili  0.3<H>  /  AST  15  /  ALT  21  /  AlkPhos  65  06-11    [ ] Valdivia catheter, indication: urine output monitoring in critically ill patient    HEMATOLOGIC  [ ] VTE Prophylaxis:  enoxaparin Injectable 40 milliGRAM(s) SubCutaneous daily                          10.3   6.87  )-----------( 135      ( 11 Jun 2021 20:48 )             29.3     PT/INR - ( 11 Jun 2021 07:22 )   PT: 11.3 sec;   INR: 0.98 ratio           Transfusion: [ ] PRBC	[ ] Platelets	[ ] FFP	[ ] Cryoprecipitate      INFECTIOUS DISEASES  Meds:  RECENT CULTURES:      ENDOCRINE  Meds:  CAPILLARY BLOOD GLUCOSE          PATIENT CARE ACCESS DEVICES:  [ ] Peripheral IV  [ ] Central Venous Line	[ ] R	[ ] L	[ ] IJ	[ ] Fem	[ ] SC	Placed:   [ ] Arterial Line		[ ] R	[ ] L	[ ] Fem	[ ] Rad	[ ] Ax	Placed:   [ ] PICC:					[ ] Mediport  [ ] Urinary Catheter, Date Placed:   [x] Necessity of urinary, arterial, and venous catheters discussed    OTHER MEDICATIONS: nicotine -   7 mG/24Hr(s) Patch 1 patch Transdermal daily      IMAGING STUDIES: HISTORY OF PRESENT ILLNESS:    Patient is a 60y Male with unknown PMHx (?stomach cancer, s/p colostomy tube 2018) who presents as a transfer from OSH for workup of a laryngeal mass. Per pt has been having SOB on exertion and laying flat, dysphonia for the past 4 weeks and it is progressing. Pt states he has a heavy history of smoking (1 ppd x 46 years) and alcohol abuse (10 16 oz beers x 19 years). Pt denies fevers/weight loss/chills/SOB. States he does not think he has trouble eating/denies dysphagia.    CT scan from OSH reviewed. Showing R laryngeal mass, likely subglottic extending to cricoid.  Had an awake trach. 6 LPC, on 6/4, and returned to OR on 6/11 for mass removal w/ total laryngectomy, b/l neck dissection, right thyroidectomy, pharynx reconstruction w/ left free ALT flap.  SICU consulted for q1 flap checks.      VITAL SIGNS:  ICU Vital Signs Last 24 Hrs  T(C): 37.3 (11 Jun 2021 19:35), Max: 37.3 (11 Jun 2021 19:35)  T(F): 99.1 (11 Jun 2021 19:35), Max: 99.1 (11 Jun 2021 19:35)  HR: 93 (11 Jun 2021 21:45) (72 - 96)  BP: 111/62 (11 Jun 2021 21:45) (90/57 - 140/70)  BP(mean): 74 (11 Jun 2021 21:45) (65 - 81)  ABP: 139/55 (11 Jun 2021 21:45) (92/47 - 139/55)  ABP(mean): 82 (11 Jun 2021 21:45) (62 - 97)  RR: 17 (11 Jun 2021 21:45) (12 - 21)  SpO2: 99% (11 Jun 2021 21:45) (95% - 100%)      NEURO  Exam: AAOx3  Meds:acetaminophen   Tablet .. 650 milliGRAM(s) Oral every 6 hours PRN Mild Pain (1 - 3)  oxyCODONE    IR 5 milliGRAM(s) Oral every 6 hours PRN Moderate Pain (4 - 6)  oxyCODONE    IR 10 milliGRAM(s) Oral every 6 hours PRN Severe Pain (7 - 10)    RESPIRATORY; CTA b/l, no r/r/w, currently on trach collar    CARDIOVASCULAR: S1, S2 no m/r/g    GI/NUTRITION  Exam:  Diet:  Meds:polyethylene glycol 3350 17 Gram(s) Oral daily  senna 2 Tablet(s) Oral at bedtime      GENITOURINARY/RENAL  Meds:lactated ringers. 500 milliLiter(s) IV Continuous <Continuous>      06-10 @ 07:01  -  06-11 @ 07:00  --------------------------------------------------------  IN:    Lactated Ringers: 225 mL    Oral Fluid: 460 mL  Total IN: 685 mL    OUT:    Colostomy (mL): 525 mL    Voided (mL): 1525 mL  Total OUT: 2050 mL    Total NET: -1365 mL      06-11 @ 07:01  -  06-11 @ 21:58  --------------------------------------------------------  IN:    IV PiggyBack: 350 mL    Lactated Ringers: 225 mL    Lactated Ringers Bolus: 500 mL  Total IN: 1075 mL    OUT:    Bulb (mL): 15 mL    Bulb (mL): 45 mL    Bulb (mL): 35 mL    Indwelling Catheter - Urethral (mL): 210 mL  Total OUT: 305 mL    Total NET: 770 mL        Weight (kg): 72.6 (06-11 @ 07:25)  06-11    128<L>  |  94<L>  |  9   ----------------------------<  83  4.6   |  23  |  0.75    Ca    9.3      11 Jun 2021 07:22  Phos  2.9     06-11  Mg     1.2     06-11    TPro  6.7  /  Alb  3.3  /  TBili  1.6<H>  /  DBili  0.3<H>  /  AST  15  /  ALT  21  /  AlkPhos  65  06-11    [ ] Valdivia catheter, indication: urine output monitoring in critically ill patient    HEMATOLOGIC  [ ] VTE Prophylaxis:  enoxaparin Injectable 40 milliGRAM(s) SubCutaneous daily                          10.3   6.87  )-----------( 135      ( 11 Jun 2021 20:48 )             29.3     PT/INR - ( 11 Jun 2021 07:22 )   PT: 11.3 sec;   INR: 0.98 ratio           Transfusion: [ ] PRBC	[ ] Platelets	[ ] FFP	[ ] Cryoprecipitate      INFECTIOUS DISEASES  Meds:  RECENT CULTURES:      ENDOCRINE  Meds:  CAPILLARY BLOOD GLUCOSE          PATIENT CARE ACCESS DEVICES:  [ ] Peripheral IV  [ ] Central Venous Line	[ ] R	[ ] L	[ ] IJ	[ ] Fem	[ ] SC	Placed:   [ ] Arterial Line		[ ] R	[ ] L	[ ] Fem	[ ] Rad	[ ] Ax	Placed:   [ ] PICC:					[ ] Mediport  [ ] Urinary Catheter, Date Placed:   [x] Necessity of urinary, arterial, and venous catheters discussed    OTHER MEDICATIONS: nicotine -   7 mG/24Hr(s) Patch 1 patch Transdermal daily      IMAGING STUDIES: HISTORY OF PRESENT ILLNESS:    Patient is a 60y Male with unknown PMHx (?stomach cancer, s/p colostomy tube 2018) who presents as a transfer from OSH for workup of a laryngeal mass. Per pt has been having SOB on exertion and laying flat, dysphonia for the past 4 weeks and it is progressing. Pt states he has a heavy history of smoking (1 ppd x 46 years) and alcohol abuse (10 16 oz beers x 19 years). Pt denies fevers/weight loss/chills/SOB. States he does not think he has trouble eating/denies dysphagia.    CT scan from OSH reviewed. Showing R laryngeal mass, likely subglottic extending to cricoid.  Had an awake trach. 6 LPC, on 6/4, and returned to OR on 6/11 for mass removal w/ total laryngectomy, b/l neck dissection, right thyroidectomy, pharynx reconstruction w/ left free ALT flap.  SICU consulted for q1 flap checks.      VITAL SIGNS:  ICU Vital Signs Last 24 Hrs  T(C): 37.3 (11 Jun 2021 19:35), Max: 37.3 (11 Jun 2021 19:35)  T(F): 99.1 (11 Jun 2021 19:35), Max: 99.1 (11 Jun 2021 19:35)  HR: 93 (11 Jun 2021 21:45) (72 - 96)  BP: 111/62 (11 Jun 2021 21:45) (90/57 - 140/70)  BP(mean): 74 (11 Jun 2021 21:45) (65 - 81)  ABP: 139/55 (11 Jun 2021 21:45) (92/47 - 139/55)  ABP(mean): 82 (11 Jun 2021 21:45) (62 - 97)  RR: 17 (11 Jun 2021 21:45) (12 - 21)  SpO2: 99% (11 Jun 2021 21:45) (95% - 100%)      NEURO  Exam: AAOx3  Meds:acetaminophen   Tablet .. 650 milliGRAM(s) Oral every 6 hours PRN Mild Pain (1 - 3)  oxyCODONE    IR 5 milliGRAM(s) Oral every 6 hours PRN Moderate Pain (4 - 6)  oxyCODONE    IR 10 milliGRAM(s) Oral every 6 hours PRN Severe Pain (7 - 10)    RESPIRATORY; CTA b/l, no r/r/w, currently on trach collar    CARDIOVASCULAR: S1, S2 no m/r/g    GI/NUTRITION: Abd soft, NT/ND, colostomy bag w/ brown stool.  Meds:polyethylene glycol 3350 17 Gram(s) Oral daily  senna 2 Tablet(s) Oral at bedtime      GENITOURINARY/RENAL  Meds:lactated ringers. 500 milliLiter(s) IV Continuous <Continuous>      06-10 @ 07:01  -  06-11 @ 07:00  --------------------------------------------------------  IN:    Lactated Ringers: 225 mL    Oral Fluid: 460 mL  Total IN: 685 mL    OUT:    Colostomy (mL): 525 mL    Voided (mL): 1525 mL  Total OUT: 2050 mL    Total NET: -1365 mL      06-11 @ 07:01  -  06-11 @ 21:58  --------------------------------------------------------  IN:    IV PiggyBack: 350 mL    Lactated Ringers: 225 mL    Lactated Ringers Bolus: 500 mL  Total IN: 1075 mL    OUT:    Bulb (mL): 15 mL    Bulb (mL): 45 mL    Bulb (mL): 35 mL    Indwelling Catheter - Urethral (mL): 210 mL  Total OUT: 305 mL    Total NET: 770 mL        Weight (kg): 72.6 (06-11 @ 07:25)  06-11    128<L>  |  94<L>  |  9   ----------------------------<  83  4.6   |  23  |  0.75    Ca    9.3      11 Jun 2021 07:22  Phos  2.9     06-11  Mg     1.2     06-11    TPro  6.7  /  Alb  3.3  /  TBili  1.6<H>  /  DBili  0.3<H>  /  AST  15  /  ALT  21  /  AlkPhos  65  06-11    [ ] Valdivia catheter, indication: urine output monitoring in critically ill patient    HEMATOLOGIC  [x] VTE Prophylaxis:  enoxaparin Injectable 40 milliGRAM(s) SubCutaneous daily                          10.3   6.87  )-----------( 135      ( 11 Jun 2021 20:48 )             29.3     PT/INR - ( 11 Jun 2021 07:22 )   PT: 11.3 sec;   INR: 0.98 ratio           Transfusion: [ ] PRBC	[ ] Platelets	[ ] FFP	[ ] Cryoprecipitate          PATIENT CARE ACCESS DEVICES:  [x] Peripheral IV  [ ] Central Venous Line	[ ] R	[ ] L	[ ] IJ	[ ] Fem	[ ] SC	Placed:   [ ] Arterial Line		[ ] R	[ ] L	[ ] Fem	[ ] Rad	[ ] Ax	Placed:   [ ] PICC:					[ ] Mediport  [ ] Urinary Catheter, Date Placed:   [x] Necessity of urinary, arterial, and venous catheters discussed    OTHER MEDICATIONS: nicotine -   7 mG/24Hr(s) Patch 1 patch Transdermal daily      IMAGING STUDIES:

## 2021-06-11 NOTE — BRIEF OPERATIVE NOTE - NSICDXBRIEFPROCEDURE_GEN_ALL_CORE_FT
PROCEDURES:  Open tracheostomy 04-Jun-2021 14:39:41  Lg Chauhan  
PROCEDURES:  Radical laryngectomy 11-Jun-2021 18:13:30  Jayy Keys  Thyroid lobectomy, total, right 11-Jun-2021 18:13:41  Jayy Keys  Right modified neck dissection 11-Jun-2021 18:14:03  Jayy Keys  Neck dissection, modified, left 11-Jun-2021 18:14:28  Jayy Keys  Transesophageal puncture with insertion of voice prosthesis 11-Jun-2021 18:15:08  Jayy Keys  Reconstruction, flap, pharynx 11-Jun-2021 18:17:04  Jayy Keys  Free skin flap with microvascular anastomosis 11-Jun-2021 18:17:31  Jayy Keys

## 2021-06-11 NOTE — BRIEF OPERATIVE NOTE - OPERATION/FINDINGS
s/p awake tracheostomy, 6LPC, secured with 4 silk sutures and trach tie  direct laryngoscopy and biopsy of R arytenoid  esophagoscopy
Margins negative.  R paratracheal adenopathy present and completely removed

## 2021-06-12 LAB
ANION GAP SERPL CALC-SCNC: 12 MMOL/L — SIGNIFICANT CHANGE UP (ref 7–14)
BUN SERPL-MCNC: 5 MG/DL — LOW (ref 7–23)
CA-I BLD-SCNC: 1.06 MMOL/L — LOW (ref 1.15–1.29)
CALCIUM SERPL-MCNC: 8.2 MG/DL — LOW (ref 8.4–10.5)
CHLORIDE SERPL-SCNC: 99 MMOL/L — SIGNIFICANT CHANGE UP (ref 98–107)
CO2 SERPL-SCNC: 19 MMOL/L — LOW (ref 22–31)
CREAT SERPL-MCNC: 0.6 MG/DL — SIGNIFICANT CHANGE UP (ref 0.5–1.3)
GLUCOSE SERPL-MCNC: 111 MG/DL — HIGH (ref 70–99)
HCT VFR BLD CALC: 32.5 % — LOW (ref 39–50)
HGB BLD-MCNC: 11.2 G/DL — LOW (ref 13–17)
MAGNESIUM SERPL-MCNC: 1.9 MG/DL — SIGNIFICANT CHANGE UP (ref 1.6–2.6)
MCHC RBC-ENTMCNC: 33.6 PG — SIGNIFICANT CHANGE UP (ref 27–34)
MCHC RBC-ENTMCNC: 34.5 GM/DL — SIGNIFICANT CHANGE UP (ref 32–36)
MCV RBC AUTO: 97.6 FL — SIGNIFICANT CHANGE UP (ref 80–100)
NRBC # BLD: 0 /100 WBCS — SIGNIFICANT CHANGE UP
NRBC # FLD: 0 K/UL — SIGNIFICANT CHANGE UP
PHOSPHATE SERPL-MCNC: 3.7 MG/DL — SIGNIFICANT CHANGE UP (ref 2.5–4.5)
PLATELET # BLD AUTO: 132 K/UL — LOW (ref 150–400)
POTASSIUM SERPL-MCNC: 4.4 MMOL/L — SIGNIFICANT CHANGE UP (ref 3.5–5.3)
POTASSIUM SERPL-SCNC: 4.4 MMOL/L — SIGNIFICANT CHANGE UP (ref 3.5–5.3)
RBC # BLD: 3.33 M/UL — LOW (ref 4.2–5.8)
RBC # FLD: 11.8 % — SIGNIFICANT CHANGE UP (ref 10.3–14.5)
SODIUM SERPL-SCNC: 130 MMOL/L — LOW (ref 135–145)
WBC # BLD: 7.54 K/UL — SIGNIFICANT CHANGE UP (ref 3.8–10.5)
WBC # FLD AUTO: 7.54 K/UL — SIGNIFICANT CHANGE UP (ref 3.8–10.5)

## 2021-06-12 PROCEDURE — 99232 SBSQ HOSP IP/OBS MODERATE 35: CPT

## 2021-06-12 RX ORDER — SODIUM CHLORIDE 9 MG/ML
1000 INJECTION INTRAMUSCULAR; INTRAVENOUS; SUBCUTANEOUS
Refills: 0 | Status: DISCONTINUED | OUTPATIENT
Start: 2021-06-12 | End: 2021-06-13

## 2021-06-12 RX ORDER — CALCIUM GLUCONATE 100 MG/ML
2 VIAL (ML) INTRAVENOUS ONCE
Refills: 0 | Status: COMPLETED | OUTPATIENT
Start: 2021-06-12 | End: 2021-06-12

## 2021-06-12 RX ORDER — ACETAMINOPHEN 500 MG
650 TABLET ORAL EVERY 6 HOURS
Refills: 0 | Status: DISCONTINUED | OUTPATIENT
Start: 2021-06-12 | End: 2021-06-18

## 2021-06-12 RX ORDER — FOLIC ACID 0.8 MG
1 TABLET ORAL DAILY
Refills: 0 | Status: DISCONTINUED | OUTPATIENT
Start: 2021-06-12 | End: 2021-06-14

## 2021-06-12 RX ORDER — THIAMINE MONONITRATE (VIT B1) 100 MG
100 TABLET ORAL DAILY
Refills: 0 | Status: DISCONTINUED | OUTPATIENT
Start: 2021-06-12 | End: 2021-06-14

## 2021-06-12 RX ADMIN — Medication 100 MILLIEQUIVALENT(S): at 03:06

## 2021-06-12 RX ADMIN — Medication 100 MILLIEQUIVALENT(S): at 00:49

## 2021-06-12 RX ADMIN — Medication 1 TABLET(S): at 11:19

## 2021-06-12 RX ADMIN — AMPICILLIN SODIUM AND SULBACTAM SODIUM 100 GRAM(S): 250; 125 INJECTION, POWDER, FOR SUSPENSION INTRAMUSCULAR; INTRAVENOUS at 09:38

## 2021-06-12 RX ADMIN — Medication 1 MILLIGRAM(S): at 11:19

## 2021-06-12 RX ADMIN — Medication 650 MILLIGRAM(S): at 12:00

## 2021-06-12 RX ADMIN — AMPICILLIN SODIUM AND SULBACTAM SODIUM 100 GRAM(S): 250; 125 INJECTION, POWDER, FOR SUSPENSION INTRAMUSCULAR; INTRAVENOUS at 21:06

## 2021-06-12 RX ADMIN — AMPICILLIN SODIUM AND SULBACTAM SODIUM 100 GRAM(S): 250; 125 INJECTION, POWDER, FOR SUSPENSION INTRAMUSCULAR; INTRAVENOUS at 03:08

## 2021-06-12 RX ADMIN — AMPICILLIN SODIUM AND SULBACTAM SODIUM 100 GRAM(S): 250; 125 INJECTION, POWDER, FOR SUSPENSION INTRAMUSCULAR; INTRAVENOUS at 15:23

## 2021-06-12 RX ADMIN — Medication 650 MILLIGRAM(S): at 11:20

## 2021-06-12 RX ADMIN — Medication 81 MILLIGRAM(S): at 11:19

## 2021-06-12 RX ADMIN — ENOXAPARIN SODIUM 40 MILLIGRAM(S): 100 INJECTION SUBCUTANEOUS at 11:18

## 2021-06-12 RX ADMIN — Medication 100 MILLIGRAM(S): at 11:19

## 2021-06-12 RX ADMIN — Medication 650 MILLIGRAM(S): at 21:08

## 2021-06-12 RX ADMIN — Medication 650 MILLIGRAM(S): at 21:57

## 2021-06-12 RX ADMIN — SODIUM CHLORIDE 100 MILLILITER(S): 9 INJECTION INTRAMUSCULAR; INTRAVENOUS; SUBCUTANEOUS at 09:04

## 2021-06-12 RX ADMIN — Medication 200 GRAM(S): at 23:59

## 2021-06-12 RX ADMIN — Medication 200 GRAM(S): at 23:45

## 2021-06-12 RX ADMIN — Medication 100 MILLIEQUIVALENT(S): at 01:57

## 2021-06-12 NOTE — PROGRESS NOTE ADULT - SUBJECTIVE AND OBJECTIVE BOX
Patient seen and examined overnight, no acute events.     T(C): 36.5 (06-12-21 @ 03:45), Max: 37.3 (06-11-21 @ 19:35)  HR: 83 (06-12-21 @ 05:00) (72 - 96)  BP: 105/56 (06-12-21 @ 05:00) (90/57 - 140/70)  RR: 18 (06-12-21 @ 05:00) (12 - 21)  SpO2: 100% (06-12-21 @ 05:00) (98% - 100%)  Alert, interactive  NGT in place  OC mucosa pink and moist  Neck with apron incision, staples in place, incision c/d/i, mild periincisional fullness, soft  Laryngectomy stoma with 8LPC sutured in place - cuff deflated with minimal secretions  Bilateral neck JPs, SS  Suture doppler site with good signal  L ALT incision with staples, c/d/i  KOREY with SS output                          10.3   6.87  )-----------( 135      ( 11 Jun 2021 20:48 )             29.3   06-11    130<L>  |  99  |  7   ----------------------------<  133<H>  3.5   |  21<L>  |  0.63    Ca    7.5<L>      11 Jun 2021 20:48  Phos  2.9     06-11  Mg     1.2     06-11    TPro  6.7  /  Alb  3.3  /  TBili  1.6<H>  /  DBili  0.3<H>  /  AST  15  /  ALT  21  /  AlkPhos  65  06-11    A/P: 61yo M with larynx ca s/p TL, bl SND, reconstruction with L ALT and placement of TEP 6/11, recovering appropriately.   - humidified O2 via laryngectomy stoma  - trach collar sutures to stay in place for now  - routine trach care via laryngectomy stoma  - baci to incision  - JPs to bulb suction, monitor output  - asa/lov per prs  - flap checks per prs  - unasyn x24 hours vs while drains in place  - fu CXR to confirm placement  - start NGT feeds in AM once CXR confirmed  - dc lackey  Zuleyma Amor gayla per SICU  - please call ENT with questions

## 2021-06-12 NOTE — PROGRESS NOTE ADULT - SUBJECTIVE AND OBJECTIVE BOX
SICU Daily Progress Note:    HPI: 60 y.o. Male with unknown PMHx (?stomach cancer, s/p colostomy tube 2018) who presents as a transfer from OSH for workup of a laryngeal mass. Per pt has been having SOB on exertion and laying flat, dysphonia for the past 4 weeks and it is progressing. Pt states he has a heavy history of smoking (1 ppd x 46 years) and alcohol abuse (10 16 oz beers x 19 years). Pt denies fevers/weight loss/chills/SOB. States he does not think he has trouble eating/denies dysphagia.    CT scan from OSH reviewed. Showing R laryngeal mass, likely subglottic extending to cricoid.  Had an awake trach. 6 LPC, on 6/4, and returned to OR on 6/11 for mass removal w/ total laryngectomy, b/l neck dissection, right thyroidectomy, pharynx reconstruction w/ left free ALT flap.  SICU consulted for q1 flap checks.    Overnight events:  - NS 100c/hr for post op Na 130  - MAPs > 65, BP 90s/50s but still mentating  - Hgb pre-op vs post op dropped from 15.4 to 10.3  - u/o greater 120cc/hr  - will continue to monitor BP    MEDICATIONS  (STANDING):  ampicillin/sulbactam  IVPB 1.5 Gram(s) IV Intermittent every 6 hours  aspirin  chewable 81 milliGRAM(s) Oral daily  enoxaparin Injectable 40 milliGRAM(s) SubCutaneous daily  lactated ringers. 500 milliLiter(s) (100 mL/Hr) IV Continuous <Continuous>  nicotine -   7 mG/24Hr(s) Patch 1 patch Transdermal daily  polyethylene glycol 3350 17 Gram(s) Oral daily  potassium chloride  10 mEq/100 mL IVPB 10 milliEquivalent(s) IV Intermittent every 1 hour  senna 2 Tablet(s) Oral at bedtime    MEDICATIONS  (PRN):  acetaminophen   Tablet .. 650 milliGRAM(s) Oral every 6 hours PRN Mild Pain (1 - 3)  HYDROmorphone  Injectable 0.5 milliGRAM(s) IV Push every 4 hours PRN Moderate Pain (4 - 6)      VITAL SIGNS:  ICU Vital Signs Last 24 Hrs  T(C): 37.3 (11 Jun 2021 19:35), Max: 37.3 (11 Jun 2021 19:35)  T(F): 99.1 (11 Jun 2021 19:35), Max: 99.1 (11 Jun 2021 19:35)  HR: 93 (11 Jun 2021 21:45) (72 - 96)  BP: 111/62 (11 Jun 2021 21:45) (90/57 - 140/70)  BP(mean): 74 (11 Jun 2021 21:45) (65 - 81)  ABP: 139/55 (11 Jun 2021 21:45) (92/47 - 139/55)  ABP(mean): 82 (11 Jun 2021 21:45) (62 - 97)  RR: 17 (11 Jun 2021 21:45) (12 - 21)  SpO2: 99% (11 Jun 2021 21:45) (95% - 100%)    I&O's Detail    10 Isaac 2021 07:01  -  11 Jun 2021 07:00  --------------------------------------------------------  IN:    Lactated Ringers: 225 mL    Oral Fluid: 460 mL  Total IN: 685 mL    OUT:    Colostomy (mL): 525 mL    Voided (mL): 1525 mL  Total OUT: 2050 mL    Total NET: -1365 mL      11 Jun 2021 07:01  -  12 Jun 2021 00:43  --------------------------------------------------------  IN:    IV PiggyBack: 650 mL    Lactated Ringers: 375 mL    Lactated Ringers Bolus: 500 mL  Total IN: 1525 mL    OUT:    Bulb (mL): 25 mL    Bulb (mL): 60 mL    Bulb (mL): 75 mL    Colostomy (mL): 100 mL    Indwelling Catheter - Urethral (mL): 630 mL  Total OUT: 890 mL    Total NET: 635 mL        NEURO  Exam: awake, tracks, follows commands     RESPIRATORY  Exam: CTA b/l, no r/r/w, currently on trach collar    CARDIOVASCULAR:  Exam S1, S2 no m/r/g    GI/NUTRITION:  Exam: Abd soft, NT/ND, colostomy bag w/ brown stool.      PATIENT CARE ACCESS DEVICES:  [x] Peripheral IV  [ ] Central Venous Line	[ ] R	[ ] L	[ ] IJ	[ ] Fem	[ ] SC	Placed:   [ ] Arterial Line		[ ] R	[ ] L	[ ] Fem	[ ] Rad	[ ] Ax	Placed:   [ ] PICC:					[ ] Mediport  [ ] Urinary Catheter, Date Placed:   [x] Necessity of urinary, arterial, and venous catheters discussed    CBC (06-11 @ 20:48)                              10.3<L>                         6.87    )----------------(  135<L>     --    % Neutrophils, --    % Lymphocytes, ANC: --                                  29.3<L>  CBC (06-11 @ 07:22)                              15.4                           5.25    )----------------(  166        --    % Neutrophils, --    % Lymphocytes, ANC: --                                  45.5      BMP (06-11 @ 20:48)             130<L>  |  99      |  7     		Ca++ 1.11<L>  Ca 7.5<L>             ---------------------------------( 133<H>		Mg 1.2<L>             3.5     |  21<L>   |  0.63  			Ph 2.9     BMP (06-11 @ 07:22)             128<L>  |  94<L>   |  9     		Ca++ --      Ca 9.3                ---------------------------------( 83    		Mg 1.8                4.6     |  23      |  0.75  			Ph 3.2       LFTs (06-11 @ 07:22)      TPro 6.7 / Alb 3.3 / TBili 1.6<H> / DBili 0.3<H> / AST 15 / ALT 21 / AlkPhos 65    Coags (06-11 @ 07:22)  aPTT -- / INR 0.98 / PT 11.3      ABG (06-11 @ 21:26)     7.44 / 35 / 108 / 25 / 0.2 / 98.5%     Lactate:    ABG (06-11 @ 14:13)     7.35 / 44 / 262<H> / 23 / -1.1 / 99.8<H>%     Lactate:

## 2021-06-12 NOTE — PROGRESS NOTE ADULT - ASSESSMENT
60M who was transferred from an OSH for w/u of a laryngeal mass.  Initially underwent an awake trach w/ 6 LPC, now s/p mass removal, total laryngectomy, b/l neck dissection, right thyroidectomy, and pharyngeal reconstruction w/ left free ALT flap.    Neuro  - Tylenol and dilaudid PRN for pain  - Nicotine patch    Head/Neck  - S/p mass removal, total laryngectomy, b/l neck dissection, right thyroidectomy, and pharyngeal reconstruction w/ left free ALT flap  - Two drains in neck w/ serosanguinous fluids  - Q1 doppler checks w/ pencil doppler    Cardiovascular  - No active issues    Respiratory  - Open trach last week, 8 LPC  - Maintain SaO2 > 92%    GI  - NGT  - Bowel regimen w/ senna and miralax  - NPO    /Renal  - NS 75cc/hr (post op Na 130)  - Total and ionized calcium low, 2g Calcium Gluconate given  - Trend labs    ID  - On unasyn  - Trend WBC count, fever curve    Heme  - Hgb drop from 15.4 to 10.3  - Continue to monitor  - Lovenox for DVT PPX    Endo  - No active issues  - PTH 23  - Monitor BG  - Monitor calcium and electrolytes

## 2021-06-12 NOTE — PROGRESS NOTE ADULT - SUBJECTIVE AND OBJECTIVE BOX
Patient seen and examined overnight, no acute events.       T(C): 36.7 (06-12-21 @ 08:00), Max: 37.3 (06-11-21 @ 19:35)  HR: 82 (06-12-21 @ 11:00) (77 - 96)  BP: 125/68 (06-12-21 @ 08:00) (90/57 - 125/68)  RR: 17 (06-12-21 @ 11:00) (12 - 21)  SpO2: 99% (06-12-21 @ 11:00) (96% - 100%)  Alert, interactive  NGT in place  OC mucosa pink and moist  Neck with apron incision, staples in place, incision c/d/i, mild jeanette-incisional fullness, soft  Laryngectomy stoma with 8LPC sutured in place - cuff deflated with minimal secretions  Bilateral neck JPs, SS  Suture doppler site with good signal  L ALT incision with staples, c/d/i  KOREY with SS output                 A/P: 59yo M with larynx ca s/p TL, bl SND, reconstruction with L ALT and placement of TEP 6/11, recovering appropriately.   - humidified O2 via laryngectomy stoma  - trach collar sutures to stay in place for now  - routine trach care via laryngectomy stoma  - baci to incision  - JPs to bulb suction, monitor output  - asa/lov per prs  - flap checks per prs  - unasyn x24 hours vs while drains in place  - start NGT feeds in AM   - dc lackey  - OOB  - gayla per SICU  - please call ENT with questions

## 2021-06-13 LAB
ANION GAP SERPL CALC-SCNC: 11 MMOL/L — SIGNIFICANT CHANGE UP (ref 7–14)
BUN SERPL-MCNC: 5 MG/DL — LOW (ref 7–23)
CA-I BLD-SCNC: 1.1 MMOL/L — LOW (ref 1.15–1.29)
CALCIUM SERPL-MCNC: 9.1 MG/DL — SIGNIFICANT CHANGE UP (ref 8.4–10.5)
CHLORIDE SERPL-SCNC: 101 MMOL/L — SIGNIFICANT CHANGE UP (ref 98–107)
CO2 SERPL-SCNC: 19 MMOL/L — LOW (ref 22–31)
CREAT SERPL-MCNC: 0.63 MG/DL — SIGNIFICANT CHANGE UP (ref 0.5–1.3)
GLUCOSE SERPL-MCNC: 155 MG/DL — HIGH (ref 70–99)
HCT VFR BLD CALC: 31 % — LOW (ref 39–50)
HGB BLD-MCNC: 10.5 G/DL — LOW (ref 13–17)
MAGNESIUM SERPL-MCNC: 1.5 MG/DL — LOW (ref 1.6–2.6)
MCHC RBC-ENTMCNC: 33.3 PG — SIGNIFICANT CHANGE UP (ref 27–34)
MCHC RBC-ENTMCNC: 33.9 GM/DL — SIGNIFICANT CHANGE UP (ref 32–36)
MCV RBC AUTO: 98.4 FL — SIGNIFICANT CHANGE UP (ref 80–100)
NRBC # BLD: 0 /100 WBCS — SIGNIFICANT CHANGE UP
NRBC # FLD: 0 K/UL — SIGNIFICANT CHANGE UP
PHOSPHATE SERPL-MCNC: 3.5 MG/DL — SIGNIFICANT CHANGE UP (ref 2.5–4.5)
PLATELET # BLD AUTO: 133 K/UL — LOW (ref 150–400)
POTASSIUM SERPL-MCNC: 3.5 MMOL/L — SIGNIFICANT CHANGE UP (ref 3.5–5.3)
POTASSIUM SERPL-SCNC: 3.5 MMOL/L — SIGNIFICANT CHANGE UP (ref 3.5–5.3)
RBC # BLD: 3.15 M/UL — LOW (ref 4.2–5.8)
RBC # FLD: 11.8 % — SIGNIFICANT CHANGE UP (ref 10.3–14.5)
SODIUM SERPL-SCNC: 131 MMOL/L — LOW (ref 135–145)
WBC # BLD: 7.94 K/UL — SIGNIFICANT CHANGE UP (ref 3.8–10.5)
WBC # FLD AUTO: 7.94 K/UL — SIGNIFICANT CHANGE UP (ref 3.8–10.5)

## 2021-06-13 PROCEDURE — 99232 SBSQ HOSP IP/OBS MODERATE 35: CPT

## 2021-06-13 RX ORDER — POTASSIUM CHLORIDE 20 MEQ
40 PACKET (EA) ORAL ONCE
Refills: 0 | Status: COMPLETED | OUTPATIENT
Start: 2021-06-13 | End: 2021-06-13

## 2021-06-13 RX ORDER — CHLORHEXIDINE GLUCONATE 213 G/1000ML
1 SOLUTION TOPICAL DAILY
Refills: 0 | Status: DISCONTINUED | OUTPATIENT
Start: 2021-06-13 | End: 2021-06-14

## 2021-06-13 RX ORDER — POTASSIUM CHLORIDE 20 MEQ
40 PACKET (EA) ORAL ONCE
Refills: 0 | Status: DISCONTINUED | OUTPATIENT
Start: 2021-06-13 | End: 2021-06-13

## 2021-06-13 RX ORDER — MAGNESIUM SULFATE 500 MG/ML
2 VIAL (ML) INJECTION
Refills: 0 | Status: COMPLETED | OUTPATIENT
Start: 2021-06-13 | End: 2021-06-13

## 2021-06-13 RX ORDER — SODIUM CHLORIDE 9 MG/ML
3 INJECTION INTRAMUSCULAR; INTRAVENOUS; SUBCUTANEOUS EVERY 8 HOURS
Refills: 0 | Status: DISCONTINUED | OUTPATIENT
Start: 2021-06-13 | End: 2021-06-24

## 2021-06-13 RX ORDER — POTASSIUM CHLORIDE 20 MEQ
10 PACKET (EA) ORAL
Refills: 0 | Status: DISCONTINUED | OUTPATIENT
Start: 2021-06-13 | End: 2021-06-13

## 2021-06-13 RX ORDER — SODIUM CHLORIDE 9 MG/ML
250 INJECTION INTRAMUSCULAR; INTRAVENOUS; SUBCUTANEOUS ONCE
Refills: 0 | Status: COMPLETED | OUTPATIENT
Start: 2021-06-13 | End: 2021-06-13

## 2021-06-13 RX ADMIN — AMPICILLIN SODIUM AND SULBACTAM SODIUM 100 GRAM(S): 250; 125 INJECTION, POWDER, FOR SUSPENSION INTRAMUSCULAR; INTRAVENOUS at 02:28

## 2021-06-13 RX ADMIN — Medication 50 GRAM(S): at 04:18

## 2021-06-13 RX ADMIN — Medication 650 MILLIGRAM(S): at 05:27

## 2021-06-13 RX ADMIN — Medication 650 MILLIGRAM(S): at 20:00

## 2021-06-13 RX ADMIN — CHLORHEXIDINE GLUCONATE 1 APPLICATION(S): 213 SOLUTION TOPICAL at 06:00

## 2021-06-13 RX ADMIN — SODIUM CHLORIDE 3 MILLILITER(S): 9 INJECTION INTRAMUSCULAR; INTRAVENOUS; SUBCUTANEOUS at 22:00

## 2021-06-13 RX ADMIN — Medication 100 MILLIGRAM(S): at 11:46

## 2021-06-13 RX ADMIN — SODIUM CHLORIDE 500 MILLILITER(S): 9 INJECTION INTRAMUSCULAR; INTRAVENOUS; SUBCUTANEOUS at 00:37

## 2021-06-13 RX ADMIN — Medication 1 TABLET(S): at 11:46

## 2021-06-13 RX ADMIN — SENNA PLUS 2 TABLET(S): 8.6 TABLET ORAL at 21:08

## 2021-06-13 RX ADMIN — AMPICILLIN SODIUM AND SULBACTAM SODIUM 100 GRAM(S): 250; 125 INJECTION, POWDER, FOR SUSPENSION INTRAMUSCULAR; INTRAVENOUS at 08:47

## 2021-06-13 RX ADMIN — SODIUM CHLORIDE 100 MILLILITER(S): 9 INJECTION INTRAMUSCULAR; INTRAVENOUS; SUBCUTANEOUS at 07:44

## 2021-06-13 RX ADMIN — Medication 81 MILLIGRAM(S): at 11:48

## 2021-06-13 RX ADMIN — Medication 40 MILLIEQUIVALENT(S): at 04:17

## 2021-06-13 RX ADMIN — AMPICILLIN SODIUM AND SULBACTAM SODIUM 100 GRAM(S): 250; 125 INJECTION, POWDER, FOR SUSPENSION INTRAMUSCULAR; INTRAVENOUS at 21:07

## 2021-06-13 RX ADMIN — AMPICILLIN SODIUM AND SULBACTAM SODIUM 100 GRAM(S): 250; 125 INJECTION, POWDER, FOR SUSPENSION INTRAMUSCULAR; INTRAVENOUS at 14:22

## 2021-06-13 RX ADMIN — ENOXAPARIN SODIUM 40 MILLIGRAM(S): 100 INJECTION SUBCUTANEOUS at 11:47

## 2021-06-13 RX ADMIN — Medication 50 GRAM(S): at 02:27

## 2021-06-13 RX ADMIN — Medication 1 MILLIGRAM(S): at 11:46

## 2021-06-13 RX ADMIN — Medication 650 MILLIGRAM(S): at 04:18

## 2021-06-13 RX ADMIN — SODIUM CHLORIDE 3 MILLILITER(S): 9 INJECTION INTRAMUSCULAR; INTRAVENOUS; SUBCUTANEOUS at 13:00

## 2021-06-13 RX ADMIN — Medication 650 MILLIGRAM(S): at 21:00

## 2021-06-13 NOTE — PROGRESS NOTE ADULT - ASSESSMENT
ASSESSMENT/PLAN:   YUNIOR TANG is a 60yMale s/p s/p TL, bl SND, reconstruction with L ALT and placement of TEP 6/11, recovering appropriately.     - c/w Q1 hour flap checks  - KOREY drain care  - cont unasyn  - monitor thigh KOREY output  - OOB and activity as tolerated  - rest of care per ENT    Kimberlee Avila MD PGY1  Plastic Surgery   [facility]: [id number]  [facility]: [phone]

## 2021-06-13 NOTE — PHYSICAL THERAPY INITIAL EVALUATION ADULT - FOLLOWS COMMANDS/ANSWERS QUESTIONS, REHAB EVAL
100% of the time/able to follow multistep instructions/able to follow single-step instructions
100% of the time/able to follow multistep instructions

## 2021-06-13 NOTE — PHYSICAL THERAPY INITIAL EVALUATION ADULT - ORIENTATION, REHAB EVAL
trached , utilizing pen/paper for communication at times/unable to assess
oriented to person, place, time and situation

## 2021-06-13 NOTE — PROGRESS NOTE ADULT - SUBJECTIVE AND OBJECTIVE BOX
Patient seen and examined at the bedside.    No acute events. Passed TOV. Pain controlled, no complains.     T(C): 36.9 (06-13-21 @ 08:00), Max: 36.9 (06-13-21 @ 08:00)  HR: 72 (06-13-21 @ 10:00) (72 - 94)  BP: 113/59 (06-13-21 @ 08:00) (92/49 - 134/67)  RR: 14 (06-13-21 @ 10:00) (14 - 23)  SpO2: 100% (06-13-21 @ 10:00) (97% - 100%)    NAD, alert, awake  NGT in place  OC mucosa pink and moist  Neck with apron incision, staples in place, incision c/d/i, mild jeanette-incisional fullness, soft  Laryngectomy stoma with 8LPC sutured in place - cuff deflated with minimal secretions  Bilateral neck JPs, SS  Suture doppler site with good signal  L ALT incision with staples, c/d/i    WBC 7 stable  Ca 8.2 -> 9.1    A/P  60M w/ laryngeal ca s/p TL, bl SND, reconstruction with L ALT and placement of TEP 6/11 recovering well.  - humidified O2 via laryngectomy stoma  - trach collar sutures to stay in place for now  - routine trach care via laryngectomy stoma  - bacitracin to incision  - JPs to bulb suction, monitor output  - asa/lov per prs  - flap checks per prs  - unasyn  - NGT feeds  - PT, OOB  - gayla per SICU  - floor when ok with PRS  - please call ENT with questions

## 2021-06-13 NOTE — PROGRESS NOTE ADULT - ASSESSMENT
60M who was transferred from an OSH for w/u of a laryngeal mass.  Initially underwent an awake trach w/ 6 LPC, now s/p mass removal, total laryngectomy, b/l neck dissection, right thyroidectomy, and pharyngeal reconstruction w/ left free ALT flap.    Neuro  - Tylenol and dilaudid PRN for pain  - Nicotine patch    Head/Neck  - S/p mass removal, total laryngectomy, b/l neck dissection, right thyroidectomy, and pharyngeal reconstruction w/ left free ALT flap  - Two drains in neck w/ serosanguinous fluids  - Q1 doppler checks w/ pencil doppler    Cardiovascular  - No active issues    Respiratory  - Open trach last week, 8 LPC, now on trach collar  - Maintain SaO2 > 92%    GI  - started TFs via NGT  - Bowel regimen w/ senna and miralax      /Renal  - NS 75cc/hr (post op Na 130)  - NS 250cc bolus given for soft BP with good response  - Total and ionized calcium low, 2g Calcium Gluconate given  - Trend labs    ID  - On unasyn  - Trend WBC count, fever curve    Heme  - H&H stable  - Continue to monitor  - Lovenox for DVT PPX    Endo  - No active issues  - PTH 23  - Monitor BG  - Monitor calcium and electrolytes    Dispo  - SICU   60M who was transferred from an OSH for w/u of a laryngeal mass.  Initially underwent an awake trach w/ 6 LPC, now s/p mass removal, total laryngectomy, b/l neck dissection, right thyroidectomy, and pharyngeal reconstruction w/ left free ALT flap.    Neuro  - Tylenol and dilaudid PRN for pain    Head/Neck  - S/p mass removal, total laryngectomy, b/l neck dissection, right thyroidectomy, and pharyngeal reconstruction w/ left free ALT flap  - Two drains in neck w/ serosanguinous fluids  - Q2hour doppler checks w/ pencil doppler    Cardiovascular  - No active issues    Respiratory  - Open trach last week, 8 LPC, now on trach collar  - Maintain SaO2 > 92%    GI  - continue TFs via NGT  - Bowel regimen w/ senna and miralax      /Renal  - IVL  - NS 250cc bolus given for soft BP with good response    ID  - On unasyn  - Trend WBC count, fever curve    Heme  - H&H stable  - Continue to monitor  - Lovenox for DVT PPX    Endo  - No active issues  - PTH 23  - Monitor BG  - Monitor calcium and electrolytes    Dispo  - SICU

## 2021-06-13 NOTE — PHYSICAL THERAPY INITIAL EVALUATION ADULT - CRITERIA FOR SKILLED THERAPEUTIC INTERVENTIONS
impairments found/rehab potential/anticipated discharge recommendation
impairments found/rehab potential

## 2021-06-13 NOTE — PHYSICAL THERAPY INITIAL EVALUATION ADULT - PERTINENT HX OF CURRENT PROBLEM, REHAB EVAL
59 y/o Male with laryngeal ca s/p Thyroid lobectomy, bilateral Selective Neck Dissection, reconstruction with Left Anterolateral free flap and placement of Tracheoesophageal Puncture with insertion of voice prosthesis 6/11/2021
Pt. is a 60 year old male admitted to [facility] secondary to s/p open Tracheostomy. PMH: HTN, hypothyroidism, smoking and alcohol abuse, PSH perforated cecum s/p hemicolectomy and end ileostomy in 2018.

## 2021-06-13 NOTE — PHYSICAL THERAPY INITIAL EVALUATION ADULT - GENERAL OBSERVATIONS, REHAB EVAL
Pt received semi-li in bed, +KOREY drains x3, cardiac monitor, A-line, NG feeding tube, trach collar, NAD. Pt agreeable to PT consultation. Cleared for PT as per MICHELLE Rudolph
Pt. received in bed

## 2021-06-13 NOTE — PHYSICAL THERAPY INITIAL EVALUATION ADULT - ADDITIONAL COMMENTS
Pt. left comfortable in bed with all tubes/lines intact, call bell in reach and in NAD.
Pt lives at home alone in apartment. Pt was independent in functional activities prior to admission without use of assistive device.     Pt left seated in bedside chair, lines and tubes intact, NAD. RN present and aware of session. +call bell.

## 2021-06-13 NOTE — PHYSICAL THERAPY INITIAL EVALUATION ADULT - MANUAL MUSCLE TESTING RESULTS, REHAB EVAL
3/5 as pt is able to move through full ROM against gravity/grossly assessed due to
Bilateral UE muscle strength grossly 3/5 Throughout; Bilateral LE muscle strength grossly 3/5 Throughout.

## 2021-06-13 NOTE — PROGRESS NOTE ADULT - SUBJECTIVE AND OBJECTIVE BOX
SUBJECTIVE:  Doing well.   - Valdivia discontinued, passed TOV 300cc  - TF started   - Physical therapy ordered   - tolerating trach collar  - NS 250cc bolus given for soft BP with good response    OBJECTIVE:     ** VITAL SIGNS / I&O's **    T(C): 36.9 (06-13-21 @ 08:00), Max: 36.9 (06-13-21 @ 08:00)  T(F): 98.4 (06-13-21 @ 08:00), Max: 98.4 (06-13-21 @ 08:00)  HR: 76 (06-13-21 @ 09:00) (72 - 94)  BP: 113/59 (06-13-21 @ 08:00) (92/49 - 134/67)  ABP: 138/54 (06-13-21 @ 09:00) (84/31 - 142/50)  ABP(mean): 84 (06-13-21 @ 09:00) (48 - 115)  RR: 19 (06-13-21 @ 09:00) (15 - 23)  SpO2: 100% (06-13-21 @ 09:00) (97% - 100%)        12 Jun 2021 07:01  -  13 Jun 2021 07:00  --------------------------------------------------------  IN:    IV PiggyBack: 350 mL    Jevity: 1080 mL    sodium chloride 0.9%: 1650 mL  Total IN: 3080 mL    OUT:    Bulb (mL): 55 mL    Bulb (mL): 35 mL    Bulb (mL): 40 mL    Colostomy (mL): 680 mL    Indwelling Catheter - Urethral (mL): 400 mL    Voided (mL): 660 mL  Total OUT: 1870 mL    Total NET: 1210 mL          ** PHYSICAL EXAM **    -- CONSTITUTIONAL: NAD, laying in bed  -- HEENT: NGT in place, trach collar   -- FLAP: good signal with pencil doppler  -- NECK: incision cdi with staples, mild swelling, bilateral KOREY w SS output  -- RESPIRATORY: normal WOB  -- EXTREMITIES: L thigh incision cdi, KOREY SS    ** LABS **                  10.5   7.94   )----------(  133       ( 13 Jun 2021 01:26 )               31.0      131    |  101    |  5      ----------------------------<  155        ( 13 Jun 2021 01:26 )  3.5     |  19     |  0.63     Ca    9.1        ( 13 Jun 2021 01:26 )  Phos  3.5       ( 13 Jun 2021 01:26 )  Mg     1.5       ( 13 Jun 2021 01:26 )          CAPILLARY BLOOD GLUCOSE

## 2021-06-13 NOTE — PROGRESS NOTE ADULT - SUBJECTIVE AND OBJECTIVE BOX
SICU Daily Progress Note:    HPI: 60 y.o. Male with unknown PMHx (?stomach cancer, s/p colostomy tube 2018) who presents as a transfer from OSH for workup of a laryngeal mass. Per pt has been having SOB on exertion and laying flat, dysphonia for the past 4 weeks and it is progressing. Pt states he has a heavy history of smoking (1 ppd x 46 years) and alcohol abuse (10 16 oz beers x 19 years). Pt denies fevers/weight loss/chills/SOB. States he does not think he has trouble eating/denies dysphagia.    CT scan from OSH reviewed. Showing R laryngeal mass, likely subglottic extending to cricoid.  Had an awake trach. 6 LPC, on 6/4, and returned to OR on 6/11 for mass removal w/ total laryngectomy, b/l neck dissection, right thyroidectomy, pharynx reconstruction w/ left free ALT flap.  SICU consulted for q1 flap checks.    Overnight events:  - Valdivia discontinued, passed TOV 300cc  - TF started   - Physical therapy ordered   - tolerating trach collar  - NS 250cc bolus given for soft BP with good response    MEDICATIONS  (STANDING):  ampicillin/sulbactam  IVPB 1.5 Gram(s) IV Intermittent every 6 hours  aspirin  chewable 81 milliGRAM(s) Oral daily  enoxaparin Injectable 40 milliGRAM(s) SubCutaneous daily  folic acid 1 milliGRAM(s) Oral daily  magnesium sulfate  IVPB 2 Gram(s) IV Intermittent every 1 hour  multivitamin 1 Tablet(s) Oral daily  polyethylene glycol 3350 17 Gram(s) Oral daily  potassium chloride   Powder 40 milliEquivalent(s) Oral once  potassium chloride   Powder 40 milliEquivalent(s) Oral once  senna 2 Tablet(s) Oral at bedtime  sodium chloride 0.9%. 1000 milliLiter(s) (100 mL/Hr) IV Continuous <Continuous>  thiamine 100 milliGRAM(s) Oral daily    MEDICATIONS  (PRN):  acetaminophen    Suspension .. 650 milliGRAM(s) Oral every 6 hours PRN Mild Pain (1 - 3)  HYDROmorphone  Injectable 0.5 milliGRAM(s) IV Push every 4 hours PRN Moderate Pain (4 - 6)    ICU Vital Signs Last 24 Hrs  T(C): 36.8 (13 Jun 2021 00:00), Max: 36.8 (12 Jun 2021 20:00)  T(F): 98.3 (13 Jun 2021 00:00), Max: 98.3 (13 Jun 2021 00:00)  HR: 86 (13 Jun 2021 03:00) (72 - 94)  BP: 128/65 (13 Jun 2021 03:00) (92/49 - 128/65)  BP(mean): 81 (13 Jun 2021 03:00) (59 - 82)  ABP: 142/50 (13 Jun 2021 03:00) (84/31 - 142/50)  ABP(mean): 83 (13 Jun 2021 03:00) (48 - 115)  RR: 18 (13 Jun 2021 03:00) (15 - 23)  SpO2: 100% (13 Jun 2021 03:00) (96% - 100%)    I&O's Detail    11 Jun 2021 07:01  -  12 Jun 2021 07:00  --------------------------------------------------------  IN:    IV PiggyBack: 1000 mL    Lactated Ringers: 375 mL    Lactated Ringers Bolus: 500 mL    sodium chloride 0.9%: 700 mL  Total IN: 2575 mL    OUT:    Bulb (mL): 40 mL    Bulb (mL): 120 mL    Bulb (mL): 95 mL    Colostomy (mL): 100 mL    Indwelling Catheter - Urethral (mL): 1755 mL  Total OUT: 2110 mL    Total NET: 465 mL      12 Jun 2021 07:01  -  13 Jun 2021 03:43  --------------------------------------------------------  IN:    IV PiggyBack: 300 mL    Jevity: 885 mL    sodium chloride 0.9%: 1650 mL  Total IN: 2835 mL    OUT:    Bulb (mL): 20 mL    Bulb (mL): 15 mL    Bulb (mL): 25 mL    Indwelling Catheter - Urethral (mL): 400 mL    Voided (mL): 660 mL  Total OUT: 1120 mL    Total NET: 1715 mL        NEURO  Exam: awake, tracks, follows commands     RESPIRATORY  Exam: CTA b/l, no r/r/w, currently on trach collar    CARDIOVASCULAR:  Exam S1, S2 no m/r/g    GI/NUTRITION:  Exam: Abd soft, NT/ND, colostomy bag w/ brown stool.      PATIENT CARE ACCESS DEVICES:  [x] Peripheral IV  [ ] Central Venous Line	[ ] R	[ ] L	[ ] IJ	[ ] Fem	[ ] SC	Placed:   [ ] Arterial Line		[ ] R	[ ] L	[ ] Fem	[ ] Rad	[ ] Ax	Placed:   [ ] PICC:					[ ] Mediport    [ ] Urinary Catheter, Date Placed:   [x] Necessity of urinary, arterial, and venous catheters discussed    CBC (06-11 @ 20:48)                              10.3<L>                         6.87    )----------------(  135<L>     --    % Neutrophils, --    % Lymphocytes, ANC: --                                  29.3<L>  CBC (06-11 @ 07:22)                              15.4                           5.25    )----------------(  166        --    % Neutrophils, --    % Lymphocytes, ANC: --                                  45.5      BMP (06-11 @ 20:48)             130<L>  |  99      |  7     		Ca++ 1.11<L>  Ca 7.5<L>             ---------------------------------( 133<H>		Mg 1.2<L>             3.5     |  21<L>   |  0.63  			Ph 2.9     BMP (06-11 @ 07:22)             128<L>  |  94<L>   |  9     		Ca++ --      Ca 9.3                ---------------------------------( 83    		Mg 1.8                4.6     |  23      |  0.75  			Ph 3.2       LFTs (06-11 @ 07:22)      TPro 6.7 / Alb 3.3 / TBili 1.6<H> / DBili 0.3<H> / AST 15 / ALT 21 / AlkPhos 65    Coags (06-11 @ 07:22)  aPTT -- / INR 0.98 / PT 11.3      ABG (06-11 @ 21:26)     7.44 / 35 / 108 / 25 / 0.2 / 98.5%     Lactate:    ABG (06-11 @ 14:13)     7.35 / 44 / 262<H> / 23 / -1.1 / 99.8<H>%     Lactate:

## 2021-06-13 NOTE — PHYSICAL THERAPY INITIAL EVALUATION ADULT - PRECAUTIONS/LIMITATIONS, REHAB EVAL
aspiration precautions/oxygen therapy device and L/min/surgical precautions
IV, Tracheostomy,/aspiration precautions/oxygen therapy device and L/min/seizure precautions

## 2021-06-13 NOTE — PHYSICAL THERAPY INITIAL EVALUATION ADULT - IMPAIRMENTS CONTRIBUTING TO GAIT DEVIATIONS, PT EVAL
narrow base of support/decreased strength
impaired balance/impaired postural control/decreased strength

## 2021-06-13 NOTE — PHYSICAL THERAPY INITIAL EVALUATION ADULT - DISCHARGE DISPOSITION, PT EVAL
Anticipate no skilled PT needs upon discharge. Pt would benefit from PT in hospital to improve safety and functional mobility prior to discharge.
home w/ outpatient services

## 2021-06-14 LAB
ANION GAP SERPL CALC-SCNC: 11 MMOL/L — SIGNIFICANT CHANGE UP (ref 7–14)
BUN SERPL-MCNC: 7 MG/DL — SIGNIFICANT CHANGE UP (ref 7–23)
CA-I BLD-SCNC: 1.15 MMOL/L — SIGNIFICANT CHANGE UP (ref 1.15–1.29)
CALCIUM SERPL-MCNC: 9 MG/DL — SIGNIFICANT CHANGE UP (ref 8.4–10.5)
CHLORIDE SERPL-SCNC: 99 MMOL/L — SIGNIFICANT CHANGE UP (ref 98–107)
CO2 SERPL-SCNC: 22 MMOL/L — SIGNIFICANT CHANGE UP (ref 22–31)
CREAT SERPL-MCNC: 0.64 MG/DL — SIGNIFICANT CHANGE UP (ref 0.5–1.3)
GLUCOSE SERPL-MCNC: 112 MG/DL — HIGH (ref 70–99)
HCT VFR BLD CALC: 33 % — LOW (ref 39–50)
HGB BLD-MCNC: 11.3 G/DL — LOW (ref 13–17)
MAGNESIUM SERPL-MCNC: 1.5 MG/DL — LOW (ref 1.6–2.6)
MCHC RBC-ENTMCNC: 33.8 PG — SIGNIFICANT CHANGE UP (ref 27–34)
MCHC RBC-ENTMCNC: 34.2 GM/DL — SIGNIFICANT CHANGE UP (ref 32–36)
MCV RBC AUTO: 98.8 FL — SIGNIFICANT CHANGE UP (ref 80–100)
NRBC # BLD: 0 /100 WBCS — SIGNIFICANT CHANGE UP
NRBC # FLD: 0 K/UL — SIGNIFICANT CHANGE UP
PHOSPHATE SERPL-MCNC: 5 MG/DL — HIGH (ref 2.5–4.5)
PLATELET # BLD AUTO: 172 K/UL — SIGNIFICANT CHANGE UP (ref 150–400)
POTASSIUM SERPL-MCNC: 3.9 MMOL/L — SIGNIFICANT CHANGE UP (ref 3.5–5.3)
POTASSIUM SERPL-SCNC: 3.9 MMOL/L — SIGNIFICANT CHANGE UP (ref 3.5–5.3)
RBC # BLD: 3.34 M/UL — LOW (ref 4.2–5.8)
RBC # FLD: 11.9 % — SIGNIFICANT CHANGE UP (ref 10.3–14.5)
SODIUM SERPL-SCNC: 132 MMOL/L — LOW (ref 135–145)
WBC # BLD: 6.61 K/UL — SIGNIFICANT CHANGE UP (ref 3.8–10.5)
WBC # FLD AUTO: 6.61 K/UL — SIGNIFICANT CHANGE UP (ref 3.8–10.5)

## 2021-06-14 PROCEDURE — 99232 SBSQ HOSP IP/OBS MODERATE 35: CPT

## 2021-06-14 RX ORDER — MAGNESIUM SULFATE 500 MG/ML
2 VIAL (ML) INJECTION ONCE
Refills: 0 | Status: COMPLETED | OUTPATIENT
Start: 2021-06-14 | End: 2021-06-14

## 2021-06-14 RX ORDER — OXYCODONE HYDROCHLORIDE 5 MG/1
5 TABLET ORAL EVERY 4 HOURS
Refills: 0 | Status: DISCONTINUED | OUTPATIENT
Start: 2021-06-14 | End: 2021-06-18

## 2021-06-14 RX ORDER — POTASSIUM CHLORIDE 20 MEQ
40 PACKET (EA) ORAL ONCE
Refills: 0 | Status: COMPLETED | OUTPATIENT
Start: 2021-06-14 | End: 2021-06-14

## 2021-06-14 RX ADMIN — SODIUM CHLORIDE 3 MILLILITER(S): 9 INJECTION INTRAMUSCULAR; INTRAVENOUS; SUBCUTANEOUS at 15:16

## 2021-06-14 RX ADMIN — AMPICILLIN SODIUM AND SULBACTAM SODIUM 100 GRAM(S): 250; 125 INJECTION, POWDER, FOR SUSPENSION INTRAMUSCULAR; INTRAVENOUS at 23:36

## 2021-06-14 RX ADMIN — Medication 81 MILLIGRAM(S): at 11:23

## 2021-06-14 RX ADMIN — CHLORHEXIDINE GLUCONATE 1 APPLICATION(S): 213 SOLUTION TOPICAL at 11:23

## 2021-06-14 RX ADMIN — SODIUM CHLORIDE 3 MILLILITER(S): 9 INJECTION INTRAMUSCULAR; INTRAVENOUS; SUBCUTANEOUS at 23:38

## 2021-06-14 RX ADMIN — ENOXAPARIN SODIUM 40 MILLIGRAM(S): 100 INJECTION SUBCUTANEOUS at 11:22

## 2021-06-14 RX ADMIN — Medication 40 MILLIEQUIVALENT(S): at 05:52

## 2021-06-14 RX ADMIN — AMPICILLIN SODIUM AND SULBACTAM SODIUM 100 GRAM(S): 250; 125 INJECTION, POWDER, FOR SUSPENSION INTRAMUSCULAR; INTRAVENOUS at 10:13

## 2021-06-14 RX ADMIN — Medication 50 GRAM(S): at 05:52

## 2021-06-14 RX ADMIN — AMPICILLIN SODIUM AND SULBACTAM SODIUM 100 GRAM(S): 250; 125 INJECTION, POWDER, FOR SUSPENSION INTRAMUSCULAR; INTRAVENOUS at 02:45

## 2021-06-14 RX ADMIN — AMPICILLIN SODIUM AND SULBACTAM SODIUM 100 GRAM(S): 250; 125 INJECTION, POWDER, FOR SUSPENSION INTRAMUSCULAR; INTRAVENOUS at 15:22

## 2021-06-14 RX ADMIN — Medication 1 TABLET(S): at 11:23

## 2021-06-14 RX ADMIN — SODIUM CHLORIDE 3 MILLILITER(S): 9 INJECTION INTRAMUSCULAR; INTRAVENOUS; SUBCUTANEOUS at 06:41

## 2021-06-14 NOTE — PROGRESS NOTE ADULT - ATTENDING COMMENTS
Hyponatremia  a.  Trend Na  b.  On NSS at 50ml/hr    Agitative delirium  a Ativan prn    Respiratory abnormality  a.  Trach collar as tolerated  b.  Suction prn    At risk for malnutrition  a.  Dysphagia diet as tolerated
I agree with the above history, physical, and plan, which I have reviewed and edited where appropriate.  I agree with notes/assessment and detailed interval history of the health care providers on my service.  The patient is in SICU with diagnosis mentioned in the note.    The SICU team has a constant risk benefit analyzes discussion and coordinating care with the primary team, all consultants, House Staff and PA's.  I was physically present for the key portions of the evaluation and management (E/M) service provided.  60 y.o. male with right sided transglottic laryngeal mass, h/o ETOH abuse now s/p trach and biopsy in SICU for DTs  I have personally examined the patient, reviewed data and laboratory tests/x-rays and all pertinent electronic images.    NEURO: NAD, resting comfortably  HEENT: NC/AT, s/p trach  RESPIRATORY: clear  CARDIO: RRR, S1S2  ABDOMEN: soft, nontender, nondistended  EXTREMITIES: normal strength,    The plan is specified below:  Neurologic:   -No longer on ativan  Respiratory:   - Cuff deflated on 6/5 by ENT  Cardiovascular:   - no active issues  Gastrointestinal/Nutrition:   -Passed bedside swallow   Renal/Genitourinary:   -Monitoring hyponatremia on NS @ 75cc/hr  Hematologic:   - DVT ppx: Lovenox  Infectious Disease:  - no active issues  Endocrine:   -hx of hypothyroidism, states does not take synthroid at home.   -TSH wnl    Disposition: SICU
Patient started on tube feeds, q2 hour flap checks, no issues overnight. Patient requiring sicu for flap checks    I have personally interviewed when possible and examined the patient, reviewed data and laboratory tests/x-rays and all pertinent electronic images.  I was physically present for the key portions of the evaluation and management (E/M) service provided.   The SICU team has a constant risk benefit analyzes discussion with the primary team, all consultants, House Staff and PA's on all decisions.  These diagnoses are unrelated to the surgical procedure noted above.  I meet with family if needed to get further history, discuss the case and make care decisions for this patient who might not be able to participate.  Time involved in performance of separately billable procedures was not counted toward my critical care time. There is no overlap.  I spent 30 minutes ( 0800Hrs-0915Hrs in AM/ 1600Hrs-1715Hrs in PM, or other time indicated) of critical care time for the diagnoses, assessment, plan and interventions.  This time excludes time spent on separate procedures and teaching.
Respiratory abnormality  a.  Tolerating trach collar  b.  Suction q 4 and prn  c.  Patient is able to expectorate secretions    Hyponatremia  a.  Fluid restrict to 1l/day  b.  On NSS at 75  c.  Send urine lytes    Malnutrition  a.  Dysphagia diet as tolerated    Agitation  a.  No further sedation required overnight
I have personally interviewed and examined this patient, reviewed pertinent labs and imaging, and discussed the case with colleagues, residents, physician assistants, and nurses on SICU rounds.    40 min unrelated to the surgical procedure.  Additionally, time spent in the performance of separately billable procedures was not counted toward the critical care time.  There is no overlap.  More than 50% of this 80  50  35 minute encounter including face to face with the patient was spent counseling and/or coordination of care on (insert topic of counseling/coordination).  Time included review of vitals, labs, imaging, discussion with consultants and coordination with the operating room/emergency department.    The active critical care issues are:  1. severe hyponatremia, possibly chronic  2. malnutrition risk  3. alcoholism dependence and risk for withdrawal DTs  4. critical airway  5. question of hypothyroidism    Correct serum sodium slowly with normal saline infusion; may need to initiate hypertonic saline but at present responding appropriately and definitely need to avoid too rapid correction given neurostability.  Start thiamine.  CIWA and addiction psychiatry consultation.  Check TSH.
Patient s/p laryngectomy and trach. Patient doing well postoperatively. Patient to be resumed on tube feeds, flap checks, pain controlled, early moblization.    I have personally interviewed when possible and examined the patient, reviewed data and laboratory tests/x-rays and all pertinent electronic images.  I was physically present for the key portions of the evaluation and management (E/M) service provided.   The SICU team has a constant risk benefit analyzes discussion with the primary team, all consultants, House Staff and PA's on all decisions.  These diagnoses are unrelated to the surgical procedure noted above.  I meet with family if needed to get further history, discuss the case and make care decisions for this patient who might not be able to participate.  Time involved in performance of separately billable procedures was not counted toward my critical care time. There is no overlap.  I spent 30 minutes ( 0800Hrs-0915Hrs in AM/ 1600Hrs-1715Hrs in PM, or other time indicated) of critical care time for the diagnoses, assessment, plan and interventions.  This time excludes time spent on separate procedures and teaching.
Respiratory abnormality  a.  Trach collar as tolerated    Malnutrition  a.  TF to goal    Hypokalemia, magnesemia  a.  Replete K, Mg    Continue DVT prophylaxis  Unasyn per primary team

## 2021-06-14 NOTE — PROGRESS NOTE ADULT - SUBJECTIVE AND OBJECTIVE BOX
SICU Daily Progress Note:    24hr Events  - tolerating trach collar  - NS 250cc bolus given for soft BP with good response  - Arterial line discontinued   - Flap checks de-escalated to Q2    HPI: 60 y.o. Male with unknown PMHx (?stomach cancer, s/p colostomy tube 2018) who presents as a transfer from OSH for workup of a laryngeal mass. Per pt has been having SOB on exertion and laying flat, dysphonia for the past 4 weeks and it is progressing. Pt states he has a heavy history of smoking (1 ppd x 46 years) and alcohol abuse (10 16 oz beers x 19 years). Pt denies fevers/weight loss/chills/SOB. States he does not think he has trouble eating/denies dysphagia.    CT scan from OSH reviewed. Showing R laryngeal mass, likely subglottic extending to cricoid.  Had an awake trach. 6 LPC, on 6/4, and returned to OR on 6/11 for mass removal w/ total laryngectomy, b/l neck dissection, right thyroidectomy, pharynx reconstruction w/ left free ALT flap.  SICU consulted for q1 flap checks.    MEDICATIONS  (STANDING):  Neurologic Medications  acetaminophen    Suspension .. 650 milliGRAM(s) Oral every 6 hours PRN Mild Pain (1 - 3)  HYDROmorphone  Injectable 0.5 milliGRAM(s) IV Push every 4 hours PRN Moderate Pain (4 - 6)    Respiratory Medications    Cardiovascular Medications    Gastrointestinal Medications  folic acid 1 milliGRAM(s) Oral daily  multivitamin 1 Tablet(s) Oral daily  polyethylene glycol 3350 17 Gram(s) Oral daily  senna 2 Tablet(s) Oral at bedtime  sodium chloride 0.9% lock flush 3 milliLiter(s) IV Push every 8 hours  thiamine 100 milliGRAM(s) Oral daily    Genitourinary Medications    Hematologic/Oncologic Medications  aspirin  chewable 81 milliGRAM(s) Oral daily  enoxaparin Injectable 40 milliGRAM(s) SubCutaneous daily    Antimicrobial/Immunologic Medications  ampicillin/sulbactam  IVPB 1.5 Gram(s) IV Intermittent every 6 hours    Endocrine/Metabolic Medications    Topical/Other Medications  chlorhexidine 4% Liquid 1 Application(s) Topical daily    ICU Vital Signs Last 24 Hrs  T(C): 36.4 (06-13-21 @ 20:00), Max: 36.9 (06-13-21 @ 08:00)  HR: 76 (06-13-21 @ 23:00) (71 - 98)  BP: 103/55 (06-13-21 @ 23:00) (96/55 - 163/82)  BP(mean): 67 (06-13-21 @ 23:00) (65 - 102)  ABP: 163/77 (06-13-21 @ 13:00) (95/46 - 166/78)  ABP(mean): 109 (06-13-21 @ 13:00) (57 - 110)  RR: 13 (06-13-21 @ 23:00) (12 - 22)  SpO2: 100% (06-13-21 @ 23:00) (66% - 100%)  Wt(kg): --  CVP(mm Hg): --  CI: --  CAPILLARY BLOOD GLUCOSE       N/A      06-12 @ 07:01  -  06-13 @ 07:00  --------------------------------------------------------  IN:    IV PiggyBack: 350 mL    Jevity: 1080 mL    sodium chloride 0.9%: 1650 mL  Total IN: 3080 mL    OUT:    Bulb (mL): 55 mL    Bulb (mL): 35 mL    Bulb (mL): 40 mL    Colostomy (mL): 680 mL    Indwelling Catheter - Urethral (mL): 400 mL    Voided (mL): 660 mL  Total OUT: 1870 mL    Total NET: 1210 mL      06-13 @ 07:01  -  06-14 @ 00:31  --------------------------------------------------------  IN:    Enteral Tube Flush: 80 mL    IV PiggyBack: 100 mL    Jevity: 1040 mL    sodium chloride 0.9%: 200 mL  Total IN: 1420 mL    OUT:    Bulb (mL): 30 mL    Bulb (mL): 4 mL    Bulb (mL): 20 mL    Colostomy (mL): 725 mL    Voided (mL): 900 mL  Total OUT: 1679 mL    Total NET: -259 mL    Exam  NEURO  Exam: awake, tracks, follows commands     RESPIRATORY  Exam: CTA b/l, no r/r/w, currently on trach collar    CARDIOVASCULAR:  Exam S1, S2 no m/r/g    GI/NUTRITION:  Exam: Abd soft, NT/ND, colostomy bag w/ brown stool.      PATIENT CARE ACCESS DEVICES:  [x] Peripheral IV  [ ] Central Venous Line	[ ] R	[ ] L	[ ] IJ	[ ] Fem	[ ] SC	Placed:   [ ] Arterial Line		[ ] R	[ ] L	[ ] Fem	[ ] Rad	[ ] Ax	Placed:   [ ] PICC:					[ ] Mediport    [ ] Urinary Catheter, Date Placed:   [x] Necessity of urinary, arterial, and venous catheters discussed    Labs                                            10.5                  Neurophils% (auto):   x      (06-13 @ 01:26):    7.94 )-----------(133          Lymphocytes% (auto):  x                                             31.0                   Eosinphils% (auto):   x        Manual%: Neutrophils x    ; Lymphocytes x    ; Eosinophils x    ; Bands%: x    ; Blasts x          06-13    131<L>  |  101  |  5<L>  ----------------------------<  155<H>  3.5   |  19<L>  |  0.63    Ca    9.1      13 Jun 2021 01:26  Phos  3.5     06-13  Mg     1.5     06-13            RECENT CULTURES:

## 2021-06-14 NOTE — PROGRESS NOTE ADULT - ASSESSMENT
ASSESSMENT/PLAN:   YUNIOR TANG is a 60yMale s/p s/p TL, bl SND, reconstruction with L ALT and placement of TEP 6/11, recovering appropriately.     - q4 flap checks, ok for transfer to floor per Dr. Ch.   - KOREY drain care  - cont unasyn  - monitor thigh KOREY output  - OOB and activity as tolerated  - rest of care per ENT    David Taylor MD, PhD  Plastic Surgery Resident, PGY2  [facility]: [phone]  [facility]: [id number]  ASSESSMENT/PLAN:   YUNIOR TANG is a 60yMale s/p s/p TL, bl SND, reconstruction with L ALT and placement of TEP 6/11, recovering appropriately.     - q4 flap checks, ok for transfer to floor per Dr. Ch.   - KOREY drain care  - left neck KOREY removed   - cont unasyn  - monitor thigh KOREY output  - OOB and activity as tolerated  - rest of care per ENT    David Taylor MD, PhD  Plastic Surgery Resident, PGY2  [facility]: [phone]  [facility]: [id number]

## 2021-06-14 NOTE — DIETITIAN INITIAL EVALUATION ADULT. - OTHER INFO
Pt. w PMH laryngeal Ca, ETOH abuse, s/p total laryngectomy, b/l neck dissection, right thyroidectomy & pharyngeal reconstruction.    Receiving enteral feedings @ goal of 65mL/hr via NGT, which Pt. is currently tolerating (provides ~26.5 kcals/kg & ~1.2g protein/kg actual weight).  Advise changing TF formula to TwoCalHN & addition of NoCarb Prosource to help more adequately meet increased kcal/protein requirements for surgical wound healing.     Pt. unable to speak.  Communicate via writing.  Pt. denies food allergies, nausea/vomiting/diarrhea/constipation.

## 2021-06-14 NOTE — DIETITIAN INITIAL EVALUATION ADULT. - PERTINENT LABORATORY DATA
06-14    132<L>  |  99  |  7   ----------------------------<  112<H>  3.9   |  22  |  0.64    Ca    9.0      14 Jun 2021 04:26  Phos  5.0     06-14  Mg     1.5     06-14

## 2021-06-14 NOTE — PROGRESS NOTE ADULT - SUBJECTIVE AND OBJECTIVE BOX
SUBJECTIVE:    No acute overnight events. remains in SICU on NC.     OBJECTIVE:     ** VITAL SIGNS / I&O's **      T(C): 36.8 (06-14-21 @ 04:00), Max: 36.9 (06-13-21 @ 08:00)  T(F): 98.2 (06-14-21 @ 04:00), Max: 98.4 (06-13-21 @ 08:00)  HR: 78 (06-14-21 @ 06:00) (66 - 98)  BP: 121/69 (06-14-21 @ 06:00) (96/60 - 163/82)  RR: 11 (06-14-21 @ 06:00) (11 - 22)  SpO2: 100% (06-14-21 @ 06:00) (66% - 100%)      12 Jun 2021 07:01  -  13 Jun 2021 07:00  --------------------------------------------------------  IN:    IV PiggyBack: 350 mL    Jevity: 1080 mL    sodium chloride 0.9%: 1650 mL  Total IN: 3080 mL    OUT:    Bulb (mL): 55 mL    Bulb (mL): 35 mL    Bulb (mL): 40 mL    Colostomy (mL): 680 mL    Indwelling Catheter - Urethral (mL): 400 mL    Voided (mL): 660 mL  Total OUT: 1870 mL    Total NET: 1210 mL      13 Jun 2021 07:01  -  14 Jun 2021 06:50  --------------------------------------------------------  IN:    Enteral Tube Flush: 200 mL    IV PiggyBack: 300 mL    Jevity: 1560 mL    sodium chloride 0.9%: 200 mL  Total IN: 2260 mL    OUT:    Bulb (mL): 35 mL    Bulb (mL): 6 mL    Bulb (mL): 45 mL    Colostomy (mL): 1325 mL    Voided (mL): 800 mL  Total OUT: 2211 mL    Total NET: 49 mL          ** PHYSICAL EXAM **    -- CONSTITUTIONAL: NAD, laying in bed  -- HEENT: NGT in place, trach collar   -- FLAP: good signal with pencil doppler  -- NECK: incision cdi with staples, mild swelling, bilateral KOREY w SS output  -- RESPIRATORY: normal WOB  -- EXTREMITIES: L thigh incision cdi, KOREY SS    ** LABS **                              11.3   6.61   )----------(  172       ( 14 Jun 2021 04:26 )               33.0      132    |  99     |  7      ----------------------------<  112        ( 14 Jun 2021 04:26 )  3.9     |  22     |  0.64     Ca    9.0        ( 14 Jun 2021 04:26 )  Phos  5.0       ( 14 Jun 2021 04:26 )  Mg     1.5       ( 14 Jun 2021 04:26 )          CAPILLARY BLOOD GLUCOSE        CAPILLARY BLOOD GLUCOSE

## 2021-06-14 NOTE — DIETITIAN INITIAL EVALUATION ADULT. - ENTERAL
-D/C Jevity 1.2                                                           -Change enteral formula to TwoCalHN via NGT initiated @ 20mL/hr then increase, as tolerated by 10mL q 4hrs to goal of 45mL/hr continuously + 1 packet NoCarb Prosource per day                                                                        [provides 1080mL total volume, 2160 kcals, total of 105g protein, & 756mL free water, daily]                                 **gives ~31 kcals/kg ABW & 1.5g protein/kg ABW**

## 2021-06-14 NOTE — PROGRESS NOTE ADULT - ASSESSMENT
60M who was transferred from an OSH for w/u of a laryngeal mass.  Initially underwent an awake trach w/ 6 LPC, now s/p mass removal, total laryngectomy, b/l neck dissection, right thyroidectomy, and pharyngeal reconstruction w/ left free ALT flap.    Neuro  - Tylenol and dilaudid PRN for pain    Head/Neck  - S/p mass removal, total laryngectomy, b/l neck dissection, right thyroidectomy, and pharyngeal reconstruction w/ left free ALT flap  - Two drains in neck w/ serosanguinous fluids  - Q2hour doppler checks w/ pencil doppler    Cardiovascular  - No active issues    Respiratory  - Open trach last week, 8 LPC, now on trach collar  - Maintain SaO2 > 92%    GI  - continue TFs via NGT  - Bowel regimen w/ senna and miralax    /Renal  - IVL    ID  - On unasyn  - Trend WBC count, fever curve    Heme  - H&H stable  - Continue to monitor  - Lovenox for DVT PPX    Endo  - No active issues  - PTH 23  - Monitor BG  - Monitor calcium and electrolytes    Dispo  - SICU

## 2021-06-14 NOTE — DIETITIAN INITIAL EVALUATION ADULT. - PERTINENT MEDS FT
MEDICATIONS  (STANDING):  ampicillin/sulbactam  IVPB 1.5 Gram(s) IV Intermittent every 6 hours  aspirin  chewable 81 milliGRAM(s) Oral daily  chlorhexidine 4% Liquid 1 Application(s) Topical daily  enoxaparin Injectable 40 milliGRAM(s) SubCutaneous daily  folic acid 1 milliGRAM(s) Oral daily  multivitamin 1 Tablet(s) Oral daily  polyethylene glycol 3350 17 Gram(s) Oral daily  senna 2 Tablet(s) Oral at bedtime  sodium chloride 0.9% lock flush 3 milliLiter(s) IV Push every 8 hours  thiamine 100 milliGRAM(s) Oral daily

## 2021-06-14 NOTE — DIETITIAN INITIAL EVALUATION ADULT. - PROTEIN GMS
Dexa Scan order faxed to [facility]- fax # [phone] ( provided by Clara),confirmation received.   87

## 2021-06-15 LAB
ANION GAP SERPL CALC-SCNC: 11 MMOL/L — SIGNIFICANT CHANGE UP (ref 7–14)
BASOPHILS # BLD AUTO: 0.04 K/UL — SIGNIFICANT CHANGE UP (ref 0–0.2)
BASOPHILS NFR BLD AUTO: 0.6 % — SIGNIFICANT CHANGE UP (ref 0–2)
BUN SERPL-MCNC: 10 MG/DL — SIGNIFICANT CHANGE UP (ref 7–23)
CALCIUM SERPL-MCNC: 9 MG/DL — SIGNIFICANT CHANGE UP (ref 8.4–10.5)
CHLORIDE SERPL-SCNC: 97 MMOL/L — LOW (ref 98–107)
CO2 SERPL-SCNC: 24 MMOL/L — SIGNIFICANT CHANGE UP (ref 22–31)
CREAT SERPL-MCNC: 0.71 MG/DL — SIGNIFICANT CHANGE UP (ref 0.5–1.3)
EOSINOPHIL # BLD AUTO: 0.16 K/UL — SIGNIFICANT CHANGE UP (ref 0–0.5)
EOSINOPHIL NFR BLD AUTO: 2.4 % — SIGNIFICANT CHANGE UP (ref 0–6)
GLUCOSE SERPL-MCNC: 90 MG/DL — SIGNIFICANT CHANGE UP (ref 70–99)
HCT VFR BLD CALC: 36.5 % — LOW (ref 39–50)
HGB BLD-MCNC: 12.1 G/DL — LOW (ref 13–17)
IANC: 5.19 K/UL — SIGNIFICANT CHANGE UP (ref 1.5–8.5)
IMM GRANULOCYTES NFR BLD AUTO: 0.4 % — SIGNIFICANT CHANGE UP (ref 0–1.5)
LYMPHOCYTES # BLD AUTO: 0.77 K/UL — LOW (ref 1–3.3)
LYMPHOCYTES # BLD AUTO: 11.3 % — LOW (ref 13–44)
MAGNESIUM SERPL-MCNC: 1.6 MG/DL — SIGNIFICANT CHANGE UP (ref 1.6–2.6)
MCHC RBC-ENTMCNC: 33.2 GM/DL — SIGNIFICANT CHANGE UP (ref 32–36)
MCHC RBC-ENTMCNC: 33.4 PG — SIGNIFICANT CHANGE UP (ref 27–34)
MCV RBC AUTO: 100.8 FL — HIGH (ref 80–100)
MONOCYTES # BLD AUTO: 0.6 K/UL — SIGNIFICANT CHANGE UP (ref 0–0.9)
MONOCYTES NFR BLD AUTO: 8.8 % — SIGNIFICANT CHANGE UP (ref 2–14)
NEUTROPHILS # BLD AUTO: 5.19 K/UL — SIGNIFICANT CHANGE UP (ref 1.8–7.4)
NEUTROPHILS NFR BLD AUTO: 76.5 % — SIGNIFICANT CHANGE UP (ref 43–77)
NRBC # BLD: 0 /100 WBCS — SIGNIFICANT CHANGE UP
NRBC # FLD: 0 K/UL — SIGNIFICANT CHANGE UP
PHOSPHATE SERPL-MCNC: 5.1 MG/DL — HIGH (ref 2.5–4.5)
PLATELET # BLD AUTO: 204 K/UL — SIGNIFICANT CHANGE UP (ref 150–400)
POTASSIUM SERPL-MCNC: 4.2 MMOL/L — SIGNIFICANT CHANGE UP (ref 3.5–5.3)
POTASSIUM SERPL-SCNC: 4.2 MMOL/L — SIGNIFICANT CHANGE UP (ref 3.5–5.3)
RBC # BLD: 3.62 M/UL — LOW (ref 4.2–5.8)
RBC # FLD: 11.9 % — SIGNIFICANT CHANGE UP (ref 10.3–14.5)
SODIUM SERPL-SCNC: 132 MMOL/L — LOW (ref 135–145)
WBC # BLD: 6.79 K/UL — SIGNIFICANT CHANGE UP (ref 3.8–10.5)
WBC # FLD AUTO: 6.79 K/UL — SIGNIFICANT CHANGE UP (ref 3.8–10.5)

## 2021-06-15 PROCEDURE — 99233 SBSQ HOSP IP/OBS HIGH 50: CPT

## 2021-06-15 RX ORDER — MAGNESIUM SULFATE 500 MG/ML
2 VIAL (ML) INJECTION ONCE
Refills: 0 | Status: COMPLETED | OUTPATIENT
Start: 2021-06-15 | End: 2021-06-15

## 2021-06-15 RX ADMIN — Medication 50 GRAM(S): at 09:53

## 2021-06-15 RX ADMIN — Medication 650 MILLIGRAM(S): at 12:28

## 2021-06-15 RX ADMIN — POLYETHYLENE GLYCOL 3350 17 GRAM(S): 17 POWDER, FOR SOLUTION ORAL at 11:58

## 2021-06-15 RX ADMIN — Medication 650 MILLIGRAM(S): at 23:16

## 2021-06-15 RX ADMIN — Medication 650 MILLIGRAM(S): at 11:58

## 2021-06-15 RX ADMIN — SODIUM CHLORIDE 3 MILLILITER(S): 9 INJECTION INTRAMUSCULAR; INTRAVENOUS; SUBCUTANEOUS at 14:08

## 2021-06-15 RX ADMIN — SODIUM CHLORIDE 3 MILLILITER(S): 9 INJECTION INTRAMUSCULAR; INTRAVENOUS; SUBCUTANEOUS at 23:24

## 2021-06-15 RX ADMIN — Medication 1 TABLET(S): at 12:02

## 2021-06-15 RX ADMIN — Medication 650 MILLIGRAM(S): at 22:46

## 2021-06-15 RX ADMIN — AMPICILLIN SODIUM AND SULBACTAM SODIUM 100 GRAM(S): 250; 125 INJECTION, POWDER, FOR SUSPENSION INTRAMUSCULAR; INTRAVENOUS at 22:47

## 2021-06-15 RX ADMIN — AMPICILLIN SODIUM AND SULBACTAM SODIUM 100 GRAM(S): 250; 125 INJECTION, POWDER, FOR SUSPENSION INTRAMUSCULAR; INTRAVENOUS at 18:33

## 2021-06-15 RX ADMIN — ENOXAPARIN SODIUM 40 MILLIGRAM(S): 100 INJECTION SUBCUTANEOUS at 11:58

## 2021-06-15 RX ADMIN — AMPICILLIN SODIUM AND SULBACTAM SODIUM 100 GRAM(S): 250; 125 INJECTION, POWDER, FOR SUSPENSION INTRAMUSCULAR; INTRAVENOUS at 07:37

## 2021-06-15 RX ADMIN — SODIUM CHLORIDE 3 MILLILITER(S): 9 INJECTION INTRAMUSCULAR; INTRAVENOUS; SUBCUTANEOUS at 06:14

## 2021-06-15 RX ADMIN — Medication 81 MILLIGRAM(S): at 12:02

## 2021-06-15 RX ADMIN — AMPICILLIN SODIUM AND SULBACTAM SODIUM 100 GRAM(S): 250; 125 INJECTION, POWDER, FOR SUSPENSION INTRAMUSCULAR; INTRAVENOUS at 13:50

## 2021-06-15 NOTE — PROGRESS NOTE ADULT - SUBJECTIVE AND OBJECTIVE BOX
YUNIOR TANG  [id number]    Subjective:    Patient seen and examined on surgical floor. Flap checked with pencil doppler by PRS residents.   No complaints doing well.     Objective:  T(C): 36.8 (06-15-21 @ 02:23), Max: 36.9 (06-14-21 @ 08:00)  HR: 93 (06-15-21 @ 02:23) (74 - 93)  BP: 129/75 (06-15-21 @ 02:23) (125/63 - 143/85)  RR: 19 (06-15-21 @ 02:23) (14 - 19)  SpO2: 100% (06-15-21 @ 02:23) (99% - 100%)  Wt(kg): --   06-14    132<L>  |  99  |  7   ----------------------------<  112<H>  3.9   |  22  |  0.64    Ca    9.0      14 Jun 2021 04:26  Phos  5.0     06-14  Mg     1.5     06-14                          11.3   6.61  )-----------( 172      ( 14 Jun 2021 04:26 )             33.0       06-14 @ 07:01  -  06-15 @ 07:00  --------------------------------------------------------  IN: 1220 mL / OUT: 1396 mL / NET: -176 mL      PHYSICAL EXAM:    -- CONSTITUTIONAL: NAD, laying in bed  -- HEENT: NGT in place, trach collar   -- FLAP: good signal with pencil doppler  -- NECK: incision cdi with staples, mild swelling and mild erythema surrounding neck suture line. Bilateral KOREY w SS output  -- RESPIRATORY: normal WOB  -- EXTREMITIES: L thigh incision cdi, KOREY SS            MEDICATIONS  (STANDING):  ampicillin/sulbactam  IVPB 1.5 Gram(s) IV Intermittent every 6 hours  aspirin  chewable 81 milliGRAM(s) Oral daily  enoxaparin Injectable 40 milliGRAM(s) SubCutaneous daily  multivitamin 1 Tablet(s) Oral daily  polyethylene glycol 3350 17 Gram(s) Oral daily  senna 2 Tablet(s) Oral at bedtime  sodium chloride 0.9% lock flush 3 milliLiter(s) IV Push every 8 hours    MEDICATIONS  (PRN):  acetaminophen    Suspension .. 650 milliGRAM(s) Oral every 6 hours PRN Mild Pain (1 - 3)  oxyCODONE    Solution 5 milliGRAM(s) Oral every 4 hours PRN Moderate and Severe Pain

## 2021-06-15 NOTE — PROGRESS NOTE ADULT - SUBJECTIVE AND OBJECTIVE BOX
Patient seen and examined at the bedside.    6/14: A-line dc'ed. NAEON (flap checks q2 yesterday).  6/15: NAEON - pt on floor today. L neck KOREY removed. Flap appears healthy, strong signal.    ICU Vital Signs Last 24 Hrs  T(C): 36.8 (15 Isaac 2021 02:23), Max: 36.9 (14 Jun 2021 08:00)  T(F): 98.2 (15 Isaac 2021 02:23), Max: 98.4 (14 Jun 2021 08:00)  HR: 93 (15 Isaac 2021 02:23) (74 - 93)  BP: 129/75 (15 Isaac 2021 02:23) (114/60 - 143/85)  BP(mean): 82 (14 Jun 2021 16:00) (73 - 82)  ABP: --  ABP(mean): --  RR: 19 (15 Isaac 2021 02:23) (14 - 19)  SpO2: 100% (15 Isaac 2021 02:23) (99% - 100%)    NAD, alert, awake  NGt in place  OC mucosa pink and moist  Neck with apron incision, staples in place, incision c/d/i, mild jeanette-incisional fullness, soft  Laryngectomy stoma with 8LPC sutured in place - cuff deflated with minimal secretions  R neck KOREY x1, ss  Suture doppler site with good signal  L ALT incision with staples, c/d/i    WBC 7 stable  Ca 8.2 -> 9.1    A/P: 60M w/ laryngeal ca s/p TL, bl SND, reconstruction with L ALT and placement of TEP 6/11 recovering well.  - humidified O2 via laryngectomy stoma  - trach collar sutures to stay in place for now  - routine trach care via laryngectomy stoma  - bacitracin to incision  - JPs to bulb suction, monitor output  - asa/lov per prs  - flap checks per prs  - unasyn  - NGT feeds  - PT, OOB  - floor when ok with PRS  - please call ENT with questions

## 2021-06-15 NOTE — PROGRESS NOTE ADULT - ASSESSMENT
60M with history of HTN, hypothyroidism, tobacco and EtOH dependence, who presents as transfer for work-up right laryngeal mass. Recent history of hoarse voice since 1/2021, now with progressive SOB, found to have right laryngeal mass now s/p biopsy and trach, subsequent TL, bl SND, reconstruction with L ALT and placement of TEP 6/11.

## 2021-06-15 NOTE — PROGRESS NOTE ADULT - ASSESSMENT
ASSESSMENT/PLAN:   YUNIOR TANG is a 60yMale s/p s/p TL, bl SND, reconstruction with L ALT and placement of TEP 6/11, recovering appropriately.     - q4 flap checks with pencil doppler at suture on neck   - KOREY drain care  - cont unasyn  - monitor thigh KOREY output  - OOB and activity as tolerated      Plastic Surgery Resident  [facility]: [phone]  [facility]: [id number]

## 2021-06-15 NOTE — PROGRESS NOTE ADULT - SUBJECTIVE AND OBJECTIVE BOX
Patient is a 60y old  Male who presents with a chief complaint of Q1 Flap checks (14 Jun 2021 08:39)      SUBJECTIVE / OVERNIGHT EVENTS: Pt is doing well s/p SICU stay. Currently no complaint. Just wants to be able to take po.    MEDICATIONS  (STANDING):  ampicillin/sulbactam  IVPB 1.5 Gram(s) IV Intermittent every 6 hours  aspirin  chewable 81 milliGRAM(s) Oral daily  enoxaparin Injectable 40 milliGRAM(s) SubCutaneous daily  multivitamin 1 Tablet(s) Oral daily  polyethylene glycol 3350 17 Gram(s) Oral daily  senna 2 Tablet(s) Oral at bedtime  sodium chloride 0.9% lock flush 3 milliLiter(s) IV Push every 8 hours    MEDICATIONS  (PRN):  acetaminophen    Suspension .. 650 milliGRAM(s) Oral every 6 hours PRN Mild Pain (1 - 3)  oxyCODONE    Solution 5 milliGRAM(s) Oral every 4 hours PRN Moderate and Severe Pain      CAPILLARY BLOOD GLUCOSE        I&O's Summary    14 Jun 2021 07:01  -  15 Isaac 2021 07:00  --------------------------------------------------------  IN: 1770 mL / OUT: 2108 mL / NET: -338 mL        PHYSICAL EXAM:  Vital Signs Last 24 Hrs  T(C): 36.8 (15 Isaac 2021 09:35), Max: 36.8 (14 Jun 2021 12:00)  T(F): 98.2 (15 Isaac 2021 09:35), Max: 98.2 (14 Jun 2021 12:00)  HR: 75 (15 Isaac 2021 09:35) (71 - 93)  BP: 132/73 (15 Isaac 2021 09:35) (125/63 - 143/85)  BP(mean): 82 (14 Jun 2021 16:00) (79 - 82)  RR: 19 (15 Isaac 2021 09:35) (14 - 19)  SpO2: 98% (15 Isaac 2021 09:35) (98% - 100%)  CONSTITUTIONAL: NAD, well-developed, well-groomed  EYES: PERRLA; conjunctiva and sclera clear  ENMT: Moist oral mucosa, no pharyngeal injection or exudates; normal dentition  NECK: incision c/d/i, KOREY with serosanguinous drainage  RESPIRATORY: Normal respiratory effort; lungs are clear to auscultation bilaterally  CARDIOVASCULAR: Regular rate and rhythm, normal S1 and S2, no murmur/rub/gallop; No lower extremity edema; Peripheral pulses are 2+ bilaterally  ABDOMEN: Nontender to palpation, normoactive bowel sounds, no rebound/guarding; No hepatosplenomegaly    LABS:                        12.1   6.79  )-----------( 204      ( 15 Isaac 2021 07:15 )             36.5     06-15    132<L>  |  97<L>  |  10  ----------------------------<  90  4.2   |  24  |  0.71    Ca    9.0      15 Isaac 2021 07:15  Phos  5.1     06-15  Mg     1.6     06-15                  RADIOLOGY & ADDITIONAL TESTS:  Results Reviewed:   Imaging Personally Reviewed:  Electrocardiogram Personally Reviewed:    COORDINATION OF CARE:  Care Discussed with Consultants/Other Providers [Y/N]:  Prior or Outpatient Records Reviewed [Y/N]:

## 2021-06-16 LAB
ANION GAP SERPL CALC-SCNC: 14 MMOL/L — SIGNIFICANT CHANGE UP (ref 7–14)
BUN SERPL-MCNC: 10 MG/DL — SIGNIFICANT CHANGE UP (ref 7–23)
CALCIUM SERPL-MCNC: 9.1 MG/DL — SIGNIFICANT CHANGE UP (ref 8.4–10.5)
CHLORIDE SERPL-SCNC: 97 MMOL/L — LOW (ref 98–107)
CO2 SERPL-SCNC: 22 MMOL/L — SIGNIFICANT CHANGE UP (ref 22–31)
CREAT SERPL-MCNC: 0.72 MG/DL — SIGNIFICANT CHANGE UP (ref 0.5–1.3)
GLUCOSE SERPL-MCNC: 88 MG/DL — SIGNIFICANT CHANGE UP (ref 70–99)
HCT VFR BLD CALC: 36.3 % — LOW (ref 39–50)
HGB BLD-MCNC: 12.2 G/DL — LOW (ref 13–17)
MAGNESIUM SERPL-MCNC: 1.8 MG/DL — SIGNIFICANT CHANGE UP (ref 1.6–2.6)
MCHC RBC-ENTMCNC: 33.6 GM/DL — SIGNIFICANT CHANGE UP (ref 32–36)
MCHC RBC-ENTMCNC: 34 PG — SIGNIFICANT CHANGE UP (ref 27–34)
MCV RBC AUTO: 101.1 FL — HIGH (ref 80–100)
NRBC # BLD: 0 /100 WBCS — SIGNIFICANT CHANGE UP
NRBC # FLD: 0 K/UL — SIGNIFICANT CHANGE UP
PHOSPHATE SERPL-MCNC: 4.2 MG/DL — SIGNIFICANT CHANGE UP (ref 2.5–4.5)
PLATELET # BLD AUTO: 212 K/UL — SIGNIFICANT CHANGE UP (ref 150–400)
POTASSIUM SERPL-MCNC: 4.6 MMOL/L — SIGNIFICANT CHANGE UP (ref 3.5–5.3)
POTASSIUM SERPL-SCNC: 4.6 MMOL/L — SIGNIFICANT CHANGE UP (ref 3.5–5.3)
RBC # BLD: 3.59 M/UL — LOW (ref 4.2–5.8)
RBC # FLD: 11.7 % — SIGNIFICANT CHANGE UP (ref 10.3–14.5)
SODIUM SERPL-SCNC: 133 MMOL/L — LOW (ref 135–145)
WBC # BLD: 5.23 K/UL — SIGNIFICANT CHANGE UP (ref 3.8–10.5)
WBC # FLD AUTO: 5.23 K/UL — SIGNIFICANT CHANGE UP (ref 3.8–10.5)

## 2021-06-16 PROCEDURE — 99233 SBSQ HOSP IP/OBS HIGH 50: CPT

## 2021-06-16 RX ADMIN — Medication 81 MILLIGRAM(S): at 11:50

## 2021-06-16 RX ADMIN — AMPICILLIN SODIUM AND SULBACTAM SODIUM 100 GRAM(S): 250; 125 INJECTION, POWDER, FOR SUSPENSION INTRAMUSCULAR; INTRAVENOUS at 04:49

## 2021-06-16 RX ADMIN — Medication 650 MILLIGRAM(S): at 07:09

## 2021-06-16 RX ADMIN — Medication 1 TABLET(S): at 11:50

## 2021-06-16 RX ADMIN — AMPICILLIN SODIUM AND SULBACTAM SODIUM 100 GRAM(S): 250; 125 INJECTION, POWDER, FOR SUSPENSION INTRAMUSCULAR; INTRAVENOUS at 23:33

## 2021-06-16 RX ADMIN — AMPICILLIN SODIUM AND SULBACTAM SODIUM 100 GRAM(S): 250; 125 INJECTION, POWDER, FOR SUSPENSION INTRAMUSCULAR; INTRAVENOUS at 16:29

## 2021-06-16 RX ADMIN — SODIUM CHLORIDE 3 MILLILITER(S): 9 INJECTION INTRAMUSCULAR; INTRAVENOUS; SUBCUTANEOUS at 06:00

## 2021-06-16 RX ADMIN — POLYETHYLENE GLYCOL 3350 17 GRAM(S): 17 POWDER, FOR SOLUTION ORAL at 11:50

## 2021-06-16 RX ADMIN — ENOXAPARIN SODIUM 40 MILLIGRAM(S): 100 INJECTION SUBCUTANEOUS at 11:50

## 2021-06-16 RX ADMIN — SODIUM CHLORIDE 3 MILLILITER(S): 9 INJECTION INTRAMUSCULAR; INTRAVENOUS; SUBCUTANEOUS at 23:49

## 2021-06-16 RX ADMIN — AMPICILLIN SODIUM AND SULBACTAM SODIUM 100 GRAM(S): 250; 125 INJECTION, POWDER, FOR SUSPENSION INTRAMUSCULAR; INTRAVENOUS at 10:48

## 2021-06-16 RX ADMIN — Medication 650 MILLIGRAM(S): at 07:43

## 2021-06-16 RX ADMIN — SODIUM CHLORIDE 3 MILLILITER(S): 9 INJECTION INTRAMUSCULAR; INTRAVENOUS; SUBCUTANEOUS at 14:14

## 2021-06-16 NOTE — PROGRESS NOTE ADULT - SUBJECTIVE AND OBJECTIVE BOX
patient seen and examined, no acute events overnight.     T(C): 36.6 (06-16-21 @ 09:30), Max: 37.5 (06-15-21 @ 18:00)  HR: 75 (06-16-21 @ 09:30) (67 - 78)  BP: 137/81 (06-16-21 @ 09:30) (114/80 - 145/85)  RR: 18 (06-16-21 @ 09:30) (16 - 19)  SpO2: 100% (06-16-21 @ 09:30) (97% - 100%)  NAD, alert, awake  NGt in place  OC mucosa pink and moist  Neck with apron incision, staples in place, incision c/d/i, mild jeanette-incisional fullness, soft  Laryngectomy stoma with 8LPC sutured in place - cuff deflated with minimal secretions  R neck KOREY x1, ss  Suture doppler site with good signal  L ALT incision with staples, c/d/i    WBC 7 stable  Ca 8.2 -> 9.1    A/P: 60M w/ laryngeal ca s/p TL, bl SND, reconstruction with L ALT and placement of TEP 6/11 recovering well.  - humidified O2 via laryngectomy stoma  - dc KOREY today  - trach collar sutures to stay in place for now  - routine trach care via laryngectomy stoma  - bacitracin to incision  - JPs to bulb suction, monitor output  - asa/lov per prs  - flap checks per prs  - unasyn  - swallow POD7  - NGT feeds  - PT, OOB  - please call ENT with questions

## 2021-06-16 NOTE — PROGRESS NOTE ADULT - SUBJECTIVE AND OBJECTIVE BOX
Plastic Surgery Progress Note (pg [facility]: [id number], [facility]: [phone])    SUBJECTIVE  The patient was seen and examined. No acute events overnight.    OBJECTIVE  ___________________________________________________  VITAL SIGNS / I&O's   Vital Signs Last 24 Hrs  T(C): 36.8 (16 Jun 2021 05:50), Max: 37.5 (15 Isaac 2021 18:00)  T(F): 98.3 (16 Jun 2021 05:50), Max: 99.5 (15 Isaac 2021 18:00)  HR: 76 (16 Jun 2021 05:50) (67 - 78)  BP: 145/85 (16 Jun 2021 05:50) (114/80 - 145/85)  BP(mean): --  RR: 18 (16 Jun 2021 05:50) (16 - 19)  SpO2: 100% (16 Jun 2021 05:50) (97% - 100%)      14 Jun 2021 07:01  -  15 Isaac 2021 07:00  --------------------------------------------------------  IN:    Enteral Tube Flush: 240 mL    IV PiggyBack: 100 mL    Jevity: 1430 mL  Total IN: 1770 mL    OUT:    Bulb (mL): 34 mL    Bulb (mL): 24 mL    Colostomy (mL): 650 mL    Oral Fluid: 0 mL    Voided (mL): 1400 mL  Total OUT: 2108 mL    Total NET: -338 mL      15 Isaac 2021 07:01  -  16 Jun 2021 06:54  --------------------------------------------------------  IN:    Enteral Tube Flush: 900 mL    IV PiggyBack: 300 mL    Jevity: 1220 mL  Total IN: 2420 mL    OUT:    Bulb (mL): 20 mL    Bulb (mL): 25.5 mL    Colostomy (mL): 1600 mL    Oral Fluid: 0 mL    Voided (mL): 1075 mL  Total OUT: 2720.5 mL    Total NET: -300.5 mL        ___________________________________________________  PHYSICAL EXAM    -- CONSTITUTIONAL: NAD, laying in bed  -- HEENT: NGT in place, trach collar   -- FLAP: good signal with pencil doppler  -- NECK: incision cdi with staples, mild swelling and mild erythema surrounding neck suture line. Bilateral KOREY w SS output  -- RESPIRATORY: normal WOB  -- EXTREMITIES: L thigh incision cdi, KOREY SS    ___________________________________________________  LABS                        12.1   6.79  )-----------( 204      ( 15 Isaac 2021 07:15 )             36.5     15 Isaac 2021 07:15    132    |  97     |  10     ----------------------------<  90     4.2     |  24     |  0.71     Ca    9.0        15 Isaac 2021 07:15  Phos  5.1       15 Isaac 2021 07:15  Mg     1.6       15 Isaac 2021 07:15    ___________________________________________________  MEDICATIONS  (STANDING):  ampicillin/sulbactam  IVPB 1.5 Gram(s) IV Intermittent every 6 hours  aspirin  chewable 81 milliGRAM(s) Oral daily  enoxaparin Injectable 40 milliGRAM(s) SubCutaneous daily  multivitamin 1 Tablet(s) Oral daily  polyethylene glycol 3350 17 Gram(s) Oral daily  senna 2 Tablet(s) Oral at bedtime  sodium chloride 0.9% lock flush 3 milliLiter(s) IV Push every 8 hours    MEDICATIONS  (PRN):  acetaminophen    Suspension .. 650 milliGRAM(s) Oral every 6 hours PRN Mild Pain (1 - 3)  oxyCODONE    Solution 5 milliGRAM(s) Oral every 4 hours PRN Moderate and Severe Pain

## 2021-06-16 NOTE — PROGRESS NOTE ADULT - SUBJECTIVE AND OBJECTIVE BOX
Patient is a 60y old  Male who presents with a chief complaint of Q1 Flap checks (14 Jun 2021 08:39)      SUBJECTIVE / OVERNIGHT EVENTS: No overnight event. Feels fine. Again asking for po nutrition.    MEDICATIONS  (STANDING):  ampicillin/sulbactam  IVPB 1.5 Gram(s) IV Intermittent every 6 hours  aspirin  chewable 81 milliGRAM(s) Oral daily  enoxaparin Injectable 40 milliGRAM(s) SubCutaneous daily  multivitamin 1 Tablet(s) Oral daily  polyethylene glycol 3350 17 Gram(s) Oral daily  senna 2 Tablet(s) Oral at bedtime  sodium chloride 0.9% lock flush 3 milliLiter(s) IV Push every 8 hours    MEDICATIONS  (PRN):  acetaminophen    Suspension .. 650 milliGRAM(s) Oral every 6 hours PRN Mild Pain (1 - 3)  oxyCODONE    Solution 5 milliGRAM(s) Oral every 4 hours PRN Moderate and Severe Pain      CAPILLARY BLOOD GLUCOSE        I&O's Summary    15 Isaac 2021 07:01  -  16 Jun 2021 07:00  --------------------------------------------------------  IN: 2420 mL / OUT: 2720.5 mL / NET: -300.5 mL        PHYSICAL EXAM:  Vital Signs Last 24 Hrs  T(C): 36.6 (16 Jun 2021 09:30), Max: 37.5 (15 Isaac 2021 18:00)  T(F): 97.9 (16 Jun 2021 09:30), Max: 99.5 (15 Isaac 2021 18:00)  HR: 75 (16 Jun 2021 09:30) (67 - 78)  BP: 137/81 (16 Jun 2021 09:30) (114/80 - 145/85)  BP(mean): --  RR: 18 (16 Jun 2021 09:30) (16 - 19)  SpO2: 100% (16 Jun 2021 09:30) (97% - 100%)  CONSTITUTIONAL: NAD, well-developed, well-groomed  EYES: PERRLA; conjunctiva and sclera clear  ENMT: Moist oral mucosa, no pharyngeal injection or exudates; normal dentition  NECK: incision c/d/i  RESPIRATORY: Normal respiratory effort; lungs are clear to auscultation bilaterally  CARDIOVASCULAR: Regular rate and rhythm, normal S1 and S2, no murmur/rub/gallop; No lower extremity edema; Peripheral pulses are 2+ bilaterally  ABDOMEN: Nontender to palpation, normoactive bowel sounds, no rebound/guarding; No hepatosplenomegaly    LABS:                        12.2   5.23  )-----------( 212      ( 16 Jun 2021 07:54 )             36.3     06-16    133<L>  |  97<L>  |  10  ----------------------------<  88  4.6   |  22  |  0.72    Ca    9.1      16 Jun 2021 07:54  Phos  4.2     06-16  Mg     1.8     06-16                  RADIOLOGY & ADDITIONAL TESTS:  Results Reviewed:   Imaging Personally Reviewed:  Electrocardiogram Personally Reviewed:    COORDINATION OF CARE:  Care Discussed with Consultants/Other Providers [Y/N]:  Prior or Outpatient Records Reviewed [Y/N]:

## 2021-06-16 NOTE — PROGRESS NOTE ADULT - ASSESSMENT
ASSESSMENT/PLAN:   YUNIOR TANG is a 60yMale s/p s/p TL, bl SND, reconstruction with L ALT and placement of TEP 6/11, recovering appropriately.     - q4 flap checks with pencil doppler at suture on neck   - KOREY drain care  - cont unasyn  - monitor thigh KOREY output  - OOB and activity as tolerated      Plastic Surgery Resident  [facility]: [phone]  [facility]: [id number]

## 2021-06-17 LAB
ANION GAP SERPL CALC-SCNC: 11 MMOL/L — SIGNIFICANT CHANGE UP (ref 7–14)
BUN SERPL-MCNC: 11 MG/DL — SIGNIFICANT CHANGE UP (ref 7–23)
CALCIUM SERPL-MCNC: 9.3 MG/DL — SIGNIFICANT CHANGE UP (ref 8.4–10.5)
CHLORIDE SERPL-SCNC: 96 MMOL/L — LOW (ref 98–107)
CO2 SERPL-SCNC: 23 MMOL/L — SIGNIFICANT CHANGE UP (ref 22–31)
CREAT SERPL-MCNC: 0.69 MG/DL — SIGNIFICANT CHANGE UP (ref 0.5–1.3)
GLUCOSE SERPL-MCNC: 92 MG/DL — SIGNIFICANT CHANGE UP (ref 70–99)
HCT VFR BLD CALC: 35.1 % — LOW (ref 39–50)
HGB BLD-MCNC: 11.9 G/DL — LOW (ref 13–17)
MAGNESIUM SERPL-MCNC: 1.6 MG/DL — SIGNIFICANT CHANGE UP (ref 1.6–2.6)
MCHC RBC-ENTMCNC: 33.3 PG — SIGNIFICANT CHANGE UP (ref 27–34)
MCHC RBC-ENTMCNC: 33.9 GM/DL — SIGNIFICANT CHANGE UP (ref 32–36)
MCV RBC AUTO: 98.3 FL — SIGNIFICANT CHANGE UP (ref 80–100)
NRBC # BLD: 0 /100 WBCS — SIGNIFICANT CHANGE UP
NRBC # FLD: 0 K/UL — SIGNIFICANT CHANGE UP
PHOSPHATE SERPL-MCNC: 4.4 MG/DL — SIGNIFICANT CHANGE UP (ref 2.5–4.5)
PLATELET # BLD AUTO: 232 K/UL — SIGNIFICANT CHANGE UP (ref 150–400)
POTASSIUM SERPL-MCNC: 4.3 MMOL/L — SIGNIFICANT CHANGE UP (ref 3.5–5.3)
POTASSIUM SERPL-SCNC: 4.3 MMOL/L — SIGNIFICANT CHANGE UP (ref 3.5–5.3)
RBC # BLD: 3.57 M/UL — LOW (ref 4.2–5.8)
RBC # FLD: 11.7 % — SIGNIFICANT CHANGE UP (ref 10.3–14.5)
SODIUM SERPL-SCNC: 130 MMOL/L — LOW (ref 135–145)
SURGICAL PATHOLOGY STUDY: SIGNIFICANT CHANGE UP
WBC # BLD: 5.91 K/UL — SIGNIFICANT CHANGE UP (ref 3.8–10.5)
WBC # FLD AUTO: 5.91 K/UL — SIGNIFICANT CHANGE UP (ref 3.8–10.5)

## 2021-06-17 PROCEDURE — 99233 SBSQ HOSP IP/OBS HIGH 50: CPT

## 2021-06-17 RX ORDER — BACITRACIN ZINC 500 UNIT/G
1 OINTMENT IN PACKET (EA) TOPICAL
Refills: 0 | Status: DISCONTINUED | OUTPATIENT
Start: 2021-06-17 | End: 2021-06-24

## 2021-06-17 RX ORDER — MAGNESIUM SULFATE 500 MG/ML
1 VIAL (ML) INJECTION ONCE
Refills: 0 | Status: COMPLETED | OUTPATIENT
Start: 2021-06-17 | End: 2021-06-17

## 2021-06-17 RX ADMIN — SODIUM CHLORIDE 3 MILLILITER(S): 9 INJECTION INTRAMUSCULAR; INTRAVENOUS; SUBCUTANEOUS at 22:28

## 2021-06-17 RX ADMIN — Medication 100 GRAM(S): at 09:55

## 2021-06-17 RX ADMIN — AMPICILLIN SODIUM AND SULBACTAM SODIUM 100 GRAM(S): 250; 125 INJECTION, POWDER, FOR SUSPENSION INTRAMUSCULAR; INTRAVENOUS at 15:52

## 2021-06-17 RX ADMIN — Medication 81 MILLIGRAM(S): at 11:03

## 2021-06-17 RX ADMIN — Medication 1 APPLICATION(S): at 17:14

## 2021-06-17 RX ADMIN — Medication 1 TABLET(S): at 11:02

## 2021-06-17 RX ADMIN — AMPICILLIN SODIUM AND SULBACTAM SODIUM 100 GRAM(S): 250; 125 INJECTION, POWDER, FOR SUSPENSION INTRAMUSCULAR; INTRAVENOUS at 09:57

## 2021-06-17 RX ADMIN — SODIUM CHLORIDE 3 MILLILITER(S): 9 INJECTION INTRAMUSCULAR; INTRAVENOUS; SUBCUTANEOUS at 15:46

## 2021-06-17 RX ADMIN — ENOXAPARIN SODIUM 40 MILLIGRAM(S): 100 INJECTION SUBCUTANEOUS at 11:03

## 2021-06-17 RX ADMIN — AMPICILLIN SODIUM AND SULBACTAM SODIUM 100 GRAM(S): 250; 125 INJECTION, POWDER, FOR SUSPENSION INTRAMUSCULAR; INTRAVENOUS at 22:31

## 2021-06-17 RX ADMIN — AMPICILLIN SODIUM AND SULBACTAM SODIUM 100 GRAM(S): 250; 125 INJECTION, POWDER, FOR SUSPENSION INTRAMUSCULAR; INTRAVENOUS at 04:30

## 2021-06-17 RX ADMIN — SODIUM CHLORIDE 3 MILLILITER(S): 9 INJECTION INTRAMUSCULAR; INTRAVENOUS; SUBCUTANEOUS at 06:00

## 2021-06-17 NOTE — PROGRESS NOTE ADULT - SUBJECTIVE AND OBJECTIVE BOX
Plastic Surgery Progress Note (pg [facility]: [id number], [facility]: [phone])    SUBJECTIVE  The patient was seen and examined. No acute events overnight.    OBJECTIVE  ___________________________________________________  VITAL SIGNS / I&O's   Vital Signs Last 24 Hrs  T(C): 37.4 (17 Jun 2021 06:37), Max: 37.4 (17 Jun 2021 06:37)  T(F): 99.3 (17 Jun 2021 06:37), Max: 99.3 (17 Jun 2021 06:37)  HR: 78 (17 Jun 2021 06:37) (74 - 86)  BP: 123/76 (17 Jun 2021 06:37) (111/66 - 142/96)  BP(mean): --  RR: 17 (17 Jun 2021 06:37) (17 - 19)  SpO2: 99% (17 Jun 2021 06:37) (98% - 100%)      16 Jun 2021 07:01  -  17 Jun 2021 07:00  --------------------------------------------------------  IN:    Enteral Tube Flush: 900 mL    IV PiggyBack: 150 mL    Jevity: 780 mL    Oral Fluid: 400 mL  Total IN: 2230 mL    OUT:    Colostomy (mL): 900 mL    Voided (mL): 1870 mL  Total OUT: 2770 mL    Total NET: -540 mL        ___________________________________________________  PHYSICAL EXAM    -- CONSTITUTIONAL: NAD, laying in bed  -- HEENT: NGT in place, trach collar   -- FLAP: good signal with pencil doppler  -- NECK: incision cdi with staples, mild swelling and mild erythema surrounding neck suture line  -- RESPIRATORY: normal WOB  -- EXTREMITIES: L thigh incision cdi  ___________________________________________________  LABS                        11.9   5.91  )-----------( 232      ( 17 Jun 2021 07:07 )             35.1     16 Jun 2021 07:54    133    |  97     |  10     ----------------------------<  88     4.6     |  22     |  0.72     Ca    9.1        16 Jun 2021 07:54  Phos  4.2       16 Jun 2021 07:54  Mg     1.8       16 Jun 2021 07:54    ___________________________________________________  MEDICATIONS  (STANDING):  ampicillin/sulbactam  IVPB 1.5 Gram(s) IV Intermittent every 6 hours  aspirin  chewable 81 milliGRAM(s) Oral daily  enoxaparin Injectable 40 milliGRAM(s) SubCutaneous daily  multivitamin 1 Tablet(s) Oral daily  polyethylene glycol 3350 17 Gram(s) Oral daily  senna 2 Tablet(s) Oral at bedtime  sodium chloride 0.9% lock flush 3 milliLiter(s) IV Push every 8 hours    MEDICATIONS  (PRN):  acetaminophen    Suspension .. 650 milliGRAM(s) Oral every 6 hours PRN Mild Pain (1 - 3)  oxyCODONE    Solution 5 milliGRAM(s) Oral every 4 hours PRN Moderate and Severe Pain

## 2021-06-17 NOTE — PROGRESS NOTE ADULT - ASSESSMENT
ASSESSMENT/PLAN:   YUNIOR TANG is a 60yMale s/p s/p TL, bl SND, reconstruction with L ALT and placement of TEP 6/11, recovering appropriately.     - q4 flap checks with pencil doppler at suture on neck   - BID baci ointment to leg incision  - Drains removed yesterday  - cont unasyn  - OOB and activity as tolerated      Plastic Surgery Resident  [facility]: [phone]  [facility]: [id number]

## 2021-06-17 NOTE — PROGRESS NOTE ADULT - SUBJECTIVE AND OBJECTIVE BOX
Patient is a 60y old  Male who presents with a chief complaint of Q1 Flap checks (14 Jun 2021 08:39)      SUBJECTIVE / OVERNIGHT EVENTS: No acute event. Pt asking for po food.    MEDICATIONS  (STANDING):  ampicillin/sulbactam  IVPB 1.5 Gram(s) IV Intermittent every 6 hours  aspirin  chewable 81 milliGRAM(s) Oral daily  BACItracin   Ointment 1 Application(s) Topical two times a day  enoxaparin Injectable 40 milliGRAM(s) SubCutaneous daily  multivitamin 1 Tablet(s) Oral daily  polyethylene glycol 3350 17 Gram(s) Oral daily  senna 2 Tablet(s) Oral at bedtime  sodium chloride 0.9% lock flush 3 milliLiter(s) IV Push every 8 hours    MEDICATIONS  (PRN):  acetaminophen    Suspension .. 650 milliGRAM(s) Oral every 6 hours PRN Mild Pain (1 - 3)  oxyCODONE    Solution 5 milliGRAM(s) Oral every 4 hours PRN Moderate and Severe Pain      CAPILLARY BLOOD GLUCOSE        I&O's Summary    16 Jun 2021 07:01  -  17 Jun 2021 07:00  --------------------------------------------------------  IN: 3110 mL / OUT: 3270 mL / NET: -160 mL    17 Jun 2021 07:01  -  17 Jun 2021 10:01  --------------------------------------------------------  IN: 0 mL / OUT: 100 mL / NET: -100 mL        PHYSICAL EXAM:  Vital Signs Last 24 Hrs  T(C): 37.4 (17 Jun 2021 06:37), Max: 37.4 (17 Jun 2021 06:37)  T(F): 99.3 (17 Jun 2021 06:37), Max: 99.3 (17 Jun 2021 06:37)  HR: 78 (17 Jun 2021 06:37) (74 - 86)  BP: 123/76 (17 Jun 2021 06:37) (111/66 - 142/96)  BP(mean): --  RR: 17 (17 Jun 2021 06:37) (17 - 19)  SpO2: 99% (17 Jun 2021 06:37) (98% - 100%)  CONSTITUTIONAL: NAD, well-developed, well-groomed  EYES: PERRLA; conjunctiva and sclera clear  ENMT: Moist oral mucosa, no pharyngeal injection or exudates; normal dentition  NECK: incision c/d/i  RESPIRATORY: Normal respiratory effort; lungs are clear to auscultation bilaterally  CARDIOVASCULAR: Regular rate and rhythm, normal S1 and S2, no murmur/rub/gallop; No lower extremity edema; Peripheral pulses are 2+ bilaterally  ABDOMEN: Nontender to palpation, normoactive bowel sounds, no rebound/guarding; No hepatosplenomegaly      LABS:                        11.9   5.91  )-----------( 232      ( 17 Jun 2021 07:07 )             35.1     06-17    130<L>  |  96<L>  |  11  ----------------------------<  92  4.3   |  23  |  0.69    Ca    9.3      17 Jun 2021 07:07  Phos  4.4     06-17  Mg     1.6     06-17                  RADIOLOGY & ADDITIONAL TESTS:  Results Reviewed:   Imaging Personally Reviewed:  Electrocardiogram Personally Reviewed:    COORDINATION OF CARE:  Care Discussed with Consultants/Other Providers [Y/N]:  Prior or Outpatient Records Reviewed [Y/N]:

## 2021-06-17 NOTE — PROGRESS NOTE ADULT - SUBJECTIVE AND OBJECTIVE BOX
6/16: patient seen and examined, no acute events overnight.   6/17: NAEON. Plan for esophogram tomorrow    ICU Vital Signs Last 24 Hrs  T(C): 37.4 (17 Jun 2021 06:37), Max: 37.4 (17 Jun 2021 06:37)  T(F): 99.3 (17 Jun 2021 06:37), Max: 99.3 (17 Jun 2021 06:37)  HR: 78 (17 Jun 2021 06:37) (74 - 86)  BP: 123/76 (17 Jun 2021 06:37) (111/66 - 142/96)  BP(mean): --  ABP: --  ABP(mean): --  RR: 17 (17 Jun 2021 06:37) (17 - 19)  SpO2: 99% (17 Jun 2021 06:37) (98% - 100%)      NAD, alert, awake  NGt in place  OC mucosa pink and moist  Neck with apron incision, staples in place, incision c/d/i, mild jeanette-incisional fullness, soft  Laryngectomy stoma with 8LT  Suture doppler site with good signal  L ALT incision with staples, c/d/i    A/P: 60M w/ laryngeal ca s/p TL, bl SND, reconstruction with L ALT and placement of TEP 6/11 recovering well.  - humidified O2 via laryngectomy stoma  - routine larytube care  - bacitracin to incision  - asa/lov per prs  - flap checks per prs  - unasyn  - swallow POD7 (tomorrow)  - NGT feeds  - PT, OOB  - please call ENT with questions

## 2021-06-18 LAB
ANION GAP SERPL CALC-SCNC: 13 MMOL/L — SIGNIFICANT CHANGE UP (ref 7–14)
BUN SERPL-MCNC: 10 MG/DL — SIGNIFICANT CHANGE UP (ref 7–23)
CALCIUM SERPL-MCNC: 9.3 MG/DL — SIGNIFICANT CHANGE UP (ref 8.4–10.5)
CHLORIDE SERPL-SCNC: 97 MMOL/L — LOW (ref 98–107)
CO2 SERPL-SCNC: 22 MMOL/L — SIGNIFICANT CHANGE UP (ref 22–31)
CREAT SERPL-MCNC: 0.72 MG/DL — SIGNIFICANT CHANGE UP (ref 0.5–1.3)
GLUCOSE SERPL-MCNC: 106 MG/DL — HIGH (ref 70–99)
HCT VFR BLD CALC: 37.3 % — LOW (ref 39–50)
HGB BLD-MCNC: 12.7 G/DL — LOW (ref 13–17)
MAGNESIUM SERPL-MCNC: 1.7 MG/DL — SIGNIFICANT CHANGE UP (ref 1.6–2.6)
MCHC RBC-ENTMCNC: 33.6 PG — SIGNIFICANT CHANGE UP (ref 27–34)
MCHC RBC-ENTMCNC: 34 GM/DL — SIGNIFICANT CHANGE UP (ref 32–36)
MCV RBC AUTO: 98.7 FL — SIGNIFICANT CHANGE UP (ref 80–100)
NRBC # BLD: 0 /100 WBCS — SIGNIFICANT CHANGE UP
NRBC # FLD: 0 K/UL — SIGNIFICANT CHANGE UP
PHOSPHATE SERPL-MCNC: 3.9 MG/DL — SIGNIFICANT CHANGE UP (ref 2.5–4.5)
PLATELET # BLD AUTO: 269 K/UL — SIGNIFICANT CHANGE UP (ref 150–400)
POTASSIUM SERPL-MCNC: 4.1 MMOL/L — SIGNIFICANT CHANGE UP (ref 3.5–5.3)
POTASSIUM SERPL-SCNC: 4.1 MMOL/L — SIGNIFICANT CHANGE UP (ref 3.5–5.3)
RBC # BLD: 3.78 M/UL — LOW (ref 4.2–5.8)
RBC # FLD: 11.7 % — SIGNIFICANT CHANGE UP (ref 10.3–14.5)
SODIUM SERPL-SCNC: 132 MMOL/L — LOW (ref 135–145)
WBC # BLD: 5.9 K/UL — SIGNIFICANT CHANGE UP (ref 3.8–10.5)
WBC # FLD AUTO: 5.9 K/UL — SIGNIFICANT CHANGE UP (ref 3.8–10.5)

## 2021-06-18 PROCEDURE — 99233 SBSQ HOSP IP/OBS HIGH 50: CPT

## 2021-06-18 PROCEDURE — 74220 X-RAY XM ESOPHAGUS 1CNTRST: CPT | Mod: 26

## 2021-06-18 RX ORDER — OXYCODONE HYDROCHLORIDE 5 MG/1
5 TABLET ORAL EVERY 4 HOURS
Refills: 0 | Status: DISCONTINUED | OUTPATIENT
Start: 2021-06-18 | End: 2021-06-23

## 2021-06-18 RX ORDER — ACETAMINOPHEN 500 MG
650 TABLET ORAL EVERY 6 HOURS
Refills: 0 | Status: DISCONTINUED | OUTPATIENT
Start: 2021-06-18 | End: 2021-06-24

## 2021-06-18 RX ORDER — MAGNESIUM SULFATE 500 MG/ML
1 VIAL (ML) INJECTION ONCE
Refills: 0 | Status: COMPLETED | OUTPATIENT
Start: 2021-06-18 | End: 2021-06-18

## 2021-06-18 RX ORDER — ONDANSETRON 8 MG/1
4 TABLET, FILM COATED ORAL EVERY 8 HOURS
Refills: 0 | Status: DISCONTINUED | OUTPATIENT
Start: 2021-06-18 | End: 2021-06-24

## 2021-06-18 RX ADMIN — Medication 1 TABLET(S): at 11:13

## 2021-06-18 RX ADMIN — Medication 81 MILLIGRAM(S): at 11:13

## 2021-06-18 RX ADMIN — AMPICILLIN SODIUM AND SULBACTAM SODIUM 100 GRAM(S): 250; 125 INJECTION, POWDER, FOR SUSPENSION INTRAMUSCULAR; INTRAVENOUS at 22:33

## 2021-06-18 RX ADMIN — Medication 100 GRAM(S): at 13:15

## 2021-06-18 RX ADMIN — SODIUM CHLORIDE 3 MILLILITER(S): 9 INJECTION INTRAMUSCULAR; INTRAVENOUS; SUBCUTANEOUS at 21:18

## 2021-06-18 RX ADMIN — AMPICILLIN SODIUM AND SULBACTAM SODIUM 100 GRAM(S): 250; 125 INJECTION, POWDER, FOR SUSPENSION INTRAMUSCULAR; INTRAVENOUS at 04:39

## 2021-06-18 RX ADMIN — Medication 1 APPLICATION(S): at 06:29

## 2021-06-18 RX ADMIN — SODIUM CHLORIDE 3 MILLILITER(S): 9 INJECTION INTRAMUSCULAR; INTRAVENOUS; SUBCUTANEOUS at 12:54

## 2021-06-18 RX ADMIN — Medication 650 MILLIGRAM(S): at 16:48

## 2021-06-18 RX ADMIN — ENOXAPARIN SODIUM 40 MILLIGRAM(S): 100 INJECTION SUBCUTANEOUS at 11:13

## 2021-06-18 RX ADMIN — ONDANSETRON 4 MILLIGRAM(S): 8 TABLET, FILM COATED ORAL at 15:33

## 2021-06-18 RX ADMIN — SODIUM CHLORIDE 3 MILLILITER(S): 9 INJECTION INTRAMUSCULAR; INTRAVENOUS; SUBCUTANEOUS at 08:28

## 2021-06-18 RX ADMIN — Medication 1 APPLICATION(S): at 16:51

## 2021-06-18 RX ADMIN — SENNA PLUS 2 TABLET(S): 8.6 TABLET ORAL at 22:32

## 2021-06-18 RX ADMIN — AMPICILLIN SODIUM AND SULBACTAM SODIUM 100 GRAM(S): 250; 125 INJECTION, POWDER, FOR SUSPENSION INTRAMUSCULAR; INTRAVENOUS at 11:12

## 2021-06-18 RX ADMIN — Medication 1 TABLET(S): at 22:33

## 2021-06-18 RX ADMIN — Medication 650 MILLIGRAM(S): at 17:48

## 2021-06-18 RX ADMIN — AMPICILLIN SODIUM AND SULBACTAM SODIUM 100 GRAM(S): 250; 125 INJECTION, POWDER, FOR SUSPENSION INTRAMUSCULAR; INTRAVENOUS at 16:50

## 2021-06-18 NOTE — PROGRESS NOTE ADULT - SUBJECTIVE AND OBJECTIVE BOX
Patient is a 60y old  Male who presents with a chief complaint of Q1 Flap checks (14 Jun 2021 08:39)      SUBJECTIVE / OVERNIGHT EVENTS: Pt feeling well. Again asking for po diet.    MEDICATIONS  (STANDING):  ampicillin/sulbactam  IVPB 1.5 Gram(s) IV Intermittent every 6 hours  aspirin  chewable 81 milliGRAM(s) Oral daily  BACItracin   Ointment 1 Application(s) Topical two times a day  enoxaparin Injectable 40 milliGRAM(s) SubCutaneous daily  multivitamin 1 Tablet(s) Oral daily  polyethylene glycol 3350 17 Gram(s) Oral daily  senna 2 Tablet(s) Oral at bedtime  sodium chloride 0.9% lock flush 3 milliLiter(s) IV Push every 8 hours    MEDICATIONS  (PRN):  acetaminophen    Suspension .. 650 milliGRAM(s) Oral every 6 hours PRN Mild Pain (1 - 3)  oxyCODONE    Solution 5 milliGRAM(s) Oral every 4 hours PRN Moderate and Severe Pain      CAPILLARY BLOOD GLUCOSE        I&O's Summary    17 Jun 2021 07:01  -  18 Jun 2021 07:00  --------------------------------------------------------  IN: 2270 mL / OUT: 2550 mL / NET: -280 mL    18 Jun 2021 07:01  -  18 Jun 2021 10:37  --------------------------------------------------------  IN: 690 mL / OUT: 300 mL / NET: 390 mL        PHYSICAL EXAM:  Vital Signs Last 24 Hrs  T(C): 36.8 (18 Jun 2021 09:30), Max: 36.8 (18 Jun 2021 09:30)  T(F): 98.2 (18 Jun 2021 09:30), Max: 98.2 (18 Jun 2021 09:30)  HR: 76 (18 Jun 2021 09:30) (76 - 88)  BP: 132/82 (18 Jun 2021 09:30) (106/86 - 134/78)  BP(mean): --  RR: 17 (18 Jun 2021 09:30) (16 - 18)  SpO2: 100% (18 Jun 2021 09:30) (98% - 100%)  CONSTITUTIONAL: NAD, well-developed, well-groomed  EYES: PERRLA; conjunctiva and sclera clear  ENMT: Moist oral mucosa, no pharyngeal injection or exudates; normal dentition  NECK: incision c/d/i  RESPIRATORY: Normal respiratory effort; lungs are clear to auscultation bilaterally  CARDIOVASCULAR: Regular rate and rhythm, normal S1 and S2, no murmur/rub/gallop; No lower extremity edema; Peripheral pulses are 2+ bilaterally  ABDOMEN: Nontender to palpation, normoactive bowel sounds, no rebound/guarding; No hepatosplenomegaly    LABS:                        12.7   5.90  )-----------( 269      ( 18 Jun 2021 07:36 )             37.3     06-18    132<L>  |  97<L>  |  10  ----------------------------<  106<H>  4.1   |  22  |  0.72    Ca    9.3      18 Jun 2021 07:36  Phos  3.9     06-18  Mg     1.7     06-18                  RADIOLOGY & ADDITIONAL TESTS:  Results Reviewed:   Imaging Personally Reviewed:  Electrocardiogram Personally Reviewed:    COORDINATION OF CARE:  Care Discussed with Consultants/Other Providers [Y/N]:  Prior or Outpatient Records Reviewed [Y/N]:

## 2021-06-18 NOTE — PROGRESS NOTE ADULT - ASSESSMENT
ASSESSMENT/PLAN:   YUNIOR TANG is a 60yMale s/p s/p TL, bl SND, reconstruction with L ALT and placement of TEP 6/11, recovering appropriately.     - q4 flap checks with pencil doppler at suture on neck   - BID baci ointment to leg incision  - Drains removed yesterday  - cont unasyn  - OOB and activity as tolerated      Plastic Surgery Resident  [facility]: [phone]  [facility]: [id number]

## 2021-06-18 NOTE — SPEECH LANGUAGE PATHOLOGY EVALUATION - SLP DIAGNOSIS
Patient is s/p Total Laryngectomy on 5/25/2020. Patient is with a Shiley Tracheostomy in place (#8 LPC) on Trach Collar status. Patient seen at bedside, alert and oriented state. Patient utilizing mouthing, hand gestures and pen/paper to communicate thoughts/needs at this time.  Reviewed speech and swallow changes associated with total laryngectomy and answered patient’s questions.  Patient demonstrated understanding (e.g. Thumbs up) for utilizing alternative methods of communication given Laryngectomee state. Patient was also provided with a communication/alphabet board during hospital stay as patient is already utilizing mouthing, gestures and pen/paper form of communication with medical staff.
pt introduced to communicative aids inclusive of electrolarynx, communication board and smart phone device applications ([id number]). pt states disinterest in use of electrolarynx despite clinical demonstration, though interested in use of smart phone application. SLP assisted in downloading [id number] application with pt and instructed pt on use. pt demonstrated the ability to text needs/wants/medical concerns into text box and play aloud for clinician independently. pt provided with communication board at bedside as well to further assist in communicative function. pt will continue to be followed for TEP management by this service upon discharge from [facility].

## 2021-06-18 NOTE — PROGRESS NOTE ADULT - SUBJECTIVE AND OBJECTIVE BOX
6/16: patient seen and examined, no acute events overnight.   6/17: NAEON. Plan for esophogram tomorrow  6/18: NAEON. Esophogram today    ICU Vital Signs Last 24 Hrs  T(C): 36.2 (18 Jun 2021 06:22), Max: 36.9 (17 Jun 2021 10:12)  T(F): 97.2 (18 Jun 2021 06:22), Max: 98.5 (17 Jun 2021 10:12)  HR: 76 (18 Jun 2021 06:22) (76 - 88)  BP: 112/77 (18 Jun 2021 06:22) (106/86 - 134/78)  BP(mean): --  ABP: --  ABP(mean): --  RR: 17 (18 Jun 2021 06:22) (16 - 18)  SpO2: 98% (18 Jun 2021 06:22) (97% - 100%)      NAD, alert, awake  NGt in place  OC mucosa pink and moist  Neck with apron incision, staples in place, incision c/d/i, mild jeanette-incisional fullness, soft  Laryngectomy stoma with 8LT  Suture doppler site with good signal  L ALT incision with staples, c/d/i    A/P: 60M w/ laryngeal ca s/p TL, bl SND, reconstruction with L ALT and placement of TEP 6/11 recovering well.  - esophogram today  - humidified O2 via laryngectomy stoma  - routine larytube care  - bacitracin to incision  - asa/lov per prs  - flap checks per prs  - unasyn  - NGT feeds  - PT, OOB  - please call ENT with questions

## 2021-06-18 NOTE — PROGRESS NOTE ADULT - SUBJECTIVE AND OBJECTIVE BOX
Plastic Surgery Progress Note (pg [facility]: [id number], [facility]: [phone])    SUBJECTIVE  The patient was seen and examined. No acute events overnight.    OBJECTIVE  ___________________________________________________  VITAL SIGNS / I&O's   Vital Signs Last 24 Hrs  T(C): 36.2 (18 Jun 2021 06:22), Max: 36.9 (17 Jun 2021 10:12)  T(F): 97.2 (18 Jun 2021 06:22), Max: 98.5 (17 Jun 2021 10:12)  HR: 76 (18 Jun 2021 06:22) (76 - 88)  BP: 112/77 (18 Jun 2021 06:22) (106/86 - 134/78)  BP(mean): --  RR: 17 (18 Jun 2021 06:22) (16 - 18)  SpO2: 98% (18 Jun 2021 06:22) (97% - 100%)      17 Jun 2021 07:01  -  18 Jun 2021 07:00  --------------------------------------------------------  IN:    Enteral Tube Flush: 900 mL    IV PiggyBack: 200 mL    Jevity: 780 mL  Total IN: 1880 mL    OUT:    Colostomy (mL): 750 mL    Oral Fluid: 0 mL    Voided (mL): 900 mL  Total OUT: 1650 mL    Total NET: 230 mL        ___________________________________________________  PHYSICAL EXAM    -- CONSTITUTIONAL: NAD, laying in bed  -- HEENT: NGT in place, trach collar   -- FLAP: good signal with pencil doppler  -- NECK: incision cdi with staples, mild swelling and mild erythema surrounding neck suture line  -- RESPIRATORY: normal WOB  -- EXTREMITIES: L thigh incision cdi    ___________________________________________________  LABS                        11.9   5.91  )-----------( 232      ( 17 Jun 2021 07:07 )             35.1     17 Jun 2021 07:07    130    |  96     |  11     ----------------------------<  92     4.3     |  23     |  0.69     Ca    9.3        17 Jun 2021 07:07  Phos  4.4       17 Jun 2021 07:07  Mg     1.6       17 Jun 2021 07:07      ___________________________________________________  MEDICATIONS  (STANDING):  ampicillin/sulbactam  IVPB 1.5 Gram(s) IV Intermittent every 6 hours  aspirin  chewable 81 milliGRAM(s) Oral daily  BACItracin   Ointment 1 Application(s) Topical two times a day  enoxaparin Injectable 40 milliGRAM(s) SubCutaneous daily  multivitamin 1 Tablet(s) Oral daily  polyethylene glycol 3350 17 Gram(s) Oral daily  senna 2 Tablet(s) Oral at bedtime  sodium chloride 0.9% lock flush 3 milliLiter(s) IV Push every 8 hours    MEDICATIONS  (PRN):  acetaminophen    Suspension .. 650 milliGRAM(s) Oral every 6 hours PRN Mild Pain (1 - 3)  oxyCODONE    Solution 5 milliGRAM(s) Oral every 4 hours PRN Moderate and Severe Pain

## 2021-06-19 LAB
ANION GAP SERPL CALC-SCNC: 13 MMOL/L — SIGNIFICANT CHANGE UP (ref 7–14)
BUN SERPL-MCNC: 8 MG/DL — SIGNIFICANT CHANGE UP (ref 7–23)
CALCIUM SERPL-MCNC: 9.1 MG/DL — SIGNIFICANT CHANGE UP (ref 8.4–10.5)
CHLORIDE SERPL-SCNC: 97 MMOL/L — LOW (ref 98–107)
CO2 SERPL-SCNC: 23 MMOL/L — SIGNIFICANT CHANGE UP (ref 22–31)
CREAT SERPL-MCNC: 0.72 MG/DL — SIGNIFICANT CHANGE UP (ref 0.5–1.3)
GLUCOSE SERPL-MCNC: 88 MG/DL — SIGNIFICANT CHANGE UP (ref 70–99)
HCT VFR BLD CALC: 37.7 % — LOW (ref 39–50)
HGB BLD-MCNC: 12.6 G/DL — LOW (ref 13–17)
MAGNESIUM SERPL-MCNC: 1.8 MG/DL — SIGNIFICANT CHANGE UP (ref 1.6–2.6)
MCHC RBC-ENTMCNC: 33.2 PG — SIGNIFICANT CHANGE UP (ref 27–34)
MCHC RBC-ENTMCNC: 33.4 GM/DL — SIGNIFICANT CHANGE UP (ref 32–36)
MCV RBC AUTO: 99.2 FL — SIGNIFICANT CHANGE UP (ref 80–100)
NRBC # BLD: 0 /100 WBCS — SIGNIFICANT CHANGE UP
NRBC # FLD: 0 K/UL — SIGNIFICANT CHANGE UP
PHOSPHATE SERPL-MCNC: 3.7 MG/DL — SIGNIFICANT CHANGE UP (ref 2.5–4.5)
PLATELET # BLD AUTO: 286 K/UL — SIGNIFICANT CHANGE UP (ref 150–400)
POTASSIUM SERPL-MCNC: 4.1 MMOL/L — SIGNIFICANT CHANGE UP (ref 3.5–5.3)
POTASSIUM SERPL-SCNC: 4.1 MMOL/L — SIGNIFICANT CHANGE UP (ref 3.5–5.3)
RBC # BLD: 3.8 M/UL — LOW (ref 4.2–5.8)
RBC # FLD: 11.8 % — SIGNIFICANT CHANGE UP (ref 10.3–14.5)
SODIUM SERPL-SCNC: 133 MMOL/L — LOW (ref 135–145)
WBC # BLD: 5.48 K/UL — SIGNIFICANT CHANGE UP (ref 3.8–10.5)
WBC # FLD AUTO: 5.48 K/UL — SIGNIFICANT CHANGE UP (ref 3.8–10.5)

## 2021-06-19 PROCEDURE — 99232 SBSQ HOSP IP/OBS MODERATE 35: CPT

## 2021-06-19 RX ORDER — MAGNESIUM SULFATE 500 MG/ML
2 VIAL (ML) INJECTION ONCE
Refills: 0 | Status: COMPLETED | OUTPATIENT
Start: 2021-06-19 | End: 2021-06-19

## 2021-06-19 RX ADMIN — ENOXAPARIN SODIUM 40 MILLIGRAM(S): 100 INJECTION SUBCUTANEOUS at 11:18

## 2021-06-19 RX ADMIN — Medication 81 MILLIGRAM(S): at 11:19

## 2021-06-19 RX ADMIN — Medication 50 GRAM(S): at 11:18

## 2021-06-19 RX ADMIN — AMPICILLIN SODIUM AND SULBACTAM SODIUM 100 GRAM(S): 250; 125 INJECTION, POWDER, FOR SUSPENSION INTRAMUSCULAR; INTRAVENOUS at 03:46

## 2021-06-19 RX ADMIN — Medication 1 APPLICATION(S): at 06:07

## 2021-06-19 RX ADMIN — SODIUM CHLORIDE 3 MILLILITER(S): 9 INJECTION INTRAMUSCULAR; INTRAVENOUS; SUBCUTANEOUS at 06:07

## 2021-06-19 RX ADMIN — Medication 1 TABLET(S): at 11:19

## 2021-06-19 RX ADMIN — SODIUM CHLORIDE 3 MILLILITER(S): 9 INJECTION INTRAMUSCULAR; INTRAVENOUS; SUBCUTANEOUS at 13:08

## 2021-06-19 RX ADMIN — SODIUM CHLORIDE 3 MILLILITER(S): 9 INJECTION INTRAMUSCULAR; INTRAVENOUS; SUBCUTANEOUS at 22:45

## 2021-06-19 RX ADMIN — Medication 1 APPLICATION(S): at 17:28

## 2021-06-19 NOTE — PROGRESS NOTE ADULT - ASSESSMENT
ASSESSMENT/PLAN:   YUNIOR TANG is a 60yMale s/p s/p TL, bl SND, reconstruction with L ALT and placement of TEP 6/11, recovering appropriately.     - q4 flap checks with pencil doppler at suture on neck   - BID baci ointment to leg incision  - cont unasyn  - OOB and activity as tolerated      Plastic Surgery Resident  [facility]: [phone]  [facility]: [id number]

## 2021-06-19 NOTE — PROGRESS NOTE ADULT - SUBJECTIVE AND OBJECTIVE BOX
6/16: patient seen and examined, no acute events overnight.   6/17: NAEON. Plan for esophogram tomorrow  6/18: NAEON. Esophogram today  6/19 passed esophagram. tolerating diet    NAD, alert, awake  NGt in place  OC mucosa pink and moist  Neck with apron incision, staples in place, incision c/d/i, mild jeanette-incisional fullness, soft  Laryngectomy stoma with 8LT  Suture doppler site with good signal  L ALT incision with staples, c/d/i    A/P: 60M w/ laryngeal ca s/p TL, bl SND, reconstruction with L ALT and placement of TEP 6/11 recovering well.  - humidified O2 via laryngectomy stoma  - routine larytube care  - bacitracin to incision  - asa/lov per prs  - flap checks per prs  - cld  - PT, OOB  - please call ENT with questions

## 2021-06-19 NOTE — PROGRESS NOTE ADULT - SUBJECTIVE AND OBJECTIVE BOX
Patient is a 60y old  Male who presents with a chief complaint of Q1 Flap checks (14 Jun 2021 08:39)      SUBJECTIVE / OVERNIGHT EVENTS:    No events overnight. This AM, patient without n/v/d/cp/sob.      MEDICATIONS  (STANDING):  aspirin  chewable 81 milliGRAM(s) Oral daily  BACItracin   Ointment 1 Application(s) Topical two times a day  enoxaparin Injectable 40 milliGRAM(s) SubCutaneous daily  magnesium sulfate  IVPB 2 Gram(s) IV Intermittent once  multivitamin 1 Tablet(s) Oral daily  polyethylene glycol 3350 17 Gram(s) Oral daily  senna 2 Tablet(s) Oral at bedtime  sodium chloride 0.9% lock flush 3 milliLiter(s) IV Push every 8 hours    MEDICATIONS  (PRN):  acetaminophen    Suspension .. 650 milliGRAM(s) Oral every 6 hours PRN Mild Pain (1 - 3)  ondansetron    Tablet 4 milliGRAM(s) Oral every 8 hours PRN Nausea and/or Vomiting  oxyCODONE    Solution 5 milliGRAM(s) Oral every 4 hours PRN Moderate and Severe Pain      PHYSICAL EXAM:  T(C): 36.8 (06-19-21 @ 10:36), Max: 36.9 (06-18-21 @ 17:45)  HR: 75 (06-19-21 @ 10:36) (72 - 82)  BP: 136/78 (06-19-21 @ 10:36) (126/92 - 137/87)  RR: 18 (06-19-21 @ 10:36) (16 - 18)  SpO2: 100% (06-19-21 @ 10:36) (98% - 100%)  I&O's Summary    18 Jun 2021 07:01  -  19 Jun 2021 07:00  --------------------------------------------------------  IN: 1170 mL / OUT: 3025 mL / NET: -1855 mL      GENERAL: NAD, well-developed  HEAD:  Atraumatic, Normocephalic, MMM; neck incision c/d/i  CHEST/LUNG: No use of accessory muscles, CTAB, breathing non-labored  COR: RR, no mrcg  ABD: Soft, ND/NT, +BS  PSYCH: AAOx3  NEUROLOGY: CN II-XII grossly intact, moving all extremities  SKIN: No rashes or lesions  EXT: wwp, no cce    LABS:  CAPILLARY BLOOD GLUCOSE                              12.6   5.48  )-----------( 286      ( 19 Jun 2021 08:23 )             37.7     06-19    133<L>  |  97<L>  |  8   ----------------------------<  88  4.1   |  23  |  0.72    Ca    9.1      19 Jun 2021 08:23  Phos  3.7     06-19  Mg     1.8     06-19                  RADIOLOGY & ADDITIONAL TESTS:    Telemetry Personally Reviewed -     Imaging Personally Reviewed -     Imaging Reviewed -     Consultant(s) Notes Reviewed -       Care Discussed with Consultants/Other Providers -

## 2021-06-20 LAB
ANION GAP SERPL CALC-SCNC: 12 MMOL/L — SIGNIFICANT CHANGE UP (ref 7–14)
BUN SERPL-MCNC: 5 MG/DL — LOW (ref 7–23)
CALCIUM SERPL-MCNC: 9 MG/DL — SIGNIFICANT CHANGE UP (ref 8.4–10.5)
CHLORIDE SERPL-SCNC: 98 MMOL/L — SIGNIFICANT CHANGE UP (ref 98–107)
CO2 SERPL-SCNC: 22 MMOL/L — SIGNIFICANT CHANGE UP (ref 22–31)
CREAT SERPL-MCNC: 0.66 MG/DL — SIGNIFICANT CHANGE UP (ref 0.5–1.3)
GLUCOSE SERPL-MCNC: 85 MG/DL — SIGNIFICANT CHANGE UP (ref 70–99)
HCT VFR BLD CALC: 37 % — LOW (ref 39–50)
HGB BLD-MCNC: 12.6 G/DL — LOW (ref 13–17)
MAGNESIUM SERPL-MCNC: 1.9 MG/DL — SIGNIFICANT CHANGE UP (ref 1.6–2.6)
MCHC RBC-ENTMCNC: 33 PG — SIGNIFICANT CHANGE UP (ref 27–34)
MCHC RBC-ENTMCNC: 34.1 GM/DL — SIGNIFICANT CHANGE UP (ref 32–36)
MCV RBC AUTO: 96.9 FL — SIGNIFICANT CHANGE UP (ref 80–100)
NRBC # BLD: 0 /100 WBCS — SIGNIFICANT CHANGE UP
NRBC # FLD: 0 K/UL — SIGNIFICANT CHANGE UP
PHOSPHATE SERPL-MCNC: 3.2 MG/DL — SIGNIFICANT CHANGE UP (ref 2.5–4.5)
PLATELET # BLD AUTO: 287 K/UL — SIGNIFICANT CHANGE UP (ref 150–400)
POTASSIUM SERPL-MCNC: 4.1 MMOL/L — SIGNIFICANT CHANGE UP (ref 3.5–5.3)
POTASSIUM SERPL-SCNC: 4.1 MMOL/L — SIGNIFICANT CHANGE UP (ref 3.5–5.3)
RBC # BLD: 3.82 M/UL — LOW (ref 4.2–5.8)
RBC # FLD: 11.8 % — SIGNIFICANT CHANGE UP (ref 10.3–14.5)
SODIUM SERPL-SCNC: 132 MMOL/L — LOW (ref 135–145)
WBC # BLD: 4.54 K/UL — SIGNIFICANT CHANGE UP (ref 3.8–10.5)
WBC # FLD AUTO: 4.54 K/UL — SIGNIFICANT CHANGE UP (ref 3.8–10.5)

## 2021-06-20 PROCEDURE — 99232 SBSQ HOSP IP/OBS MODERATE 35: CPT

## 2021-06-20 RX ORDER — MAGNESIUM SULFATE 500 MG/ML
2 VIAL (ML) INJECTION ONCE
Refills: 0 | Status: COMPLETED | OUTPATIENT
Start: 2021-06-20 | End: 2021-06-20

## 2021-06-20 RX ADMIN — SODIUM CHLORIDE 3 MILLILITER(S): 9 INJECTION INTRAMUSCULAR; INTRAVENOUS; SUBCUTANEOUS at 22:00

## 2021-06-20 RX ADMIN — SODIUM CHLORIDE 3 MILLILITER(S): 9 INJECTION INTRAMUSCULAR; INTRAVENOUS; SUBCUTANEOUS at 13:46

## 2021-06-20 RX ADMIN — SODIUM CHLORIDE 3 MILLILITER(S): 9 INJECTION INTRAMUSCULAR; INTRAVENOUS; SUBCUTANEOUS at 05:00

## 2021-06-20 RX ADMIN — Medication 50 GRAM(S): at 07:40

## 2021-06-20 RX ADMIN — Medication 1 TABLET(S): at 12:10

## 2021-06-20 RX ADMIN — Medication 1 APPLICATION(S): at 05:00

## 2021-06-20 RX ADMIN — Medication 81 MILLIGRAM(S): at 12:10

## 2021-06-20 RX ADMIN — ENOXAPARIN SODIUM 40 MILLIGRAM(S): 100 INJECTION SUBCUTANEOUS at 12:10

## 2021-06-20 RX ADMIN — Medication 1 APPLICATION(S): at 18:05

## 2021-06-20 NOTE — PROGRESS NOTE ADULT - SUBJECTIVE AND OBJECTIVE BOX
ENT Progress Note    Interval:  yesterday neck staples removed  No issues overnight  Tolerating diet well, will advance to soft     Vital Signs Last 24 Hrs  T(C): 36.7 (20 Jun 2021 05:00), Max: 36.8 (19 Jun 2021 10:36)  T(F): 98.1 (20 Jun 2021 05:00), Max: 98.3 (19 Jun 2021 10:36)  HR: 71 (20 Jun 2021 05:00) (68 - 82)  BP: 137/89 (20 Jun 2021 05:00) (121/73 - 146/84)  BP(mean): --  RR: 16 (20 Jun 2021 05:00) (16 - 20)  SpO2: 100% (20 Jun 2021 05:00) (99% - 100%)    Exam  NAD, alert, awake  OC mucosa pink and moist  Neck with apron incision well healing  Laryngectomy stoma with 8LT  Suture doppler site with good signal  L ALT incision with staples, c/d/i    A/P: 60M w/ laryngeal ca s/p TL, bl SND, reconstruction with L ALT and placement of TEP 6/11     - soft diet  - humidified O2 via laryngectomy stoma  - routine larytube care  - bacitracin to incision  - asa/lov per prs  - flap checks per prs  - PT, OOB  - please call ENT with questions

## 2021-06-20 NOTE — PROGRESS NOTE ADULT - SUBJECTIVE AND OBJECTIVE BOX
Patient is a 60y old  Male who presents with a chief complaint of Q1 Flap checks (14 Jun 2021 08:39)      SUBJECTIVE / OVERNIGHT EVENTS:    No events overnight. This AM, patient without n/v/d/cp/sob.      MEDICATIONS  (STANDING):  aspirin  chewable 81 milliGRAM(s) Oral daily  BACItracin   Ointment 1 Application(s) Topical two times a day  enoxaparin Injectable 40 milliGRAM(s) SubCutaneous daily  multivitamin 1 Tablet(s) Oral daily  polyethylene glycol 3350 17 Gram(s) Oral daily  senna 2 Tablet(s) Oral at bedtime  sodium chloride 0.9% lock flush 3 milliLiter(s) IV Push every 8 hours    MEDICATIONS  (PRN):  acetaminophen    Suspension .. 650 milliGRAM(s) Oral every 6 hours PRN Mild Pain (1 - 3)  ondansetron    Tablet 4 milliGRAM(s) Oral every 8 hours PRN Nausea and/or Vomiting  oxyCODONE    Solution 5 milliGRAM(s) Oral every 4 hours PRN Moderate and Severe Pain      PHYSICAL EXAM:  T(C): 36.3 (06-20-21 @ 10:00), Max: 36.8 (06-19-21 @ 17:42)  HR: 72 (06-20-21 @ 10:00) (68 - 82)  BP: 126/80 (06-20-21 @ 10:00) (121/73 - 146/84)  RR: 18 (06-20-21 @ 10:00) (16 - 20)  SpO2: 100% (06-20-21 @ 10:00) (99% - 100%)  I&O's Summary    19 Jun 2021 07:01  -  20 Jun 2021 07:00  --------------------------------------------------------  IN: 50 mL / OUT: 2400 mL / NET: -2350 mL    20 Jun 2021 07:01  -  20 Jun 2021 10:52  --------------------------------------------------------  IN: 0 mL / OUT: 1000 mL / NET: -1000 mL      GENERAL: NAD, well-developed  HEAD:  Atraumatic, Normocephalic, MMM; neck incision c/d/i  CHEST/LUNG: No use of accessory muscles, CTAB, breathing non-labored  COR: RR, no mrcg  ABD: Soft, ND/NT, +BS  PSYCH: AAOx3  NEUROLOGY: CN II-XII grossly intact, moving all extremities  SKIN: No rashes or lesions  EXT: wwp, no cce    LABS:  CAPILLARY BLOOD GLUCOSE                              12.6   4.54  )-----------( 287      ( 20 Jun 2021 07:53 )             37.0     06-20    132<L>  |  98  |  5<L>  ----------------------------<  85  4.1   |  22  |  0.66    Ca    9.0      20 Jun 2021 07:53  Phos  3.2     06-20  Mg     1.9     06-20                  RADIOLOGY & ADDITIONAL TESTS:    Telemetry Personally Reviewed -     Imaging Personally Reviewed -     Imaging Reviewed -     Consultant(s) Notes Reviewed -       Care Discussed with Consultants/Other Providers -

## 2021-06-20 NOTE — PROGRESS NOTE ADULT - ASSESSMENT
YUNIOR TANG is a 60y male s/p TL, bl ND, reconstruction with L ALT and placement of TEP 6/11, recovering appropriately.     - q4 flap checks with pencil doppler at suture on neck   - BID baci ointment to leg incision  - OOB and activity as tolerated  - diet per ENT      Plastic Surgery Resident  [facility]: [phone]  [facility]: [id number]

## 2021-06-20 NOTE — PROGRESS NOTE ADULT - SUBJECTIVE AND OBJECTIVE BOX
Plastic Surgery Progress Note    Subjective: seen on rounds, no issues    Objective:  Exam:   General: NAD  Neuro: Alert  Pulm: comfortable  HEENT: stable Doppler signal over neck bee, trach in place, incision line intact, skin healthy    Vital Signs Last 24 Hrs  T(C): 36.3 (20 Jun 2021 10:00), Max: 36.8 (19 Jun 2021 17:42)  T(F): 97.3 (20 Jun 2021 10:00), Max: 98.3 (19 Jun 2021 22:44)  HR: 72 (20 Jun 2021 10:00) (68 - 82)  BP: 126/80 (20 Jun 2021 10:00) (121/73 - 146/84)  BP(mean): --  RR: 18 (20 Jun 2021 10:00) (16 - 20)  SpO2: 100% (20 Jun 2021 10:00) (99% - 100%)    I&O's Detail    19 Jun 2021 07:01  -  20 Jun 2021 07:00  --------------------------------------------------------  IN:    IV PiggyBack: 50 mL  Total IN: 50 mL    OUT:    Colostomy (mL): 700 mL    Voided (mL): 1700 mL  Total OUT: 2400 mL    Total NET: -2350 mL      20 Jun 2021 07:01  -  20 Jun 2021 11:17  --------------------------------------------------------  IN:  Total IN: 0 mL    OUT:    Voided (mL): 1000 mL  Total OUT: 1000 mL    Total NET: -1000 mL      MEDICATIONS  (STANDING):  aspirin  chewable 81 milliGRAM(s) Oral daily  BACItracin   Ointment 1 Application(s) Topical two times a day  enoxaparin Injectable 40 milliGRAM(s) SubCutaneous daily  multivitamin 1 Tablet(s) Oral daily  polyethylene glycol 3350 17 Gram(s) Oral daily  senna 2 Tablet(s) Oral at bedtime  sodium chloride 0.9% lock flush 3 milliLiter(s) IV Push every 8 hours    MEDICATIONS  (PRN):  acetaminophen    Suspension .. 650 milliGRAM(s) Oral every 6 hours PRN Mild Pain (1 - 3)  ondansetron    Tablet 4 milliGRAM(s) Oral every 8 hours PRN Nausea and/or Vomiting  oxyCODONE    Solution 5 milliGRAM(s) Oral every 4 hours PRN Moderate and Severe Pain      LABS:                        12.6   4.54  )-----------( 287      ( 20 Jun 2021 07:53 )             37.0     06-20    132<L>  |  98  |  5<L>  ----------------------------<  85  4.1   |  22  |  0.66    Ca    9.0      20 Jun 2021 07:53  Phos  3.2     06-20  Mg     1.9     06-20

## 2021-06-21 LAB
ANION GAP SERPL CALC-SCNC: 10 MMOL/L — SIGNIFICANT CHANGE UP (ref 7–14)
ANION GAP SERPL CALC-SCNC: 15 MMOL/L — HIGH (ref 7–14)
BUN SERPL-MCNC: 10 MG/DL — SIGNIFICANT CHANGE UP (ref 7–23)
BUN SERPL-MCNC: 12 MG/DL — SIGNIFICANT CHANGE UP (ref 7–23)
CALCIUM SERPL-MCNC: 8.7 MG/DL — SIGNIFICANT CHANGE UP (ref 8.4–10.5)
CALCIUM SERPL-MCNC: 9.4 MG/DL — SIGNIFICANT CHANGE UP (ref 8.4–10.5)
CHLORIDE SERPL-SCNC: 100 MMOL/L — SIGNIFICANT CHANGE UP (ref 98–107)
CHLORIDE SERPL-SCNC: 98 MMOL/L — SIGNIFICANT CHANGE UP (ref 98–107)
CO2 SERPL-SCNC: 17 MMOL/L — LOW (ref 22–31)
CO2 SERPL-SCNC: 22 MMOL/L — SIGNIFICANT CHANGE UP (ref 22–31)
CREAT SERPL-MCNC: 0.67 MG/DL — SIGNIFICANT CHANGE UP (ref 0.5–1.3)
CREAT SERPL-MCNC: 0.73 MG/DL — SIGNIFICANT CHANGE UP (ref 0.5–1.3)
GLUCOSE SERPL-MCNC: 110 MG/DL — HIGH (ref 70–99)
GLUCOSE SERPL-MCNC: 87 MG/DL — SIGNIFICANT CHANGE UP (ref 70–99)
MAGNESIUM SERPL-MCNC: 1.8 MG/DL — SIGNIFICANT CHANGE UP (ref 1.6–2.6)
MAGNESIUM SERPL-MCNC: 1.9 MG/DL — SIGNIFICANT CHANGE UP (ref 1.6–2.6)
PHOSPHATE SERPL-MCNC: 3.1 MG/DL — SIGNIFICANT CHANGE UP (ref 2.5–4.5)
PHOSPHATE SERPL-MCNC: 3.2 MG/DL — SIGNIFICANT CHANGE UP (ref 2.5–4.5)
POTASSIUM SERPL-MCNC: 4.4 MMOL/L — SIGNIFICANT CHANGE UP (ref 3.5–5.3)
POTASSIUM SERPL-MCNC: 5.5 MMOL/L — HIGH (ref 3.5–5.3)
POTASSIUM SERPL-SCNC: 4.4 MMOL/L — SIGNIFICANT CHANGE UP (ref 3.5–5.3)
POTASSIUM SERPL-SCNC: 5.5 MMOL/L — HIGH (ref 3.5–5.3)
SODIUM SERPL-SCNC: 130 MMOL/L — LOW (ref 135–145)
SODIUM SERPL-SCNC: 132 MMOL/L — LOW (ref 135–145)

## 2021-06-21 PROCEDURE — 99232 SBSQ HOSP IP/OBS MODERATE 35: CPT

## 2021-06-21 RX ORDER — SENNA PLUS 8.6 MG/1
2 TABLET ORAL
Qty: 30 | Refills: 0
Start: 2021-06-21 | End: 2021-07-05

## 2021-06-21 RX ORDER — MAGNESIUM OXIDE 400 MG ORAL TABLET 241.3 MG
400 TABLET ORAL DAILY
Refills: 0 | Status: DISCONTINUED | OUTPATIENT
Start: 2021-06-21 | End: 2021-06-22

## 2021-06-21 RX ORDER — BACITRACIN ZINC 500 UNIT/G
1 OINTMENT IN PACKET (EA) TOPICAL
Qty: 1 | Refills: 0
Start: 2021-06-21

## 2021-06-21 RX ORDER — ASPIRIN/CALCIUM CARB/MAGNESIUM 324 MG
1 TABLET ORAL
Qty: 0 | Refills: 0 | DISCHARGE
Start: 2021-06-21

## 2021-06-21 RX ORDER — ACETAMINOPHEN 500 MG
20.31 TABLET ORAL
Qty: 0 | Refills: 0 | DISCHARGE
Start: 2021-06-21

## 2021-06-21 RX ADMIN — Medication 650 MILLIGRAM(S): at 12:30

## 2021-06-21 RX ADMIN — SODIUM CHLORIDE 3 MILLILITER(S): 9 INJECTION INTRAMUSCULAR; INTRAVENOUS; SUBCUTANEOUS at 13:03

## 2021-06-21 RX ADMIN — Medication 1 TABLET(S): at 11:39

## 2021-06-21 RX ADMIN — Medication 1 APPLICATION(S): at 06:27

## 2021-06-21 RX ADMIN — SODIUM CHLORIDE 3 MILLILITER(S): 9 INJECTION INTRAMUSCULAR; INTRAVENOUS; SUBCUTANEOUS at 06:29

## 2021-06-21 RX ADMIN — Medication 81 MILLIGRAM(S): at 11:39

## 2021-06-21 RX ADMIN — Medication 650 MILLIGRAM(S): at 11:41

## 2021-06-21 RX ADMIN — ENOXAPARIN SODIUM 40 MILLIGRAM(S): 100 INJECTION SUBCUTANEOUS at 11:39

## 2021-06-21 RX ADMIN — SODIUM CHLORIDE 3 MILLILITER(S): 9 INJECTION INTRAMUSCULAR; INTRAVENOUS; SUBCUTANEOUS at 21:40

## 2021-06-21 RX ADMIN — Medication 1 APPLICATION(S): at 18:05

## 2021-06-21 RX ADMIN — MAGNESIUM OXIDE 400 MG ORAL TABLET 400 MILLIGRAM(S): 241.3 TABLET ORAL at 21:40

## 2021-06-21 NOTE — PROGRESS NOTE ADULT - SUBJECTIVE AND OBJECTIVE BOX
ENT Progress Note    Interval:  NAEON    ICU Vital Signs Last 24 Hrs  T(C): 36.8 (21 Jun 2021 06:21), Max: 36.8 (20 Jun 2021 18:00)  T(F): 98.3 (21 Jun 2021 06:21), Max: 98.3 (20 Jun 2021 18:00)  HR: 72 (21 Jun 2021 06:21) (72 - 93)  BP: 124/80 (21 Jun 2021 06:21) (100/69 - 136/76)  BP(mean): --  ABP: --  ABP(mean): --  RR: 17 (21 Jun 2021 06:21) (17 - 100)  SpO2: 100% (21 Jun 2021 06:21) (98% - 100%)    Exam  NAD, alert, awake  OC mucosa pink and moist  Neck with apron incision well healing  Laryngectomy stoma with 8LT  Suture doppler site with good signal  L ALT incision with staples, c/d/i    A/P: 60M w/ laryngeal ca s/p TL, bl SND, reconstruction with L ALT and placement of TEP 6/11     - soft diet  - humidified O2 via laryngectomy stoma  - routine larytube care  - bacitracin to incision  - asa/lov per prs  - flap checks per prs  - PT, OOB  - please call ENT with questions

## 2021-06-21 NOTE — PROGRESS NOTE ADULT - SUBJECTIVE AND OBJECTIVE BOX
[facility] Division of Hospital Medicine  Dilan Sarabia MD  In House Pager [phone]    Patient is a 60y old  Male who presents with a chief complaint of Q1 Flap checks (14 Jun 2021 08:39)      SUBJECTIVE / OVERNIGHT EVENTS:  No overnight events. Labs and vitals reviewed.  Patient seen and examined at bedside,  reports some throbbing ache around trach site. also concern that he is having hard time breathing when looking down.   No fever, no chills, no SOB, no CP, no n/v/d, no abd pain, no dysuria      MEDICATIONS  (STANDING):  aspirin  chewable 81 milliGRAM(s) Oral daily  BACItracin   Ointment 1 Application(s) Topical two times a day  enoxaparin Injectable 40 milliGRAM(s) SubCutaneous daily  multivitamin 1 Tablet(s) Oral daily  polyethylene glycol 3350 17 Gram(s) Oral daily  senna 2 Tablet(s) Oral at bedtime  sodium chloride 0.9% lock flush 3 milliLiter(s) IV Push every 8 hours    MEDICATIONS  (PRN):  acetaminophen    Suspension .. 650 milliGRAM(s) Oral every 6 hours PRN Mild Pain (1 - 3)  ondansetron    Tablet 4 milliGRAM(s) Oral every 8 hours PRN Nausea and/or Vomiting  oxyCODONE    Solution 5 milliGRAM(s) Oral every 4 hours PRN Moderate and Severe Pain    CAPILLARY BLOOD GLUCOSE        I&O's Summary    20 Jun 2021 07:01  -  21 Jun 2021 07:00  --------------------------------------------------------  IN: 50 mL / OUT: 2385 mL / NET: -2335 mL        PHYSICAL EXAM:  Vital Signs Last 24 Hrs  T(C): 36.7 (21 Jun 2021 09:25), Max: 36.8 (20 Jun 2021 18:00)  T(F): 98 (21 Jun 2021 09:25), Max: 98.3 (20 Jun 2021 18:00)  HR: 86 (21 Jun 2021 09:25) (72 - 93)  BP: 111/69 (21 Jun 2021 09:25) (100/69 - 136/76)  BP(mean): --  RR: 18 (21 Jun 2021 09:25) (17 - 100)  SpO2: 99% (21 Jun 2021 09:25) (98% - 100%)    Gen: NAD; resting in bed.  Pulm: no respiratory distress; CTA b/l; no wheezing; trach collar in place.   Cards: RRR, nl S1/S2; no obvious murmurs  Abd: soft; NT on exam  Ext: no cyanosis; no edema  Skin: no rash; no cyanosis    LABS:                        12.6   4.54  )-----------( 287      ( 20 Jun 2021 07:53 )             37.0     06-21    132<L>  |  100  |  10  ----------------------------<  87  5.5<H>   |  17<L>  |  0.67    Ca    9.4      21 Jun 2021 07:23  Phos  3.2     06-21  Mg     1.9     06-21                  RADIOLOGY & ADDITIONAL TESTS:  Results Reviewed: Y  Imaging Personally Reviewed: Y  Electrocardiogram Personally Reviewed: Y    COORDINATION OF CARE:  Care Discussed with Consultants/Other Providers [Y/N]: Y  Prior or Outpatient Records Reviewed [Y/N]: Y

## 2021-06-21 NOTE — PROGRESS NOTE ADULT - ASSESSMENT
YUNIOR TANG is a 60y male s/p TL, bl ND, reconstruction with L ALT and placement of TEP 6/11, recovering appropriately.     - q4 flap checks with pencil doppler at suture on neck   - BID baci ointment to leg incision - possible staple removal from leg today   - OOB and activity as tolerated  - diet per ENT      Plastic Surgery Resident  [facility]: [phone]  [facility]: [id number]

## 2021-06-21 NOTE — PROGRESS NOTE ADULT - SUBJECTIVE AND OBJECTIVE BOX
Plastic Surgery Progress Note    Subjective: seen on rounds, no issues    Objective:  Exam:   General: NAD  Neuro: Alert  Pulm: comfortable  HEENT: stable Doppler signal over neck bee, trach in place, incision line intact, minimal erythema around incision line,skin healthy  LEG: donor site incision intact, non erythematous, no palpable collections, drain removed previously with healed site         T(C): 36.8 (06-21-21 @ 06:21), Max: 36.8 (06-20-21 @ 18:00)  T(F): 98.3 (06-21-21 @ 06:21), Max: 98.3 (06-20-21 @ 18:00)  HR: 72 (06-21-21 @ 06:21) (72 - 93)  BP: 124/80 (06-21-21 @ 06:21) (100/69 - 136/76)  RR: 17 (06-21-21 @ 06:21) (17 - 100)  SpO2: 100% (06-21-21 @ 06:21) (98% - 100%)      20 Jun 2021 07:01  -  21 Jun 2021 07:00  --------------------------------------------------------  IN:    Oral Fluid: 50 mL  Total IN: 50 mL    OUT:    Colostomy (mL): 675 mL    IV PiggyBack: 0 mL    Voided (mL): 1700 mL  Total OUT: 2375 mL    Total NET: -2325 mL    MEDICATIONS  (STANDING):  aspirin  chewable 81 milliGRAM(s) Oral daily  BACItracin   Ointment 1 Application(s) Topical two times a day  enoxaparin Injectable 40 milliGRAM(s) SubCutaneous daily  multivitamin 1 Tablet(s) Oral daily  polyethylene glycol 3350 17 Gram(s) Oral daily  senna 2 Tablet(s) Oral at bedtime  sodium chloride 0.9% lock flush 3 milliLiter(s) IV Push every 8 hours    MEDICATIONS  (PRN):  acetaminophen    Suspension .. 650 milliGRAM(s) Oral every 6 hours PRN Mild Pain (1 - 3)  ondansetron    Tablet 4 milliGRAM(s) Oral every 8 hours PRN Nausea and/or Vomiting  oxyCODONE    Solution 5 milliGRAM(s) Oral every 4 hours PRN Moderate and Severe Pain                 CAPILLARY BLOOD GLUCOSE

## 2021-06-22 LAB
ANION GAP SERPL CALC-SCNC: 12 MMOL/L — SIGNIFICANT CHANGE UP (ref 7–14)
BUN SERPL-MCNC: 12 MG/DL — SIGNIFICANT CHANGE UP (ref 7–23)
CALCIUM SERPL-MCNC: 8.9 MG/DL — SIGNIFICANT CHANGE UP (ref 8.4–10.5)
CHLORIDE SERPL-SCNC: 100 MMOL/L — SIGNIFICANT CHANGE UP (ref 98–107)
CO2 SERPL-SCNC: 21 MMOL/L — LOW (ref 22–31)
CREAT SERPL-MCNC: 0.72 MG/DL — SIGNIFICANT CHANGE UP (ref 0.5–1.3)
GLUCOSE SERPL-MCNC: 91 MG/DL — SIGNIFICANT CHANGE UP (ref 70–99)
MAGNESIUM SERPL-MCNC: 1.7 MG/DL — SIGNIFICANT CHANGE UP (ref 1.6–2.6)
PHOSPHATE SERPL-MCNC: 3.6 MG/DL — SIGNIFICANT CHANGE UP (ref 2.5–4.5)
POTASSIUM SERPL-MCNC: 4 MMOL/L — SIGNIFICANT CHANGE UP (ref 3.5–5.3)
POTASSIUM SERPL-SCNC: 4 MMOL/L — SIGNIFICANT CHANGE UP (ref 3.5–5.3)
SODIUM SERPL-SCNC: 133 MMOL/L — LOW (ref 135–145)

## 2021-06-22 PROCEDURE — 99232 SBSQ HOSP IP/OBS MODERATE 35: CPT

## 2021-06-22 RX ORDER — BACITRACIN ZINC 500 UNIT/G
1 OINTMENT IN PACKET (EA) TOPICAL THREE TIMES A DAY
Refills: 0 | Status: DISCONTINUED | OUTPATIENT
Start: 2021-06-22 | End: 2021-06-24

## 2021-06-22 RX ORDER — MAGNESIUM OXIDE 400 MG ORAL TABLET 241.3 MG
400 TABLET ORAL
Refills: 0 | Status: DISCONTINUED | OUTPATIENT
Start: 2021-06-22 | End: 2021-06-24

## 2021-06-22 RX ADMIN — Medication 1 APPLICATION(S): at 23:05

## 2021-06-22 RX ADMIN — Medication 650 MILLIGRAM(S): at 12:43

## 2021-06-22 RX ADMIN — ENOXAPARIN SODIUM 40 MILLIGRAM(S): 100 INJECTION SUBCUTANEOUS at 11:43

## 2021-06-22 RX ADMIN — Medication 1 APPLICATION(S): at 17:14

## 2021-06-22 RX ADMIN — OXYCODONE HYDROCHLORIDE 5 MILLIGRAM(S): 5 TABLET ORAL at 23:06

## 2021-06-22 RX ADMIN — Medication 81 MILLIGRAM(S): at 11:44

## 2021-06-22 RX ADMIN — Medication 650 MILLIGRAM(S): at 11:43

## 2021-06-22 RX ADMIN — MAGNESIUM OXIDE 400 MG ORAL TABLET 400 MILLIGRAM(S): 241.3 TABLET ORAL at 11:44

## 2021-06-22 RX ADMIN — Medication 1 TABLET(S): at 11:44

## 2021-06-22 RX ADMIN — Medication 1 APPLICATION(S): at 17:15

## 2021-06-22 RX ADMIN — Medication 1 APPLICATION(S): at 05:34

## 2021-06-22 RX ADMIN — SODIUM CHLORIDE 3 MILLILITER(S): 9 INJECTION INTRAMUSCULAR; INTRAVENOUS; SUBCUTANEOUS at 07:11

## 2021-06-22 RX ADMIN — SODIUM CHLORIDE 3 MILLILITER(S): 9 INJECTION INTRAMUSCULAR; INTRAVENOUS; SUBCUTANEOUS at 22:54

## 2021-06-22 RX ADMIN — SODIUM CHLORIDE 3 MILLILITER(S): 9 INJECTION INTRAMUSCULAR; INTRAVENOUS; SUBCUTANEOUS at 12:57

## 2021-06-22 NOTE — PROGRESS NOTE ADULT - SUBJECTIVE AND OBJECTIVE BOX
Plastic Surgery Progress Note    Subjective:   -seen on rounds, no issues    Objective:  Exam:   General: NAD  Neuro: Alert  Pulm: comfortable  HEENT: stable Doppler signal over neck bee, trach in place, incision line intact, minimal erythema around incision line,skin healthy  LEG: donor site incision intact, non erythematous, no palpable collections, drain removed previously with healed site     Vital Signs  T(C): 36.6 (06-22-21 @ 05:32), Max: 37.2 (06-21-21 @ 13:50)  T(F): 97.8 (06-22-21 @ 05:32), Max: 98.9 (06-21-21 @ 13:50)  HR: 71 (06-22-21 @ 05:32) (71 - 87)  BP: 137/87 (06-22-21 @ 05:32) (111/69 - 137/87)  RR: 17 (06-22-21 @ 05:32) (17 - 19)  SpO2: 100% (06-22-21 @ 05:32) (94% - 100%)      21 Jun 2021 07:01  -  22 Jun 2021 07:00  --------------------------------------------------------  IN:    Oral Fluid: 150 mL  Total IN: 150 mL    OUT:    Colostomy (mL): 350 mL    IV PiggyBack: 0 mL    Voided (mL): 700 mL  Total OUT: 1050 mL    Total NET: -900 mL

## 2021-06-22 NOTE — PROGRESS NOTE ADULT - SUBJECTIVE AND OBJECTIVE BOX
[facility] Division of Hospital Medicine  Dilan Sarabia MD  In House Pager [phone]    Patient is a 60y old  Male who presents with a chief complaint of Q1 Flap checks (14 Jun 2021 08:39)      SUBJECTIVE / OVERNIGHT EVENTS:  No overnight events. Labs and vitals reviewed.  Patient seen and examined at bedside, no acute complaints.  No fever, no chills, no SOB, no CP, no n/v/d, no abd pain, no dysuria      MEDICATIONS  (STANDING):  aspirin  chewable 81 milliGRAM(s) Oral daily  BACItracin   Ointment 1 Application(s) Topical two times a day  BACItracin   Ointment 1 Application(s) Topical three times a day  enoxaparin Injectable 40 milliGRAM(s) SubCutaneous daily  magnesium oxide 400 milliGRAM(s) Oral daily  multivitamin 1 Tablet(s) Oral daily  polyethylene glycol 3350 17 Gram(s) Oral daily  senna 2 Tablet(s) Oral at bedtime  sodium chloride 0.9% lock flush 3 milliLiter(s) IV Push every 8 hours    MEDICATIONS  (PRN):  acetaminophen    Suspension .. 650 milliGRAM(s) Oral every 6 hours PRN Mild Pain (1 - 3)  ondansetron    Tablet 4 milliGRAM(s) Oral every 8 hours PRN Nausea and/or Vomiting  oxyCODONE    Solution 5 milliGRAM(s) Oral every 4 hours PRN Moderate and Severe Pain    CAPILLARY BLOOD GLUCOSE        I&O's Summary    21 Jun 2021 07:01  -  22 Jun 2021 07:00  --------------------------------------------------------  IN: 150 mL / OUT: 1050 mL / NET: -900 mL    22 Jun 2021 07:01  -  22 Jun 2021 11:50  --------------------------------------------------------  IN: 240 mL / OUT: 100 mL / NET: 140 mL        PHYSICAL EXAM:  Vital Signs Last 24 Hrs  T(C): 37.1 (22 Jun 2021 10:00), Max: 37.2 (21 Jun 2021 13:50)  T(F): 98.7 (22 Jun 2021 10:00), Max: 98.9 (21 Jun 2021 13:50)  HR: 76 (22 Jun 2021 10:00) (71 - 87)  BP: 99/67 (22 Jun 2021 10:00) (99/67 - 137/87)  BP(mean): --  RR: 19 (22 Jun 2021 10:00) (17 - 19)  SpO2: 100% (22 Jun 2021 10:00) (94% - 100%)    Gen: NAD; resting in bed.  Pulm: no respiratory distress; CTA b/l; no wheezing; +trach  Cards: RRR, nl S1/S2; no obvious murmurs  Abd: soft; NT on exam  Ext: no cyanosis; no edema  Skin: no rash; no cyanosis    LABS:    06-22    133<L>  |  100  |  12  ----------------------------<  91  4.0   |  21<L>  |  0.72    Ca    8.9      22 Jun 2021 07:21  Phos  3.6     06-22  Mg     1.7     06-22                  RADIOLOGY & ADDITIONAL TESTS:  Results Reviewed: Y  Imaging Personally Reviewed: Y  Electrocardiogram Personally Reviewed: Y    COORDINATION OF CARE:  Care Discussed with Consultants/Other Providers [Y/N]: Y  Prior or Outpatient Records Reviewed [Y/N]: [place]

## 2021-06-22 NOTE — PROGRESS NOTE ADULT - ASSESSMENT
YUNIOR TANG is a 60y male s/p TL, bl ND, reconstruction with L ALT and placement of TEP 6/11, recovering appropriately.     - q4 flap checks with pencil doppler at suture on neck   - BID baci ointment to leg incision   - OOB and activity as tolerated  - diet per ENT      Plastic Surgery Resident  [facility]: [phone]  [facility]: [id number]

## 2021-06-22 NOTE — PROGRESS NOTE ADULT - SUBJECTIVE AND OBJECTIVE BOX
ENT Progress Note    Interval:    6/21: NAEON  6/22: NAEON - refused electrolarynx yesterday.    ICU Vital Signs Last 24 Hrs  T(C): 36.6 (22 Jun 2021 05:32), Max: 37.2 (21 Jun 2021 13:50)  T(F): 97.8 (22 Jun 2021 05:32), Max: 98.9 (21 Jun 2021 13:50)  HR: 71 (22 Jun 2021 05:32) (71 - 87)  BP: 137/87 (22 Jun 2021 05:32) (111/69 - 137/87)  BP(mean): --  ABP: --  ABP(mean): --  RR: 17 (22 Jun 2021 05:32) (17 - 19)  SpO2: 100% (22 Jun 2021 05:32) (94% - 100%)    Exam  NAD, alert, awake  OC mucosa pink and moist  Neck with apron incision well healing  Laryngectomy stoma with 8LT  Suture doppler site with good signal  L ALT incision with staples, c/d/i    A/P: 60M w/ laryngeal ca s/p TL, bl SND, reconstruction with L ALT and placement of TEP 6/11     - f/u electrolarynx  - soft diet  - humidified O2 via laryngectomy stoma  - routine larytube care  - bacitracin to incision  - asa/lov per prs  - flap checks per prs  - PT, OOB  - please call ENT with questions

## 2021-06-23 ENCOUNTER — TRANSCRIPTION ENCOUNTER (OUTPATIENT)
Age: 61
End: 2021-06-23

## 2021-06-23 LAB
ANION GAP SERPL CALC-SCNC: 13 MMOL/L — SIGNIFICANT CHANGE UP (ref 7–14)
BUN SERPL-MCNC: 14 MG/DL — SIGNIFICANT CHANGE UP (ref 7–23)
CALCIUM SERPL-MCNC: 9.1 MG/DL — SIGNIFICANT CHANGE UP (ref 8.4–10.5)
CHLORIDE SERPL-SCNC: 98 MMOL/L — SIGNIFICANT CHANGE UP (ref 98–107)
CO2 SERPL-SCNC: 22 MMOL/L — SIGNIFICANT CHANGE UP (ref 22–31)
CREAT SERPL-MCNC: 0.74 MG/DL — SIGNIFICANT CHANGE UP (ref 0.5–1.3)
GLUCOSE SERPL-MCNC: 65 MG/DL — LOW (ref 70–99)
HCT VFR BLD CALC: 34.7 % — LOW (ref 39–50)
HGB BLD-MCNC: 11.5 G/DL — LOW (ref 13–17)
MAGNESIUM SERPL-MCNC: 1.7 MG/DL — SIGNIFICANT CHANGE UP (ref 1.6–2.6)
MCHC RBC-ENTMCNC: 32.7 PG — SIGNIFICANT CHANGE UP (ref 27–34)
MCHC RBC-ENTMCNC: 33.1 GM/DL — SIGNIFICANT CHANGE UP (ref 32–36)
MCV RBC AUTO: 98.6 FL — SIGNIFICANT CHANGE UP (ref 80–100)
NRBC # BLD: 0 /100 WBCS — SIGNIFICANT CHANGE UP
NRBC # FLD: 0 K/UL — SIGNIFICANT CHANGE UP
PHOSPHATE SERPL-MCNC: 3.5 MG/DL — SIGNIFICANT CHANGE UP (ref 2.5–4.5)
PLATELET # BLD AUTO: 321 K/UL — SIGNIFICANT CHANGE UP (ref 150–400)
POTASSIUM SERPL-MCNC: 3.6 MMOL/L — SIGNIFICANT CHANGE UP (ref 3.5–5.3)
POTASSIUM SERPL-SCNC: 3.6 MMOL/L — SIGNIFICANT CHANGE UP (ref 3.5–5.3)
RBC # BLD: 3.52 M/UL — LOW (ref 4.2–5.8)
RBC # FLD: 11.9 % — SIGNIFICANT CHANGE UP (ref 10.3–14.5)
SODIUM SERPL-SCNC: 133 MMOL/L — LOW (ref 135–145)
WBC # BLD: 4.85 K/UL — SIGNIFICANT CHANGE UP (ref 3.8–10.5)
WBC # FLD AUTO: 4.85 K/UL — SIGNIFICANT CHANGE UP (ref 3.8–10.5)

## 2021-06-23 PROCEDURE — 99232 SBSQ HOSP IP/OBS MODERATE 35: CPT

## 2021-06-23 RX ORDER — OXYCODONE HYDROCHLORIDE 5 MG/1
5 TABLET ORAL
Qty: 120 | Refills: 0
Start: 2021-06-23 | End: 2021-06-26

## 2021-06-23 RX ORDER — ENOXAPARIN SODIUM 100 MG/ML
40 INJECTION SUBCUTANEOUS DAILY
Refills: 0 | Status: DISCONTINUED | OUTPATIENT
Start: 2021-06-23 | End: 2021-06-24

## 2021-06-23 RX ORDER — OXYCODONE HYDROCHLORIDE 5 MG/1
5 TABLET ORAL EVERY 4 HOURS
Refills: 0 | Status: DISCONTINUED | OUTPATIENT
Start: 2021-06-23 | End: 2021-06-24

## 2021-06-23 RX ORDER — POTASSIUM CHLORIDE 20 MEQ
40 PACKET (EA) ORAL EVERY 4 HOURS
Refills: 0 | Status: COMPLETED | OUTPATIENT
Start: 2021-06-23 | End: 2021-06-23

## 2021-06-23 RX ADMIN — Medication 1 TABLET(S): at 13:32

## 2021-06-23 RX ADMIN — Medication 40 MILLIEQUIVALENT(S): at 17:42

## 2021-06-23 RX ADMIN — SODIUM CHLORIDE 3 MILLILITER(S): 9 INJECTION INTRAMUSCULAR; INTRAVENOUS; SUBCUTANEOUS at 06:00

## 2021-06-23 RX ADMIN — SODIUM CHLORIDE 3 MILLILITER(S): 9 INJECTION INTRAMUSCULAR; INTRAVENOUS; SUBCUTANEOUS at 22:00

## 2021-06-23 RX ADMIN — Medication 1 APPLICATION(S): at 06:16

## 2021-06-23 RX ADMIN — Medication 1 APPLICATION(S): at 21:46

## 2021-06-23 RX ADMIN — Medication 81 MILLIGRAM(S): at 13:31

## 2021-06-23 RX ADMIN — SODIUM CHLORIDE 3 MILLILITER(S): 9 INJECTION INTRAMUSCULAR; INTRAVENOUS; SUBCUTANEOUS at 13:35

## 2021-06-23 RX ADMIN — OXYCODONE HYDROCHLORIDE 5 MILLIGRAM(S): 5 TABLET ORAL at 10:50

## 2021-06-23 RX ADMIN — OXYCODONE HYDROCHLORIDE 5 MILLIGRAM(S): 5 TABLET ORAL at 09:59

## 2021-06-23 RX ADMIN — OXYCODONE HYDROCHLORIDE 5 MILLIGRAM(S): 5 TABLET ORAL at 00:00

## 2021-06-23 RX ADMIN — ENOXAPARIN SODIUM 40 MILLIGRAM(S): 100 INJECTION SUBCUTANEOUS at 13:31

## 2021-06-23 RX ADMIN — Medication 1 APPLICATION(S): at 17:42

## 2021-06-23 RX ADMIN — Medication 1 APPLICATION(S): at 13:33

## 2021-06-23 RX ADMIN — OXYCODONE HYDROCHLORIDE 5 MILLIGRAM(S): 5 TABLET ORAL at 23:48

## 2021-06-23 RX ADMIN — Medication 40 MILLIEQUIVALENT(S): at 21:46

## 2021-06-23 NOTE — PROGRESS NOTE ADULT - SUBJECTIVE AND OBJECTIVE BOX
ENT Progress Note    Interval:    6/21: NAEON  6/22: NAEON - refused electrolarynx yesterday.  6/23: NAEON    ICU Vital Signs Last 24 Hrs  T(C): 36.6 (23 Jun 2021 06:22), Max: 37.1 (22 Jun 2021 10:00)  T(F): 97.9 (23 Jun 2021 06:22), Max: 98.7 (22 Jun 2021 10:00)  HR: 65 (23 Jun 2021 06:22) (63 - 76)  BP: 117/74 (23 Jun 2021 06:22) (98/61 - 129/79)  BP(mean): --  ABP: --  ABP(mean): --  RR: 16 (23 Jun 2021 06:22) (16 - 19)  SpO2: 100% (23 Jun 2021 06:22) (96% - 100%)      Exam  NAD, alert, awake  OC mucosa pink and moist  Neck with apron incision well healing  Laryngectomy stoma with 8LT  Suture doppler site with good signal  L ALT incision with staples, c/d/i    A/P: 60M w/ laryngeal ca s/p TL, bl SND, reconstruction with L ALT and placement of TEP 6/11     - f/u home communication for dispo  - soft diet  - humidified O2 via laryngectomy stoma  - routine larytube care  - bacitracin to incision  - asa/lov per prs  - flap checks per prs  - PT, OOB  - please call ENT with questions

## 2021-06-23 NOTE — DISCHARGE NOTE NURSING/CASE MANAGEMENT/SOCIAL WORK - PATIENT PORTAL LINK FT
You can access the FollowMyHealth Patient Portal offered by [facility] by registering at the following website: [email or web address]. By joining [email or web address]’s FollowMyHealth portal, you will also be able to view your health information using other applications (apps) compatible with our system.

## 2021-06-23 NOTE — DISCHARGE NOTE NURSING/CASE MANAGEMENT/SOCIAL WORK - NSDCPNINST_GEN_ALL_CORE
Watch for signs of infection; redness, swelling, fever, chills or heat, report such symptoms to the MD. No driving while taking pain medication, it causes drowsiness & constipation. Drink 6-8 glasses of fluids daily to promote hydration. No heavy lifting, pulling or pushing heavy objects. Follow up with the MD.

## 2021-06-23 NOTE — PROGRESS NOTE ADULT - ASSESSMENT
YUNIOR TANG is a 60y male s/p TL, bl ND, reconstruction with L ALT and placement of TEP 6/11, recovering appropriately.     - q4 flap checks with pencil doppler at suture on neck   - BID baci ointment to leg incision   - OOB and activity as tolerated  - diet per ENT      Plastic Surgery Resident  [facility]: [phone]  [facility]: [id number]

## 2021-06-23 NOTE — PROGRESS NOTE ADULT - SUBJECTIVE AND OBJECTIVE BOX
[facility] Division of Hospital Medicine  Dilan Sarabia MD  In House Pager [phone]    Patient is a 60y old  Male who presents with a chief complaint of Q1 Flap checks (14 Jun 2021 08:39)      SUBJECTIVE / OVERNIGHT EVENTS:  No overnight events. Labs and vitals reviewed.   Patient seen and examined at bedside, no acute complaints. all questions answered.   No fever, no chills, no SOB, no CP, no n/v/d, no abd pain, no dysuria      MEDICATIONS  (STANDING):  aspirin  chewable 81 milliGRAM(s) Oral daily  BACItracin   Ointment 1 Application(s) Topical two times a day  BACItracin   Ointment 1 Application(s) Topical three times a day  enoxaparin Injectable 40 milliGRAM(s) SubCutaneous daily  magnesium oxide 400 milliGRAM(s) Oral three times a day with meals  multivitamin 1 Tablet(s) Oral daily  polyethylene glycol 3350 17 Gram(s) Oral daily  senna 2 Tablet(s) Oral at bedtime  sodium chloride 0.9% lock flush 3 milliLiter(s) IV Push every 8 hours    MEDICATIONS  (PRN):  acetaminophen    Suspension .. 650 milliGRAM(s) Oral every 6 hours PRN Mild Pain (1 - 3)  ondansetron    Tablet 4 milliGRAM(s) Oral every 8 hours PRN Nausea and/or Vomiting  oxyCODONE    Solution 5 milliGRAM(s) Oral every 4 hours PRN Moderate and Severe Pain    CAPILLARY BLOOD GLUCOSE        I&O's Summary    22 Jun 2021 07:01  -  23 Jun 2021 07:00  --------------------------------------------------------  IN: 720 mL / OUT: 990 mL / NET: -270 mL        PHYSICAL EXAM:  Vital Signs Last 24 Hrs  T(C): 36.4 (23 Jun 2021 09:58), Max: 36.8 (23 Jun 2021 02:21)  T(F): 97.5 (23 Jun 2021 09:58), Max: 98.2 (23 Jun 2021 02:21)  HR: 74 (23 Jun 2021 09:58) (63 - 74)  BP: 108/65 (23 Jun 2021 09:58) (98/61 - 129/79)  BP(mean): --  RR: 17 (23 Jun 2021 09:58) (16 - 19)  SpO2: 100% (23 Jun 2021 09:58) (96% - 100%)    Gen: NAD; resting in bed.  Pulm: no respiratory distress; CTA b/l; no wheezing; trach  Cards: RRR, nl S1/S2; no obvious murmurs  Abd: soft; NT on exam  Ext: no cyanosis; no edema  Skin: no rash; no cyanosis    LABS:                        11.5   4.85  )-----------( 321      ( 23 Jun 2021 07:28 )             34.7     06-23    133<L>  |  98  |  14  ----------------------------<  65<L>  3.6   |  22  |  0.74    Ca    9.1      23 Jun 2021 07:28  Phos  3.5     06-23  Mg     1.7     06-23                  RADIOLOGY & ADDITIONAL TESTS:  Results Reviewed: Y  Imaging Personally Reviewed: Y  Electrocardiogram Personally Reviewed: Y    COORDINATION OF CARE:  Care Discussed with Consultants/Other Providers [Y/N]: Y  Prior or Outpatient Records Reviewed [Y/N]: [place]

## 2021-06-23 NOTE — DISCHARGE NOTE NURSING/CASE MANAGEMENT/SOCIAL WORK - NSDCDMETYPESERV_GEN_ALL_CORE_FT
All of your laryngectomy supplies, portable suction machine, and compressor were ordered from [facility]. Please contact them every month to request another shipment of laryngectomy supplies.

## 2021-06-23 NOTE — PROGRESS NOTE ADULT - ASSESSMENT
60M with history of HTN, hypothyroidism, tobacco and EtOH dependence, who presents as transfer for work-up right laryngeal mass. Recent history of hoarse voice since 1/2021, now with progressive SOB, found to have right laryngeal mass now s/p biopsy and trach, subsequent TL, bl SND, reconstruction with L ALT and placement of TEP 6/11. planning discharge home pending DME arrangement.

## 2021-06-23 NOTE — PROGRESS NOTE ADULT - SUBJECTIVE AND OBJECTIVE BOX
Plastic Surgery Progress Note    Subjective:   -seen on rounds, no issues overnight     Objective:  Exam:   General: NAD  Neuro: Alert  Pulm: comfortable  HEENT: stable Doppler signal over neck bee, trach in place, incision line intact, minimal erythema on neck stable from yesterday  LEG: donor site incision intact, non erythematous, no palpable collections, drain removed previously with healed site     Vital Signs  T(C): 36.6 (06-23-21 @ 06:22), Max: 37.1 (06-22-21 @ 10:00)  T(F): 97.9 (06-23-21 @ 06:22), Max: 98.7 (06-22-21 @ 10:00)  HR: 65 (06-23-21 @ 06:22) (63 - 76)  BP: 117/74 (06-23-21 @ 06:22) (98/61 - 129/79)  RR: 16 (06-23-21 @ 06:22) (16 - 19)  SpO2: 100% (06-23-21 @ 06:22) (96% - 100%)      22 Jun 2021 07:01  -  23 Jun 2021 07:00  --------------------------------------------------------  IN:    Oral Fluid: 720 mL  Total IN: 720 mL    OUT:    Colostomy (mL): 450 mL    Voided (mL): 540 mL  Total OUT: 990 mL    Total NET: -270 mL

## 2021-06-24 VITALS
SYSTOLIC BLOOD PRESSURE: 119 MMHG | TEMPERATURE: 98 F | DIASTOLIC BLOOD PRESSURE: 78 MMHG | HEART RATE: 81 BPM | OXYGEN SATURATION: 100 % | RESPIRATION RATE: 18 BRPM

## 2021-06-24 PROCEDURE — 99232 SBSQ HOSP IP/OBS MODERATE 35: CPT

## 2021-06-24 RX ADMIN — OXYCODONE HYDROCHLORIDE 5 MILLIGRAM(S): 5 TABLET ORAL at 00:18

## 2021-06-24 RX ADMIN — SODIUM CHLORIDE 3 MILLILITER(S): 9 INJECTION INTRAMUSCULAR; INTRAVENOUS; SUBCUTANEOUS at 06:00

## 2021-06-24 RX ADMIN — Medication 1 APPLICATION(S): at 06:11

## 2021-06-24 RX ADMIN — OXYCODONE HYDROCHLORIDE 5 MILLIGRAM(S): 5 TABLET ORAL at 09:41

## 2021-06-24 NOTE — PROGRESS NOTE ADULT - PROBLEM SELECTOR PLAN 2
Mild. Likely at baseline. Continue to monitor BMP while on free water per tube.
Mild. Likely at baseline. Continue to monitor BMP while on free water per tube.
No known baseline, but suspect chronic hyponatremia in setting EtOH dependence. Serum osmolality confirms true hypotonic state. Continue to fluid restrict to 1.5L/day.
Mild. Likely at baseline. Continue to monitor BMP while on free water per tube.
Mild. Likely at baseline. Continue to monitor BMP while on free water per tube.
No known baseline, but suspect chronic hyponatremia in setting EtOH dependence. Serum osmolality confirms true hypotonic state. Continue to fluid restrict to 1.5L/day.
Mild. Likely at baseline. Continue to monitor BMP while on free water per tube.
No known baseline, but suspect chronic hyponatremia in setting EtOH dependence. Serum osmolality confirms true hypotonic state. Continue to fluid restrict to 1.5L/day.
Mild. Likely at baseline. Continue to monitor BMP while on free water per tube.
No known baseline, but suspect chronic hyponatremia in setting EtOH dependence. Serum osmolality confirms true hypotonic state. Continue to fluid restrict to 1.5L/day.

## 2021-06-24 NOTE — PROGRESS NOTE ADULT - ASSESSMENT
YUNIOR TANG is a 60y male s/p TL, bl ND, reconstruction with L ALT and placement of TEP 6/11, recovering appropriately.     - q4 flap checks with pencil doppler at suture on neck   - BID baci ointment to leg incision   - OOB and activity as tolerated  - diet per ENT      Plastic Surgery Resident  [facility]: [phone]  [facility]: [id number]

## 2021-06-24 NOTE — PROGRESS NOTE ADULT - PROBLEM SELECTOR PROBLEM 5
Thrombocytopenia

## 2021-06-24 NOTE — PROGRESS NOTE ADULT - SUBJECTIVE AND OBJECTIVE BOX
[facility] Division of Hospital Medicine  Dilan Sarabia MD  In House Pager [phone]    Patient is a 60y old  Male who presents with a chief complaint of Q1 Flap checks (14 Jun 2021 08:39)      SUBJECTIVE / OVERNIGHT EVENTS:  No overnight events. Labs and vitals reviewed.  Patient seen and examined at bedside, no acute complaints.  No fever, no chills, no SOB, no CP, no n/v/d, no abd pain, no dysuria      MEDICATIONS  (STANDING):  aspirin  chewable 81 milliGRAM(s) Oral daily  BACItracin   Ointment 1 Application(s) Topical two times a day  BACItracin   Ointment 1 Application(s) Topical three times a day  enoxaparin Injectable 40 milliGRAM(s) SubCutaneous daily  magnesium oxide 400 milliGRAM(s) Oral three times a day with meals  multivitamin 1 Tablet(s) Oral daily  polyethylene glycol 3350 17 Gram(s) Oral daily  senna 2 Tablet(s) Oral at bedtime  sodium chloride 0.9% lock flush 3 milliLiter(s) IV Push every 8 hours    MEDICATIONS  (PRN):  acetaminophen    Suspension .. 650 milliGRAM(s) Oral every 6 hours PRN Mild Pain (1 - 3)  ondansetron    Tablet 4 milliGRAM(s) Oral every 8 hours PRN Nausea and/or Vomiting  oxyCODONE    Solution 5 milliGRAM(s) Oral every 4 hours PRN Moderate and Severe Pain    CAPILLARY BLOOD GLUCOSE        I&O's Summary    23 Jun 2021 07:01  -  24 Jun 2021 07:00  --------------------------------------------------------  IN: 500 mL / OUT: 875 mL / NET: -375 mL        PHYSICAL EXAM:  Vital Signs Last 24 Hrs  T(C): 36.7 (24 Jun 2021 09:28), Max: 37 (23 Jun 2021 18:06)  T(F): 98.1 (24 Jun 2021 09:28), Max: 98.6 (23 Jun 2021 18:06)  HR: 81 (24 Jun 2021 09:28) (70 - 94)  BP: 119/78 (24 Jun 2021 09:28) (106/68 - 121/77)  BP(mean): --  RR: 18 (24 Jun 2021 09:28) (16 - 18)  SpO2: 100% (24 Jun 2021 09:28) (97% - 100%)    Gen: NAD; resting in bed.  Pulm: no respiratory distress; CTA b/l; no wheezing  Cards: RRR, nl S1/S2; no obvious murmurs  Abd: soft; NT on exam  Ext: no cyanosis; no edema  Skin: no rash; no cyanosis    LABS:                        11.5   4.85  )-----------( 321      ( 23 Jun 2021 07:28 )             34.7     06-23    133<L>  |  98  |  14  ----------------------------<  65<L>  3.6   |  22  |  0.74    Ca    9.1      23 Jun 2021 07:28  Phos  3.5     06-23  Mg     1.7     06-23                  RADIOLOGY & ADDITIONAL TESTS:  Results Reviewed: Y  Imaging Personally Reviewed: Y  Electrocardiogram Personally Reviewed: Y    COORDINATION OF CARE:  Care Discussed with Consultants/Other Providers [Y/N]: Y  Prior or Outpatient Records Reviewed [Y/N]: Y

## 2021-06-24 NOTE — PROGRESS NOTE ADULT - PROBLEM SELECTOR PLAN 6
Lovenox

## 2021-06-24 NOTE — PROGRESS NOTE ADULT - ASSESSMENT
60M with history of HTN, hypothyroidism, tobacco and EtOH dependence, who presents as transfer for work-up right laryngeal mass. Recent history of hoarse voice since 1/2021, now with progressive SOB, found to have right laryngeal mass now s/p biopsy and trach, subsequent TL, bl SND, reconstruction with L ALT and placement of TEP 6/11. discharge home today.

## 2021-06-24 NOTE — PROGRESS NOTE ADULT - SUBJECTIVE AND OBJECTIVE BOX
ENT Progress Note    Interval:    6/21: ANDREI  6/22: ANDREI - refused electrolarynx yesterday.  6/23: ANDREI        T(C): 36.6 (06-24-21 @ 06:07), Max: 37 (06-23-21 @ 18:06)  HR: 72 (06-24-21 @ 06:07) (70 - 94)  BP: 121/77 (06-24-21 @ 06:07) (106/68 - 121/77)  RR: 17 (06-24-21 @ 06:07) (16 - 18)  SpO2: 100% (06-24-21 @ 06:07) (97% - 100%)    Exam  NAD, alert, awake  OC mucosa pink and moist  Neck with apron incision well healing  Laryngectomy stoma with 8LT  Suture doppler site with good signal  L ALT incision with staples, c/d/i    A/P: 60M w/ laryngeal ca s/p TL, bl SND, reconstruction with L ALT and placement of TEP 6/11     - f/u home communication for dispo  - soft diet  - humidified O2 via laryngectomy stoma  - routine larytube care  - bacitracin to incision  - asa/lov per prs  - flap checks per prs  - PT, OOB  - please call ENT with questions

## 2021-06-24 NOTE — PROGRESS NOTE ADULT - PROBLEM SELECTOR PROBLEM 3
Hypertension, unspecified type

## 2021-06-24 NOTE — PROGRESS NOTE ADULT - PROBLEM SELECTOR PROBLEM 1
Laryngeal mass

## 2021-06-24 NOTE — PROGRESS NOTE ADULT - PROBLEM SELECTOR PROBLEM 4
Hypothyroidism, unspecified type

## 2021-06-24 NOTE — PROGRESS NOTE ADULT - PROVIDER SPECIALTY LIST ADULT
Anesthesia
ENT
Hospitalist
Plastic Surgery
SICU
ENT
Hospitalist
Hospitalist
Plastic Surgery
Plastic Surgery
SICU
SICU
ENT
Plastic Surgery
SICU
Plastic Surgery
Plastic Surgery
Hospitalist
SICU
Hospitalist

## 2021-06-24 NOTE — PROGRESS NOTE ADULT - PROBLEM SELECTOR PLAN 5
Resolved. Pt may have underlying liver disease that can be worked up with abd US outpatient.
Suspect chronic borderline/low platelets due to EtOH and possible portal disease.     Given elevated bilirubin and AST/ALT, please obtain liver US.
Resolved. Pt may have underlying liver disease that can be worked up with abd US outpatient.
Suspect chronic borderline/low platelets due to EtOH and possible portal disease.     Given elevated bilirubin and AST/ALT, please obtain liver US.
Resolved. Pt may have underlying liver disease that can be worked up with abd US outpatient.

## 2021-06-24 NOTE — PROGRESS NOTE ADULT - SUBJECTIVE AND OBJECTIVE BOX
Plastic Surgery Progress Note    Subjective:   -seen on rounds, no issues overnight     Objective:  Exam:   General: NAD  Neuro: Alert  Pulm: comfortable  HEENT: stable Doppler signal over neck bee, trach in place, incision line intact, minimal erythema on neck stable from yesterday  LEG: donor site incision intact, non erythematous, no palpable collections, drain removed previously with healed site     Vital Signs      T(C): 36.6 (06-24-21 @ 06:07), Max: 37 (06-23-21 @ 18:06)  T(F): 97.9 (06-24-21 @ 06:07), Max: 98.6 (06-23-21 @ 18:06)  HR: 72 (06-24-21 @ 06:07) (70 - 94)  BP: 121/77 (06-24-21 @ 06:07) (106/68 - 121/77)  RR: 17 (06-24-21 @ 06:07) (16 - 18)  SpO2: 100% (06-24-21 @ 06:07) (97% - 100%)      23 Jun 2021 07:01  -  24 Jun 2021 07:00  --------------------------------------------------------  IN:    Oral Fluid: 250 mL  Total IN: 250 mL    OUT:    Colostomy (mL): 0 mL    IV PiggyBack: 0 mL  Total OUT: 0 mL    Total NET: 250 mL

## 2021-06-24 NOTE — PROGRESS NOTE ADULT - PROBLEM SELECTOR PLAN 4
Clinically euthyroid. Pt will need repeat thyroid studies in 4-6 weeks and endocrinology referral.

## 2021-06-24 NOTE — PROGRESS NOTE ADULT - PROBLEM SELECTOR PLAN 1
Pt recovering well s/p TL and reconstruction. Post-op management per ENT and Plastics.
Given history of tobacco and EtOH dependence, concern for squamous malignancy. F/u pathology. Post-op management per ENT.    Planned for total laryngectomy today. Pt has no active cardiac conditions and can perform >4 METs activity. HTN is controlled. Hyponatremia is mild and stable. Platelets improving. Assuming no change in symptoms or exam, based on current assessment pt does not require any additional cardiac testing prior to proceeding to OR.
Pt recovering well s/p TL and reconstruction. Post-op management per ENT and Plastics.
Given history of tobacco and EtOH dependence, concern for squamous malignancy. F/u pathology. Post-op management per ENT.    Planned for total laryngectomy next week. Pt has no active cardiac conditions and can perform >4 METs activity. HTN is controlled. Hyponatremia is mild and stable. Platelets improving. Assuming no change in symptoms or exam, based on current assessment pt does not require any additional cardiac testing prior to proceeding to OR.
Given history of tobacco and EtOH dependence, concern for squamous malignancy. F/u pathology. Post-op management per ENT.
Given history of tobacco and EtOH dependence, concern for squamous malignancy. F/u pathology. Post-op management per ENT.    Planned for total laryngectomy next week. Pt has no active cardiac conditions and can perform >4 METs activity. HTN is controlled. Hyponatremia is mild and stable. Platelets improving. Assuming no change in symptoms or exam, based on current assessment pt does not require any additional cardiac testing prior to proceeding to OR.

## 2021-06-24 NOTE — PROGRESS NOTE ADULT - PROBLEM SELECTOR PLAN 3
Currently BP near goal of <140/90. Continue to monitor.
Pt states he was previously on BP meds but none in the last year. Currently BP near goal of <140/90. Continue to monitor.
Pt states he was previously on BP meds but none in the last year. Currently BP near goal of <140/90. Continue to monitor.
Currently BP near goal of <140/90. Continue to monitor.
Pt states he was previously on BP meds but none in the last year. Currently BP near goal of <140/90. Continue to monitor.
Pt states he was previously on BP meds but none in the last year. Currently BP near goal of <140/90. Continue to monitor.
Currently BP near goal of <140/90. Continue to monitor.
Currently BP near goal of <140/90. Continue to monitor.

## 2021-06-25 PROBLEM — Z00.00 ENCOUNTER FOR PREVENTIVE HEALTH EXAMINATION: Status: ACTIVE | Noted: 2021-06-25

## 2021-06-28 ENCOUNTER — NON-APPOINTMENT (OUTPATIENT)
Age: 61
End: 2021-06-28

## 2021-07-06 ENCOUNTER — APPOINTMENT (OUTPATIENT)
Dept: OTOLARYNGOLOGY | Facility: CLINIC | Age: 61
End: 2021-07-06
Payer: MEDICARE

## 2021-07-06 VITALS — SYSTOLIC BLOOD PRESSURE: 130 MMHG | DIASTOLIC BLOOD PRESSURE: 83 MMHG | HEART RATE: 86 BPM

## 2021-07-06 DIAGNOSIS — C32.9 MALIGNANT NEOPLASM OF LARYNX, UNSPECIFIED: ICD-10-CM

## 2021-07-06 DIAGNOSIS — Z85.038 PERSONAL HISTORY OF OTHER MALIGNANT NEOPLASM OF LARGE INTESTINE: ICD-10-CM

## 2021-07-06 PROCEDURE — 99024 POSTOP FOLLOW-UP VISIT: CPT

## 2021-07-06 NOTE — PHYSICAL EXAM
[Normal] : mucosa is normal [Midline] : trachea located in midline position [FreeTextEntry1] : Aphonic [de-identified] : Wounds well healed.  Stomal sutures removed.

## 2021-07-06 NOTE — HISTORY OF PRESENT ILLNESS
[de-identified] : 60 year old male s/p Tracheostomy, direct laryngoscopy with biopsy, and esophagoscopy 6/4/21.  He underwent total laryngectomy, bilateral MRND (2-4) R thyroid lobectomy and TEP placement with  Reconstruction of pharynx using a left anterolateral thigh free flap 6/11/21.\par \par Pt here for f/u.

## 2021-07-06 NOTE — ASSESSMENT
[FreeTextEntry1] : Path d/w pt (+ for perineural and lymphovascular invasion)\par \par Pt to schedule appt for consideration of RT.

## 2021-07-06 NOTE — DATA REVIEWED
[de-identified] : \par  Pathology             Final\par \par No Documents Attached\par \par \par \par \par   [facility] Accession Number : [id number]\par \par YUNIOR TANG\par \par \par \par Surgical Final Report\par \par \par \par \par Final Diagnosis\par \par 1. Lymph nodes, right levels 2,3,4, neck dissection\par - 55 lymph nodes negative for metastatic carcinoma\par \par 2. Lymph nodes, right paratracheal level 6 nodes, neck dissection\par - 5 lymph nodes negative for metastatic carcinoma\par \par 3. Larynx and hypopharynx, total laryngectomy\par - Squamous cell carcinoma, well to moderately\par differentiated, see synoptic summary\par - Resection margins negative for carcinoma\par - Perineural invasion and lymphovascular invasion seen\par - Thyroid negative for carcinoma\par \par 4. Lymph nodes, left levels 2,3,4, neck dissection\par - 51 lymph nodes negative for metastatic carcinoma\par \par Verified by: NAZANIN TOLENTINO MD\par (Electronic Signature)\par Reported on: 06/17/21 15:27 EDT, [address] [facility] [address],\[place] [address]\par Phone: [phone]   Fax: [phone]\par _________________________________________________________________\par \par Intraoperative Consultation\par 3. Larynx and hypopharynx\par - FSA = benign FSB = benign\par By Dr. ADELITA Leslie\par \par Frozen section Performed at [facility],\par Department of Pathology, [address] [place] [address].\par \par Synoptic Summary\par LARYNX (SUPRAGLOTTIS, GLOTTIS, SUBGLOTTIS): Laryngectomy\par \par Specimen:  Larynx and hypopharynx\par Procedure:  Total laryngectomy\par Specimen Integrity: Intact\par Specimen Size:  7.5 x 6.0 x 5.5 cm\par Additional dimensions:  2.7 x 2.7 x 5.0 cm stoma\par Tumor Laterality:  Bilateral\par Tumor Site:  Glottic, supraglottic, and infraglottic regions\par Tumor Focality:  Unifocal\par Tumor Size:  5.2 x 4.7 x 2.4 cm\par Additional dimensions: None.\par \par Tumor Gross Subtype: Polypoid\par \par \par \par \par \par YUNIOR TANG                         5\par \par \par \par Surgical Final Report\par \par \par \par \par Histologic Type:  Squamous cell carcinoma, conventional\par Histologic Grade:  G1-G2: Well to moderately differentiated\par Margins:  Uninvolved by carcinoma\par Distance of tumor from closest margin (millimeters): 4 mm\par Specify margin, if possible: Anterior soft tissue\par Treatment Effect (applicable to carcinomas treated with\par neoadjuvant therapy):  Not applicable.\par \par Lymph-Vascular Invasion: Identified\par Perineural Invasion: Present around  small and medium size nerve\par bundles\par Lymph Nodes, Extranodal Extension (RIVKA): Not applicable.\par \par Pathologic Staging (pTNM):\par TNM Descriptors:  Not applicable.\par Primary Tumor (pT): pT4a (Invading into cartilage)\par Regional Lymph Nodes:  pN0\par # Lymph nodes examined: 111\par # Lymph nodes involved:  0\par Size of Largest Metastatic Deposit (centimeters): Not\par applicable.\par Lymph Node, Extranodal Extension: Not applicable.\par Distant Metastasis (pM):  pMX\par \par Additional Pathologic Findings:  Not identified.\par \par Clinical History\par SCCA transglottic\par \par Specimen(s) Submitted\par 1-Right neck levels 2,3,4\par 2-Right paratracheal level 6 nodes\par 3-Larynx and hypopharynx\par 4-Left neck contents levels 2,3,4\par \par Gross Description\par 1. The specimen is received in formalin and the specimen\par container is labeled: Right neck levels 2, 3, 4. It consists of\par an unoriented portion of yellow lobulated adipose tissue and\par brown muscle measuring 13.0 x 10.0 x 1.5 cm.  The specimen is\par dissected to reveal 61 lymph nodes ranging from 0.2 x 0.2 x 0.2\par cm to 1.6 x 1.0 x 0.7 cm.  The lymph nodes are entirely\par submitted.  18 cassettes as follows:\par A = eight possible lymph nodes\par B = nine possible  lymph nodes\par C = eight possible lymph nodes\par D = eight possible  lymph nodes\par E = six possible  lymph nodes\par \par \par \par \par \par YUNIOR TANG                         5\par \par \par \par Surgical Final Report\par \par \par \par \par F = six possible  lymph nodes\par G-H = bisected lymph node each\par I = trisected lymph node\par J = trisected lymph node\par K = three differentially inked lymph nodes bisected\par L = three possible lymph nodes\par M = serially sectioned lymph node\par N = serially sectioned lymph node\par O = serially sectioned lymph node\par P = serially sectioned lymph node\par Q = bisected lymph node\par R = serially sectioned lymph node\par \par 2. The specimen is received in formalin and the specimen\par container is labeled: Right paratracheal level 6 nodes.  It\par consists of a 2.9 x 1.9 x 1.0 cm aggregate of yellow lobulated\par adipose tissue and lymph nodes dissected to reveal five white\par firm lymph nodes ranging from 0.3 x 0.3 x 0.2 cm to 1.1 x 0.9 x\par 0.9 cm.  The lymph nodes are entirely submitted.  Three cassettes\par as follows:\par A = three possible lymph nodes\par B = bisected lymph node\par C = trisected lymph node\par \par 3. The specimen is received fresh for intraoperative consultation\par and the specimen container is labeled: Larynx and hypopharynx.\par It consists of a total laryngectomy specimen measuring 7.5 cm\par superior to inferior, 6.0 cm anterior to posterior, 5.5 cm left\par to right, with attached hyoid bone and right thyroid lobe, which\par has been opened in the posterior midline at frozen sectioning.\par On the anterior surface there is a 2.7 x 2.7 cm in diameter x 5.0\par cm in length stoma with multiple disrupted areas.  Continuity of\par the mucosal aspect of the stoma to the epidermal aspect is not\par appreciated with intervening necrotic disrupted areas.  The\par epidermis is grey and ranges in thickness from 0.4 cm to 1.0 cm.\par The mucosal aspect of the stoma is brown-tan dusky. The right\par thyroid lobe measures 4.0 x 2.5 x 1.8 cm. the specimen is inked\par as follows: Posterior black, left anterior blue, right anterior\par green, soft tissue underlying hyoid bone red, tissue directly\par superior to the inferior margin taken at frozen sectioning,\par orange.\par \par An indurated 5.2 x 4.7 x 2.4 cm white-tan polypoid tumor involves\par the right and left glottic, the right supraglottic, and the right\par and left infraglottic regions.  It does involve the right and\par left true vocal cords and the right false cord. The tumor extends\par across the midline anteriorly and posteriorly, however does not\par involve the right or left pyriform fossa margins.  The tumor\par \par \par \par \par \par YUNIOR TANG                         5\par \par \par \par Surgical Final Report\par \par \par \par \par extends to a depth of 0.8 cm, surrounds the thyroid and cricoid\par cartilage with possible thyroid cartilage involvement.  The tumor\par is located 0.4 cm from the right anterior soft tissue margin, 2.0\par cm from the stoma, 1.5 cm from the superior resection margin of\par the laryngo-pharynx, 1.2 cm from the right pyriform fossa margin,\par 2.0 cm from the left pyriform fossa margin, 3.5 cm from the left\par aryepiglottic mucosal margin, 1.0 cm from the right aryepiglottic\par mucosal margin, 1.7 cm from the hyoid bone, 0.7 cm from the\par thyroid lobe, and 2.7 cm from the inferior tracheal margin.\par \par The inferior margin is taken for frozen sectioning and the\par cassette is opened to verify the presence of tissue.  The right\par pyriform sinus margin is taken for frozen sectioning and the\par cassette is opened to verify the presence of tissue.  Photographs\par taken.  Representative sections submitted.  22 cassettes\par following fixation and decalcification as follows:\par FSA = frozen section remnant\par FSB = frozen section right\par A = right pyriform sinus margin\par B = left pyriform sinus margin\par C = tumor to right posterior cricoid cartilage\par D = tumor to left posterior cricoid cartilage\par E = posterior mucosal margin\par F = left aryepiglottic mucosal margin\par G = right aryepiglottic mucosal margin\par H = tumor to thyroid cartilage, thyroid gland, and anterior soft\par tissue margin\par I = tumor to thyroid cartilage and anterior soft tissue margin\par J = uninvolved hyoid bone\par K = representative sections of tissue directly superior to\par tracheal inferior margin\par L = mucosal aspect of stoma\par M = tumor to anterior soft tissue margin and epidermal aspect of\par stoma\par N = tumor to right false and true cords\par O = closest superior soft tissue margin and tissue subjacent to\par hyoid bone\par P = tumor at central false and true cords\par Q = tumor at left false and true cords\par R = full thickness of tumor to include thyroid cartilage\par S-T = continuous sections of tumor to right false and true cords\par \par 4. The specimen is received in formalin and the specimen\par container is labeled: Left neck contents levels 2, 3, and 4. It\par consists of an unoriented portion of yellow lobulated adipose\par tissue and brown muscle measuring 15.0 x 10.5 x 1.2 cm bisected\par to reveal 46 lymph nodes ranging from 0.1 x 0.1 x 0.1 cm to 1.2 x\par \par \par \par \par \par \par YUNIOR TANG                         5\par \par \par \par Surgical Final Report\par \par \par \par \par 0.5 x 0.5 cm. The lymph nodes are entirely submitted. 11\par cassettes as follows:\par A = eight possible lymph nodes\par B = eight possible lymph nodes\par C = eight possible lymph nodes\par D = eight possible  lymph nodes\par E = six possible lymph nodes\par F = six possible lymph nodes\par G = bisected lymph node\par H = two lymph nodes\par I = three differentially inked bisected lymph nodes\par J = three differentially inked bisected lymph nodes\par K = serially sectioned lymph node\par \par In addition to other data that may appear on the specimen\par containers, all labels have been inspected to confirm the\par presence of the patient's name and date of birth.\par \par Tahir RANGELKristian 06/14/2021 17:17\par \par Disclaimer\par Histological Processing Performed at [facility], Department of Pathology, [address] [place] [address].\par \par \par Surgical Consult Report\par \par \par \par \par Disclaimer\par Histological Processing Performed at [facility], Department of Pathology, [address] [place] [address].\par \par  \par \par  Ordered by: REYNOLD HERNANDEZ       Collected/Examined: 11Jun2021 12:36PM       \par Verified by: REYNOLD HERNANDEZ 28Jun2021 11:47AM       \par  Result Communication: No patient communication needed at this time;\par Stage: Final       \par  Performed at: [facility]       Resulted: 17Jun2021 03:27PM       Last Updated: 28Jun2021 11:47AM       Accession: [id number]

## 2021-07-06 NOTE — REASON FOR VISIT
[Initial Evaluation] : an initial evaluation for [FreeTextEntry2] : s/p Tracheostomy, direct laryngoscopy with biopsy, and esophagoscopy 6/4/21, s/p Reconstruction of pharynx using a left anterolateral thigh free flap 6/11/21

## 2021-07-19 ENCOUNTER — APPOINTMENT (OUTPATIENT)
Dept: OTOLARYNGOLOGY | Facility: CLINIC | Age: 61
End: 2021-07-19

## 2021-11-02 NOTE — PROGRESS NOTE ADULT - SUBJECTIVE AND OBJECTIVE BOX
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 Pt initially noticed hoarse voice this past January. Pt presented to the OSH with SOB, difficulty breathing. From pt's records from OSH, CT neck showed bulky transglottic right sided mass, CT chest showed no intrathoracic mass. Pt was given solumedrol and duonebs and pt was transferred for further management. SICU consulted for hyponatremia and CIWA watch, last reported drink 4 days prior to admission. Patient s/p trach 6/4      PAST MEDICAL & SURGICAL HISTORY:  ETOH abuse    ALLERGIES:  Allergy Status Unknown    --------------------------------------------------------------------------------------    MEDICATIONS:    Gastrointestinal Medications  magnesium sulfate  IVPB 2 Gram(s) IV Intermittent once  sodium chloride 0.9%. 1000 milliLiter(s) IV Continuous <Continuous>    Genitourinary Medications    Hematologic/Oncologic Medications  enoxaparin Injectable 40 milliGRAM(s) SubCutaneous daily    --------------------------------------------------------------------------------------    VITAL SIGNS:  ICU Vital Signs Last 24 Hrs  T(C): 36.7 (07 Jun 2021 00:00), Max: 37.7 (06 Jun 2021 16:00)  T(F): 98 (07 Jun 2021 00:00), Max: 99.9 (06 Jun 2021 16:00)  HR: 67 (07 Jun 2021 00:00) (67 - 110)  BP: 131/67 (07 Jun 2021 00:00) (106/77 - 148/91)  BP(mean): 82 (07 Jun 2021 00:00) (76 - 100)  ABP: --  ABP(mean): --  RR: 16 (07 Jun 2021 00:00) (14 - 21)  SpO2: 100% (07 Jun 2021 00:00) (98% - 100%)    --------------------------------------------------------------------------------------    INS AND OUTS:  I&O's Detail    05 Jun 2021 07:01  -  06 Jun 2021 07:00  --------------------------------------------------------  IN:    IV PiggyBack: 100 mL    Oral Fluid: 200 mL    sodium chloride 0.9%: 1800 mL  Total IN: 2100 mL    OUT:    Colostomy (mL): 650 mL    Voided (mL): 1650 mL  Total OUT: 2300 mL    Total NET: -200 mL      06 Jun 2021 07:01  -  07 Jun 2021 01:55  --------------------------------------------------------  IN:    IV PiggyBack: 50 mL    Oral Fluid: 500 mL    sodium chloride 0.9%: 1275 mL  Total IN: 1825 mL    OUT:    Colostomy (mL): 275 mL    Voided (mL): 1100 mL  Total OUT: 1375 mL    Total NET: 450 mL        --------------------------------------------------------------------------------------    EXAM    NEURO: NAD, resting comfortably  HEENT: NC/AT, s/p trach  RESPIRATORY: nonlabored respirations, normal CW expansion  CARDIO: RRR, S1S2  ABDOMEN: soft, nontender, nondistended  EXTREMITIES: normal strength, no deformities    --------------------------------------------------------------------------------------    LABS                        14.6   6.78  )-----------( 98       ( 07 Jun 2021 00:45 )             41.5   06-07    124<L>  |  94<L>  |  8   ----------------------------<  94  3.8   |  21<L>  |  0.65    Ca    8.5      07 Jun 2021 00:45  Phos  2.7     06-07  Mg     1.8     06-07    TPro  6.4  /  Alb  4.0  /  TBili  2.6<H>  /  DBili  x   /  AST  31  /  ALT  41  /  AlkPhos  65  06-05    -------------------------------------------------------------------------------------- You can access the FollowMyHealth Patient Portal offered by [facility] by registering at the following website: [email or web address]. By joining [facility]’s FollowMyHealth portal, you will also be able to view your health information using other applications (apps) compatible with our system.

## 2022-11-09 NOTE — PROGRESS NOTE ADULT - PROBLEM SELECTOR PROBLEM 6
Need for prophylactic measure
Rituxan Pregnancy And Lactation Text: This medication is Pregnancy Category C and it isn't know if it is safe during pregnancy. It is unknown if this medication is excreted in breast milk but similar antibodies are known to be excreted.

## 2023-06-06 NOTE — PROGRESS NOTE ADULT - SUBJECTIVE AND OBJECTIVE BOX
Patient is a 60y old  Male who presents with a chief complaint of right laryngeal mass with SOB (07 Jun 2021 12:57)      SUBJECTIVE / OVERNIGHT EVENTS: No overnight event. OR today for TL.    MEDICATIONS  (STANDING):  ampicillin/sulbactam  IVPB 1.5 Gram(s) IV Intermittent once  enoxaparin Injectable 40 milliGRAM(s) SubCutaneous daily  lactated ringers. 500 milliLiter(s) (75 mL/Hr) IV Continuous <Continuous>  nicotine -   7 mG/24Hr(s) Patch 1 patch Transdermal daily  polyethylene glycol 3350 17 Gram(s) Oral daily  senna 2 Tablet(s) Oral at bedtime    MEDICATIONS  (PRN):  acetaminophen   Tablet .. 650 milliGRAM(s) Oral every 6 hours PRN Mild Pain (1 - 3)  oxyCODONE    IR 5 milliGRAM(s) Oral every 6 hours PRN Moderate Pain (4 - 6)  oxyCODONE    IR 10 milliGRAM(s) Oral every 6 hours PRN Severe Pain (7 - 10)      CAPILLARY BLOOD GLUCOSE        I&O's Summary    10 Isaac 2021 07:01  -  11 Jun 2021 07:00  --------------------------------------------------------  IN: 685 mL / OUT: 2050 mL / NET: -1365 mL        PHYSICAL EXAM:  Vital Signs Last 24 Hrs  T(C): 36.8 (11 Jun 2021 08:09), Max: 36.9 (10 Isaac 2021 14:00)  T(F): 98.3 (11 Jun 2021 08:09), Max: 98.5 (10 Isaac 2021 18:16)  HR: 76 (11 Jun 2021 08:09) (72 - 90)  BP: 140/70 (11 Jun 2021 08:09) (131/86 - 140/70)  BP(mean): --  RR: 16 (11 Jun 2021 08:09) (16 - 19)  SpO2: 99% (11 Jun 2021 08:09) (95% - 99%)  CONSTITUTIONAL: NAD, well-developed, well-groomed  EYES: PERRLA; conjunctiva and sclera clear  ENMT: Moist oral mucosa, no pharyngeal injection or exudates; normal dentition  NECK: trach c/d/i  RESPIRATORY: Normal respiratory effort; lungs are clear to auscultation bilaterally  CARDIOVASCULAR: Regular rate and rhythm, normal S1 and S2, no murmur/rub/gallop; No lower extremity edema; Peripheral pulses are 2+ bilaterally  ABDOMEN: Nontender to palpation, normoactive bowel sounds, no rebound/guarding; No hepatosplenomegaly      LABS:                        15.4   5.25  )-----------( 166      ( 11 Jun 2021 07:22 )             45.5     06-11    128<L>  |  94<L>  |  9   ----------------------------<  83  4.6   |  23  |  0.75    Ca    9.3      11 Jun 2021 07:22  Phos  3.2     06-11  Mg     1.8     06-11    TPro  6.7  /  Alb  3.3  /  TBili  1.6<H>  /  DBili  0.3<H>  /  AST  15  /  ALT  21  /  AlkPhos  65  06-11    PT/INR - ( 11 Jun 2021 07:22 )   PT: 11.3 sec;   INR: 0.98 ratio                     RADIOLOGY & ADDITIONAL TESTS:  Results Reviewed:   Imaging Personally Reviewed:  Electrocardiogram Personally Reviewed:    COORDINATION OF CARE:  Care Discussed with Consultants/Other Providers [Y/N]:  Prior or Outpatient Records Reviewed [Y/N]:   Overweight

## 2023-07-05 NOTE — PATIENT PROFILE ADULT - HEALTH LITERACY
Please wear Ace wrap to the left foot while awake. Rest, ice, elevate. Tylenol and ibuprofen. Podiatry follow-up if needed.
no

## 2023-09-14 NOTE — SPEECH LANGUAGE PATHOLOGY EVALUATION - COMMENTS
No Pt was alert and cooperative for today's consultation which was designed to introduce communicative aids inclusive of communication board, electrolarynx and smart phone device applications ([id number]). Per charting, pt is a "60M with history of HTN, hypothyroidism, tobacco and EtOH dependence, who presents as transfer for work-up right laryngeal mass. Recent history of hoarse voice since 1/2021, now with progressive SOB, found to have right laryngeal mass now s/p biopsy and trach, subsequent TL, bl SND, reconstruction with L ALT and placement of TEP 6/11."    At the time of today's assessment, pt presents as aphonic consistent with recent surgical intervention of total laryngectomy. He is currently making needs/wants known via writing.

## 2024-01-18 NOTE — PROGRESS NOTE ADULT - NSICDXPILOT_GEN_ALL_CORE
[place]
pt c/o left leg pain x a few weeks, states he may have pulled a calf muscle  A&Ox3, resp wnl, ambulatory
[place]
[facility] [place]

## 2024-02-18 NOTE — PROGRESS NOTE ADULT - SUBJECTIVE AND OBJECTIVE BOX
HCC coding opportunities       Chart reviewed, no opportunity found: CHART REVIEWED, NO OPPORTUNITY FOUND        Patients Insurance     Medicare Insurance: [employer] Medicare Advantage           Plastic Surgery Progress Note (pg [facility]: [id number], [place]: [phone])    SUBJECTIVE    The patient was seen and examined. No acute events overnight.    OBJECTIVE  ___________________________________________________  VITAL SIGNS / I&O's       T(C): 36.8 (06-19-21 @ 10:36), Max: 36.9 (06-18-21 @ 17:45)  T(F): 98.3 (06-19-21 @ 10:36), Max: 98.4 (06-18-21 @ 17:45)  HR: 75 (06-19-21 @ 10:36) (72 - 82)  BP: 136/78 (06-19-21 @ 10:36) (126/92 - 137/87)  RR: 18 (06-19-21 @ 10:36) (16 - 18)  SpO2: 100% (06-19-21 @ 10:36) (98% - 100%)      18 Jun 2021 07:01  -  19 Jun 2021 07:00  --------------------------------------------------------  IN:    Enteral Tube Flush: 300 mL    Jevity: 390 mL    Oral Fluid: 480 mL  Total IN: 1170 mL    OUT:    Colostomy (mL): 1225 mL    Voided (mL): 1800 mL  Total OUT: 3025 mL    Total NET: -1855 mL    ___________________________________________________  PHYSICAL EXAM    -- CONSTITUTIONAL: NAD, laying in bed  -- HEENT: NGT in place, trach collar   -- FLAP: good signal with pencil doppler  -- NECK: incision cdi with staples, mild swelling and mild erythema surrounding neck suture line  -- RESPIRATORY: normal WOB  -- EXTREMITIES: L thigh incision cdi    ___________________________________________________  [facility]                                  12.6   5.48   )----------(  286       ( 19 Jun 2021 08:23 )               37.7      133    |  97     |  8      ----------------------------<  88         ( 19 Jun 2021 08:23 )  4.1     |  23     |  0.72     Ca    9.1        ( 19 Jun 2021 08:23 )  Phos  3.7       ( 19 Jun 2021 08:23 )  Mg     1.8       ( 19 Jun 2021 08:23 )          CAPILLARY BLOOD GLUCOSE          ___________________________________________________  MEDICATIONS  (STANDING):  ampicillin/sulbactam  IVPB 1.5 Gram(s) IV Intermittent every 6 hours  aspirin  chewable 81 milliGRAM(s) Oral daily  BACItracin   Ointment 1 Application(s) Topical two times a day  enoxaparin Injectable 40 milliGRAM(s) SubCutaneous daily  multivitamin 1 Tablet(s) Oral daily  polyethylene glycol 3350 17 Gram(s) Oral daily  senna 2 Tablet(s) Oral at bedtime  sodium chloride 0.9% lock flush 3 milliLiter(s) IV Push every 8 hours    MEDICATIONS  (PRN):  acetaminophen    Suspension .. 650 milliGRAM(s) Oral every 6 hours PRN Mild Pain (1 - 3)  oxyCODONE    Solution 5 milliGRAM(s) Oral every 4 hours PRN Moderate and Severe Pain
